# Patient Record
Sex: FEMALE | Race: WHITE
[De-identification: names, ages, dates, MRNs, and addresses within clinical notes are randomized per-mention and may not be internally consistent; named-entity substitution may affect disease eponyms.]

---

## 2017-04-24 ENCOUNTER — HOSPITAL ENCOUNTER (OUTPATIENT)
Dept: HOSPITAL 8 - PETCFH | Age: 57
Discharge: HOME | End: 2017-04-24
Attending: RADIOLOGY
Payer: COMMERCIAL

## 2017-04-24 DIAGNOSIS — C25.1: Primary | ICD-10-CM

## 2017-04-24 PROCEDURE — 78815 PET IMAGE W/CT SKULL-THIGH: CPT

## 2017-04-24 PROCEDURE — A9552 F18 FDG: HCPCS

## 2017-05-02 ENCOUNTER — HOSPITAL ENCOUNTER (OUTPATIENT)
Dept: HOSPITAL 8 - OUT | Age: 57
Discharge: HOME | End: 2017-05-02
Attending: SURGERY
Payer: COMMERCIAL

## 2017-05-02 VITALS — HEIGHT: 65 IN | BODY MASS INDEX: 23.32 KG/M2 | WEIGHT: 139.99 LBS

## 2017-05-02 VITALS — DIASTOLIC BLOOD PRESSURE: 76 MMHG | SYSTOLIC BLOOD PRESSURE: 113 MMHG

## 2017-05-02 DIAGNOSIS — F32.9: ICD-10-CM

## 2017-05-02 DIAGNOSIS — Z84.1: ICD-10-CM

## 2017-05-02 DIAGNOSIS — M19.071: ICD-10-CM

## 2017-05-02 DIAGNOSIS — F41.9: ICD-10-CM

## 2017-05-02 DIAGNOSIS — C25.1: Primary | ICD-10-CM

## 2017-05-02 DIAGNOSIS — Z79.01: ICD-10-CM

## 2017-05-02 PROCEDURE — 85730 THROMBOPLASTIN TIME PARTIAL: CPT

## 2017-05-02 PROCEDURE — 77001 FLUOROGUIDE FOR VEIN DEVICE: CPT

## 2017-05-02 PROCEDURE — 36571 INSERT PICVAD CATH: CPT

## 2017-05-02 PROCEDURE — C1788 PORT, INDWELLING, IMP: HCPCS

## 2017-05-02 PROCEDURE — 85610 PROTHROMBIN TIME: CPT

## 2017-05-02 PROCEDURE — 36415 COLL VENOUS BLD VENIPUNCTURE: CPT

## 2017-05-02 PROCEDURE — 71010: CPT

## 2017-05-24 ENCOUNTER — HOSPITAL ENCOUNTER (EMERGENCY)
Dept: HOSPITAL 8 - ED | Age: 57
Discharge: HOME | End: 2017-05-24
Payer: COMMERCIAL

## 2017-05-24 VITALS — BODY MASS INDEX: 25.37 KG/M2 | HEIGHT: 64 IN | WEIGHT: 148.59 LBS

## 2017-05-24 VITALS — SYSTOLIC BLOOD PRESSURE: 122 MMHG | DIASTOLIC BLOOD PRESSURE: 76 MMHG

## 2017-05-24 DIAGNOSIS — R42: Primary | ICD-10-CM

## 2017-05-24 LAB
AST SERPL-CCNC: 22 U/L (ref 15–37)
BUN SERPL-MCNC: 9 MG/DL (ref 7–18)

## 2017-05-24 PROCEDURE — 36415 COLL VENOUS BLD VENIPUNCTURE: CPT

## 2017-05-24 PROCEDURE — 80053 COMPREHEN METABOLIC PANEL: CPT

## 2017-05-24 PROCEDURE — 93005 ELECTROCARDIOGRAM TRACING: CPT

## 2017-05-24 PROCEDURE — 85025 COMPLETE CBC W/AUTO DIFF WBC: CPT

## 2017-05-24 PROCEDURE — 96360 HYDRATION IV INFUSION INIT: CPT

## 2017-05-24 PROCEDURE — 99285 EMERGENCY DEPT VISIT HI MDM: CPT

## 2017-05-24 PROCEDURE — 83690 ASSAY OF LIPASE: CPT

## 2017-05-24 PROCEDURE — 83605 ASSAY OF LACTIC ACID: CPT

## 2017-05-24 PROCEDURE — 82140 ASSAY OF AMMONIA: CPT

## 2017-05-24 PROCEDURE — 70450 CT HEAD/BRAIN W/O DYE: CPT

## 2017-08-08 DIAGNOSIS — Z01.812 PRE-PROCEDURAL LABORATORY EXAMINATION: ICD-10-CM

## 2017-08-08 LAB
ABO GROUP BLD: NORMAL
ALBUMIN SERPL BCP-MCNC: 4.4 G/DL (ref 3.2–4.9)
ALBUMIN/GLOB SERPL: 1.8 G/DL
ALP SERPL-CCNC: 51 U/L (ref 30–99)
ALT SERPL-CCNC: 20 U/L (ref 2–50)
ANION GAP SERPL CALC-SCNC: 7 MMOL/L (ref 0–11.9)
APTT PPP: 26.8 SEC (ref 24.7–36)
AST SERPL-CCNC: 30 U/L (ref 12–45)
BASOPHILS # BLD AUTO: 0.8 % (ref 0–1.8)
BASOPHILS # BLD: 0.04 K/UL (ref 0–0.12)
BILIRUB SERPL-MCNC: 0.3 MG/DL (ref 0.1–1.5)
BLD GP AB SCN SERPL QL: NORMAL
BUN SERPL-MCNC: 9 MG/DL (ref 8–22)
CALCIUM SERPL-MCNC: 10 MG/DL (ref 8.5–10.5)
CHLORIDE SERPL-SCNC: 103 MMOL/L (ref 96–112)
CO2 SERPL-SCNC: 29 MMOL/L (ref 20–33)
CREAT SERPL-MCNC: 0.86 MG/DL (ref 0.5–1.4)
EOSINOPHIL # BLD AUTO: 0.07 K/UL (ref 0–0.51)
EOSINOPHIL NFR BLD: 1.3 % (ref 0–6.9)
ERYTHROCYTE [DISTWIDTH] IN BLOOD BY AUTOMATED COUNT: 49 FL (ref 35.9–50)
GFR SERPL CREATININE-BSD FRML MDRD: >60 ML/MIN/1.73 M 2
GLOBULIN SER CALC-MCNC: 2.4 G/DL (ref 1.9–3.5)
GLUCOSE SERPL-MCNC: 93 MG/DL (ref 65–99)
HCT VFR BLD AUTO: 42.3 % (ref 37–47)
HGB BLD-MCNC: 13.8 G/DL (ref 12–16)
IMM GRANULOCYTES # BLD AUTO: 0.01 K/UL (ref 0–0.11)
IMM GRANULOCYTES NFR BLD AUTO: 0.2 % (ref 0–0.9)
INR PPP: 0.93 (ref 0.87–1.13)
LYMPHOCYTES # BLD AUTO: 2.1 K/UL (ref 1–4.8)
LYMPHOCYTES NFR BLD: 39.8 % (ref 22–41)
MCH RBC QN AUTO: 29.7 PG (ref 27–33)
MCHC RBC AUTO-ENTMCNC: 32.6 G/DL (ref 33.6–35)
MCV RBC AUTO: 91.2 FL (ref 81.4–97.8)
MONOCYTES # BLD AUTO: 0.42 K/UL (ref 0–0.85)
MONOCYTES NFR BLD AUTO: 8 % (ref 0–13.4)
NEUTROPHILS # BLD AUTO: 2.63 K/UL (ref 2–7.15)
NEUTROPHILS NFR BLD: 49.9 % (ref 44–72)
NRBC # BLD AUTO: 0 K/UL
NRBC BLD AUTO-RTO: 0 /100 WBC
PLATELET # BLD AUTO: 155 K/UL (ref 164–446)
PMV BLD AUTO: 11 FL (ref 9–12.9)
POTASSIUM SERPL-SCNC: 4.5 MMOL/L (ref 3.6–5.5)
PROT SERPL-MCNC: 6.8 G/DL (ref 6–8.2)
PROTHROMBIN TIME: 12.7 SEC (ref 12–14.6)
RBC # BLD AUTO: 4.64 M/UL (ref 4.2–5.4)
RH BLD: NORMAL
SODIUM SERPL-SCNC: 139 MMOL/L (ref 135–145)
WBC # BLD AUTO: 5.3 K/UL (ref 4.8–10.8)

## 2017-08-08 PROCEDURE — 85025 COMPLETE CBC W/AUTO DIFF WBC: CPT

## 2017-08-08 PROCEDURE — 86850 RBC ANTIBODY SCREEN: CPT

## 2017-08-08 PROCEDURE — 85730 THROMBOPLASTIN TIME PARTIAL: CPT

## 2017-08-08 PROCEDURE — 86900 BLOOD TYPING SEROLOGIC ABO: CPT

## 2017-08-08 PROCEDURE — 86901 BLOOD TYPING SEROLOGIC RH(D): CPT

## 2017-08-08 PROCEDURE — 36415 COLL VENOUS BLD VENIPUNCTURE: CPT

## 2017-08-08 PROCEDURE — 85610 PROTHROMBIN TIME: CPT

## 2017-08-08 PROCEDURE — 80053 COMPREHEN METABOLIC PANEL: CPT

## 2017-08-08 RX ORDER — OXYCODONE HYDROCHLORIDE 10 MG/1
10 TABLET ORAL EVERY 4 HOURS PRN
COMMUNITY
End: 2017-08-25

## 2017-08-08 RX ORDER — FLUOXETINE HYDROCHLORIDE 40 MG/1
40 CAPSULE ORAL DAILY
COMMUNITY

## 2017-08-08 RX ORDER — LORAZEPAM 1 MG/1
1 TABLET ORAL 2 TIMES DAILY
COMMUNITY

## 2017-08-09 ENCOUNTER — HOSPITAL ENCOUNTER (INPATIENT)
Facility: MEDICAL CENTER | Age: 57
LOS: 6 days | DRG: 406 | End: 2017-08-15
Attending: SURGERY | Admitting: SURGERY
Payer: COMMERCIAL

## 2017-08-09 ENCOUNTER — RESOLUTE PROFESSIONAL BILLING HOSPITAL PROF FEE (OUTPATIENT)
Dept: HOSPITALIST | Facility: MEDICAL CENTER | Age: 57
End: 2017-08-09
Payer: COMMERCIAL

## 2017-08-09 DIAGNOSIS — C25.1 MALIGNANT NEOPLASM OF BODY OF PANCREAS (HCC): ICD-10-CM

## 2017-08-09 DIAGNOSIS — F41.9 ANXIETY: ICD-10-CM

## 2017-08-09 DIAGNOSIS — G89.18 POSTOPERATIVE PAIN: ICD-10-CM

## 2017-08-09 DIAGNOSIS — R74.01 ELEVATED TRANSAMINASE LEVEL: ICD-10-CM

## 2017-08-09 LAB
ABO GROUP BLD: NORMAL
BASOPHILS # BLD AUTO: 0.1 % (ref 0–1.8)
BASOPHILS # BLD: 0.02 K/UL (ref 0–0.12)
EOSINOPHIL # BLD AUTO: 0 K/UL (ref 0–0.51)
EOSINOPHIL NFR BLD: 0 % (ref 0–6.9)
ERYTHROCYTE [DISTWIDTH] IN BLOOD BY AUTOMATED COUNT: 48.5 FL (ref 35.9–50)
HCT VFR BLD AUTO: 36.3 % (ref 37–47)
HGB BLD-MCNC: 12.2 G/DL (ref 12–16)
IMM GRANULOCYTES # BLD AUTO: 0.05 K/UL (ref 0–0.11)
IMM GRANULOCYTES NFR BLD AUTO: 0.4 % (ref 0–0.9)
LYMPHOCYTES # BLD AUTO: 0.6 K/UL (ref 1–4.8)
LYMPHOCYTES NFR BLD: 4.4 % (ref 22–41)
MCH RBC QN AUTO: 30.7 PG (ref 27–33)
MCHC RBC AUTO-ENTMCNC: 33.6 G/DL (ref 33.6–35)
MCV RBC AUTO: 91.4 FL (ref 81.4–97.8)
MONOCYTES # BLD AUTO: 0.9 K/UL (ref 0–0.85)
MONOCYTES NFR BLD AUTO: 6.6 % (ref 0–13.4)
NEUTROPHILS # BLD AUTO: 11.97 K/UL (ref 2–7.15)
NEUTROPHILS NFR BLD: 88.5 % (ref 44–72)
NRBC # BLD AUTO: 0 K/UL
NRBC BLD AUTO-RTO: 0 /100 WBC
PLATELET # BLD AUTO: 131 K/UL (ref 164–446)
PMV BLD AUTO: 10.7 FL (ref 9–12.9)
RBC # BLD AUTO: 3.97 M/UL (ref 4.2–5.4)
WBC # BLD AUTO: 13.5 K/UL (ref 4.8–10.8)

## 2017-08-09 PROCEDURE — 700101 HCHG RX REV CODE 250

## 2017-08-09 PROCEDURE — 501837 HCHG SUTURE CV: Performed by: SURGERY

## 2017-08-09 PROCEDURE — 501498 HCHG SURG-I-LOOP, MAXI (BLUE): Performed by: SURGERY

## 2017-08-09 PROCEDURE — 501445 HCHG STAPLER, SKIN DISP: Performed by: SURGERY

## 2017-08-09 PROCEDURE — 88305 TISSUE EXAM BY PATHOLOGIST: CPT | Mod: 59

## 2017-08-09 PROCEDURE — 160031 HCHG SURGERY MINUTES - 1ST 30 MINS LEVEL 5: Performed by: SURGERY

## 2017-08-09 PROCEDURE — 88307 TISSUE EXAM BY PATHOLOGIST: CPT

## 2017-08-09 PROCEDURE — 160035 HCHG PACU - 1ST 60 MINS PHASE I: Performed by: SURGERY

## 2017-08-09 PROCEDURE — 500378 HCHG DRAIN, J-VAC ROUND 19FR: Performed by: SURGERY

## 2017-08-09 PROCEDURE — 500380 HCHG DRAIN, PENROSE 1/4X12: Performed by: SURGERY

## 2017-08-09 PROCEDURE — 500122 HCHG BOVIE, BLADE: Performed by: SURGERY

## 2017-08-09 PROCEDURE — 700111 HCHG RX REV CODE 636 W/ 250 OVERRIDE (IP): Performed by: ANESTHESIOLOGY

## 2017-08-09 PROCEDURE — 500424 HCHG DRESSING, AIRSTRIP: Performed by: SURGERY

## 2017-08-09 PROCEDURE — 85025 COMPLETE CBC W/AUTO DIFF WBC: CPT

## 2017-08-09 PROCEDURE — 501422 HCHG SPONGE, SURGIFOAM 100: Performed by: SURGERY

## 2017-08-09 PROCEDURE — 500371 HCHG DRAIN, BLAKE 10MM: Performed by: SURGERY

## 2017-08-09 PROCEDURE — 501838 HCHG SUTURE GENERAL: Performed by: SURGERY

## 2017-08-09 PROCEDURE — 770022 HCHG ROOM/CARE - ICU (200)

## 2017-08-09 PROCEDURE — 500697 HCHG HEMOCLIP, LARGE (ORANGE): Performed by: SURGERY

## 2017-08-09 PROCEDURE — 700105 HCHG RX REV CODE 258: Performed by: ANESTHESIOLOGY

## 2017-08-09 PROCEDURE — 500698 HCHG HEMOCLIP, MEDIUM: Performed by: SURGERY

## 2017-08-09 PROCEDURE — 501561 HCHG TRAY, EPIDURAL SINGLE SHOT: Performed by: SURGERY

## 2017-08-09 PROCEDURE — 160042 HCHG SURGERY MINUTES - EA ADDL 1 MIN LEVEL 5: Performed by: SURGERY

## 2017-08-09 PROCEDURE — 160036 HCHG PACU - EA ADDL 30 MINS PHASE I: Performed by: SURGERY

## 2017-08-09 PROCEDURE — 700102 HCHG RX REV CODE 250 W/ 637 OVERRIDE(OP)

## 2017-08-09 PROCEDURE — 88309 TISSUE EXAM BY PATHOLOGIST: CPT

## 2017-08-09 PROCEDURE — 501443 HCHG STAPLER, ROTIC. FLEX: Performed by: SURGERY

## 2017-08-09 PROCEDURE — C1725 CATH, TRANSLUMIN NON-LASER: HCPCS | Performed by: SURGERY

## 2017-08-09 PROCEDURE — A6402 STERILE GAUZE <= 16 SQ IN: HCPCS | Performed by: SURGERY

## 2017-08-09 PROCEDURE — 502704 HCHG DEVICE, LIGASURE IMPACT: Performed by: SURGERY

## 2017-08-09 PROCEDURE — 160022 HCHG BLOCK: Performed by: SURGERY

## 2017-08-09 PROCEDURE — 160002 HCHG RECOVERY MINUTES (STAT): Performed by: SURGERY

## 2017-08-09 PROCEDURE — A9270 NON-COVERED ITEM OR SERVICE: HCPCS | Performed by: SURGERY

## 2017-08-09 PROCEDURE — 88304 TISSUE EXAM BY PATHOLOGIST: CPT

## 2017-08-09 PROCEDURE — 501463 HCHG STAPLES, UNIV. ROTIC: Performed by: SURGERY

## 2017-08-09 PROCEDURE — 160009 HCHG ANES TIME/MIN: Performed by: SURGERY

## 2017-08-09 PROCEDURE — 700102 HCHG RX REV CODE 250 W/ 637 OVERRIDE(OP): Performed by: SURGERY

## 2017-08-09 PROCEDURE — 500700 HCHG HEMOCLIP, SMALL (RED): Performed by: SURGERY

## 2017-08-09 PROCEDURE — 700111 HCHG RX REV CODE 636 W/ 250 OVERRIDE (IP)

## 2017-08-09 PROCEDURE — 501499 HCHG SURG-I-LOOP, MINI (BLUE): Performed by: SURGERY

## 2017-08-09 PROCEDURE — A9270 NON-COVERED ITEM OR SERVICE: HCPCS

## 2017-08-09 PROCEDURE — 160048 HCHG OR STATISTICAL LEVEL 1-5: Performed by: SURGERY

## 2017-08-09 RX ORDER — DIPHENHYDRAMINE HYDROCHLORIDE 50 MG/ML
25 INJECTION INTRAMUSCULAR; INTRAVENOUS EVERY 6 HOURS PRN
Status: DISCONTINUED | OUTPATIENT
Start: 2017-08-09 | End: 2017-08-12

## 2017-08-09 RX ORDER — DIPHENHYDRAMINE HCL 25 MG
12.5 TABLET ORAL EVERY 6 HOURS PRN
Status: DISCONTINUED | OUTPATIENT
Start: 2017-08-09 | End: 2017-08-09

## 2017-08-09 RX ORDER — LORAZEPAM 2 MG/ML
INJECTION INTRAMUSCULAR
Status: COMPLETED
Start: 2017-08-09 | End: 2017-08-09

## 2017-08-09 RX ORDER — SODIUM CHLORIDE, SODIUM LACTATE, POTASSIUM CHLORIDE, CALCIUM CHLORIDE 600; 310; 30; 20 MG/100ML; MG/100ML; MG/100ML; MG/100ML
INJECTION, SOLUTION INTRAVENOUS CONTINUOUS
Status: DISCONTINUED | OUTPATIENT
Start: 2017-08-09 | End: 2017-08-12

## 2017-08-09 RX ORDER — ONDANSETRON 2 MG/ML
4 INJECTION INTRAMUSCULAR; INTRAVENOUS EVERY 4 HOURS PRN
Status: DISCONTINUED | OUTPATIENT
Start: 2017-08-09 | End: 2017-08-15 | Stop reason: HOSPADM

## 2017-08-09 RX ORDER — LIDOCAINE HYDROCHLORIDE 10 MG/ML
0.5 INJECTION, SOLUTION INFILTRATION; PERINEURAL
Status: COMPLETED | OUTPATIENT
Start: 2017-08-09 | End: 2017-08-09

## 2017-08-09 RX ORDER — ALPRAZOLAM 0.25 MG/1
0.25 TABLET ORAL 2 TIMES DAILY PRN
Status: DISCONTINUED | OUTPATIENT
Start: 2017-08-09 | End: 2017-08-10

## 2017-08-09 RX ORDER — FLUOXETINE HYDROCHLORIDE 20 MG/1
40 CAPSULE ORAL DAILY
Status: DISCONTINUED | OUTPATIENT
Start: 2017-08-09 | End: 2017-08-15 | Stop reason: HOSPADM

## 2017-08-09 RX ORDER — LIDOCAINE AND PRILOCAINE 25; 25 MG/G; MG/G
1 CREAM TOPICAL
Status: COMPLETED | OUTPATIENT
Start: 2017-08-09 | End: 2017-08-09

## 2017-08-09 RX ORDER — SCOLOPAMINE TRANSDERMAL SYSTEM 1 MG/1
1 PATCH, EXTENDED RELEASE TRANSDERMAL
Status: DISCONTINUED | OUTPATIENT
Start: 2017-08-09 | End: 2017-08-10

## 2017-08-09 RX ORDER — NALOXONE HYDROCHLORIDE 0.4 MG/ML
0.1 INJECTION, SOLUTION INTRAMUSCULAR; INTRAVENOUS; SUBCUTANEOUS
Status: DISCONTINUED | OUTPATIENT
Start: 2017-08-09 | End: 2017-08-11

## 2017-08-09 RX ORDER — SODIUM CHLORIDE, SODIUM LACTATE, POTASSIUM CHLORIDE, CALCIUM CHLORIDE 600; 310; 30; 20 MG/100ML; MG/100ML; MG/100ML; MG/100ML
250 INJECTION, SOLUTION INTRAVENOUS PRN
Status: DISCONTINUED | OUTPATIENT
Start: 2017-08-09 | End: 2017-08-11

## 2017-08-09 RX ORDER — SCOLOPAMINE TRANSDERMAL SYSTEM 1 MG/1
1 PATCH, EXTENDED RELEASE TRANSDERMAL
Status: DISCONTINUED | OUTPATIENT
Start: 2017-08-09 | End: 2017-08-12

## 2017-08-09 RX ORDER — DEXAMETHASONE SODIUM PHOSPHATE 4 MG/ML
4 INJECTION, SOLUTION INTRA-ARTICULAR; INTRALESIONAL; INTRAMUSCULAR; INTRAVENOUS; SOFT TISSUE
Status: COMPLETED | OUTPATIENT
Start: 2017-08-09 | End: 2017-08-11

## 2017-08-09 RX ORDER — HALOPERIDOL 5 MG/ML
1 INJECTION INTRAMUSCULAR EVERY 6 HOURS PRN
Status: DISCONTINUED | OUTPATIENT
Start: 2017-08-09 | End: 2017-08-12

## 2017-08-09 RX ORDER — SODIUM CHLORIDE, SODIUM LACTATE, POTASSIUM CHLORIDE, CALCIUM CHLORIDE 600; 310; 30; 20 MG/100ML; MG/100ML; MG/100ML; MG/100ML
INJECTION, SOLUTION INTRAVENOUS CONTINUOUS
Status: DISCONTINUED | OUTPATIENT
Start: 2017-08-09 | End: 2017-08-09

## 2017-08-09 RX ORDER — MAGNESIUM HYDROXIDE 1200 MG/15ML
LIQUID ORAL
Status: DISCONTINUED | OUTPATIENT
Start: 2017-08-09 | End: 2017-08-09 | Stop reason: HOSPADM

## 2017-08-09 RX ORDER — HYDROMORPHONE HYDROCHLORIDE 2 MG/ML
INJECTION, SOLUTION INTRAMUSCULAR; INTRAVENOUS; SUBCUTANEOUS
Status: COMPLETED
Start: 2017-08-09 | End: 2017-08-09

## 2017-08-09 RX ORDER — ONDANSETRON 2 MG/ML
INJECTION INTRAMUSCULAR; INTRAVENOUS
Status: COMPLETED
Start: 2017-08-09 | End: 2017-08-09

## 2017-08-09 RX ORDER — SCOLOPAMINE TRANSDERMAL SYSTEM 1 MG/1
PATCH, EXTENDED RELEASE TRANSDERMAL
Status: COMPLETED
Start: 2017-08-09 | End: 2017-08-12

## 2017-08-09 RX ORDER — LIDOCAINE HYDROCHLORIDE 10 MG/ML
INJECTION, SOLUTION INFILTRATION; PERINEURAL
Status: COMPLETED
Start: 2017-08-09 | End: 2017-08-09

## 2017-08-09 RX ORDER — DIPHENHYDRAMINE HYDROCHLORIDE 50 MG/ML
12.5 INJECTION INTRAMUSCULAR; INTRAVENOUS EVERY 6 HOURS PRN
Status: DISCONTINUED | OUTPATIENT
Start: 2017-08-09 | End: 2017-08-09

## 2017-08-09 RX ADMIN — SODIUM CHLORIDE, SODIUM LACTATE, POTASSIUM CHLORIDE, CALCIUM CHLORIDE: 600; 310; 30; 20 INJECTION, SOLUTION INTRAVENOUS at 10:05

## 2017-08-09 RX ADMIN — HYDROMORPHONE HYDROCHLORIDE 0.5 MG: 1 INJECTION, SOLUTION INTRAMUSCULAR; INTRAVENOUS; SUBCUTANEOUS at 23:52

## 2017-08-09 RX ADMIN — HYDROMORPHONE HYDROCHLORIDE 0.5 MG: 2 INJECTION INTRAMUSCULAR; INTRAVENOUS; SUBCUTANEOUS at 14:55

## 2017-08-09 RX ADMIN — SCOLOPAMINE TRANSDERMAL SYSTEM 1 PATCH: 1 PATCH, EXTENDED RELEASE TRANSDERMAL at 11:20

## 2017-08-09 RX ADMIN — ALPRAZOLAM 0.25 MG: 0.25 TABLET ORAL at 19:43

## 2017-08-09 RX ADMIN — LORAZEPAM 1 MG: 2 INJECTION INTRAMUSCULAR; INTRAVENOUS at 14:50

## 2017-08-09 RX ADMIN — ONDANSETRON 4 MG: 2 INJECTION INTRAMUSCULAR; INTRAVENOUS at 10:10

## 2017-08-09 RX ADMIN — ROPIVACAINE HYDROCHLORIDE: 10 INJECTION, SOLUTION EPIDURAL at 15:07

## 2017-08-09 RX ADMIN — FLUOXETINE HYDROCHLORIDE 40 MG: 20 CAPSULE ORAL at 19:43

## 2017-08-09 RX ADMIN — LIDOCAINE HYDROCHLORIDE 0.5 ML: 10 INJECTION, SOLUTION INFILTRATION; PERINEURAL at 10:05

## 2017-08-09 RX ADMIN — HYDROMORPHONE HYDROCHLORIDE 0.5 MG: 2 INJECTION INTRAMUSCULAR; INTRAVENOUS; SUBCUTANEOUS at 15:05

## 2017-08-09 RX ADMIN — SCOPALAMINE 1 PATCH: 1 PATCH, EXTENDED RELEASE TRANSDERMAL at 11:20

## 2017-08-09 RX ADMIN — LORAZEPAM 1 MG: 2 INJECTION INTRAMUSCULAR; INTRAVENOUS at 14:42

## 2017-08-09 RX ADMIN — HYDROMORPHONE HYDROCHLORIDE 0.5 MG: 1 INJECTION, SOLUTION INTRAMUSCULAR; INTRAVENOUS; SUBCUTANEOUS at 22:26

## 2017-08-09 ASSESSMENT — PAIN SCALES - GENERAL
PAINLEVEL_OUTOF10: 7
PAINLEVEL_OUTOF10: 0
PAINLEVEL_OUTOF10: 8
PAINLEVEL_OUTOF10: 10
PAINLEVEL_OUTOF10: 0
PAINLEVEL_OUTOF10: 2
PAINLEVEL_OUTOF10: 0
PAINLEVEL_OUTOF10: 4
PAINLEVEL_OUTOF10: 0
PAINLEVEL_OUTOF10: 0
PAINLEVEL_OUTOF10: 10
PAINLEVEL_OUTOF10: 0
PAINLEVEL_OUTOF10: 5
PAINLEVEL_OUTOF10: 8
PAINLEVEL_OUTOF10: 4
PAINLEVEL_OUTOF10: 0

## 2017-08-09 ASSESSMENT — LIFESTYLE VARIABLES
EVER_SMOKED: YES
ALCOHOL_USE: NO

## 2017-08-09 NOTE — IP AVS SNAPSHOT
" Home Care Instructions                                                                                                                  Name:Frieda Huizar  Medical Record Number:[id number]  CSN: [id number]    YOB: 1960   Age: 57 y.o.  Sex: female  HT:1.651 m (5' 5\") WT: 52.3 kg (115 lb 4.8 oz)          Admit Date: 8/9/2017     Discharge Date:   Today's Date: 8/15/2017  Attending Doctor:  Gera Lancaster,*                  Allergies:  Review of patient's allergies indicates no known allergies.            Discharge Instructions       Discharge Instructions    Discharged to home by car with relative. Discharged via wheelchair, hospital escort: Yes.  Special equipment needed: Not Applicable    Be sure to schedule a follow-up appointment with your primary care doctor or any specialists as instructed.     Discharge Plan:   Diet Plan: Discussed  Activity Level: Discussed  Confirmed Follow up Appointment: Patient to Call and Schedule Appointment  Confirmed Symptoms Management: Discussed  Medication Reconciliation Updated: Yes  Pneumococcal Vaccine Given - only chart on this line when given: Given (See MAR)  Influenza Vaccine Indication: Not indicated: Previously immunized this influenza season and > 8 years of age    I understand that a diet low in cholesterol, fat, and sodium is recommended for good health. Unless I have been given specific instructions below for another diet, I accept this instruction as my diet prescription.   Other diet: As Tolerated    Special Instructions:   OK to shower  Follow up with office MA on 8/21/17 for staple removal  Follow up with Dr. Lancaster in 2 weeks (8/29/17)    · Is patient discharged on Warfarin / Coumadin?   No     · Is patient Post Blood Transfusion?  No    Depression / Suicide Risk    As you are discharged from this [facility] facility, it is important to learn how to keep safe from harming yourself.    Recognize the warning signs:  · Abrupt changes in " personality, positive or negative- including increase in energy   · Giving away possessions  · Change in eating patterns- significant weight changes-  positive or negative  · Change in sleeping patterns- unable to sleep or sleeping all the time   · Unwillingness or inability to communicate  · Depression  · Unusual sadness, discouragement and loneliness  · Talk of wanting to die  · Neglect of personal appearance   · Rebelliousness- reckless behavior  · Withdrawal from people/activities they love  · Confusion- inability to concentrate     If you or a loved one observes any of these behaviors or has concerns about self-harm, here's what you can do:  · Talk about it- your feelings and reasons for harming yourself  · Remove any means that you might use to hurt yourself (examples: pills, rope, extension cords, firearm)  · Get professional help from the community (Mental Health, Substance Abuse, psychological counseling)  · Do not be alone:Call your Safe Contact- someone whom you trust who will be there for you.  · Call your local CRISIS HOTLINE 897-1248 or 843-003-7612  · Call your local Children's Mobile Crisis Response Team [place] (653) 560-1340 or wwwThotz  · Call the toll free National Suicide Prevention Hotlines   · National Suicide Prevention Lifeline 804-385-JBTF (8023)  · National Hope Line Network 800-SUICIDE (693-7017)    Pancreatic Cancer   Pancreatic cancer is an abnormal growth of tissue (tumor) in the pancreas that is cancerous (malignant). Unlike noncancerous (benign) tumors, malignant tumors can spread to other parts of your body. The pancreas is a gland located deep in the abdomen, between the stomach and the spine. The pancreas makes insulin and other hormones. These hormones help the body use or store the energy that comes from food. The pancreas also makes pancreatic juices. These juices contain enzymes that help digest food.  Most pancreatic cancers begin in the ducts that carry  "pancreatic juices. When cancer of the pancreas spreads (metastasizes) outside the pancreas, cancer cells are often found in nearby lymph nodes. If the cancer has reached these nodes, it means that cancer cells may have spread to other lymph nodes or other tissues, such as the liver or lungs. Sometimes cancer of the pancreas spreads to the peritoneum. This is the tissue that lines the abdomen.  CAUSES   The exact cause of pancreatic cancer is unknown.   RISK FACTORS  There are a number of risk factors that can increase your chances of getting pancreatic cancer. They include:  · Age. The likelihood of developing pancreatic cancer increases with age. Most pancreatic cancers occur in people older than 60 years.  · Smoking. Cigarette smokers are 2 to 3 times more likely than nonsmokers to develop pancreatic cancer.  · Diabetes, especially if you were diagnosed as an adult.  · Being male.  · Being .  · Family history. The risk for developing pancreatic cancer triples if a person's mother, father, sister, or brother had the disease. Also, a family history of colon cancer or ovarian cancer increases the risk of pancreatic cancer.  · Chronic pancreatitis. Chronic pancreatitis is a painful condition of the pancreas.  · Exposure to certain chemicals in the workplace.  · Being obese or eating a diet high in fat and red meat.  SYMPTOMS   Pancreatic cancer is sometimes called a \"silent disease.\" This is because early pancreatic cancer often does not cause symptoms. As the cancer grows, symptoms may include:  · Weakness.  · Abdominal pain.  · Diarrhea.  · Depression.  · Loss of appetite.  · Indigestion.  · Pain in the upper abdomen or upper back.  · Nausea and vomiting.  · Yellowing of the skin or eyes (jaundice).  · Back pain.  · Weight loss.  · Fatigue.  · Black-colored stools.  · Unexplained blood clots.  · Dark urine.  DIAGNOSIS   Your caregiver will ask about your medical history. He or she may also perform a " number of procedures, such as:  · A physical exam. Your skin and eyes will be examined for signs of jaundice. The abdomen will be checked for changes in the area near the pancreas, liver, and gallbladder. Your caregiver will also check for an abnormal buildup of fluid in the abdomen (ascites).  · Lab tests. Your caregiver may take blood and urine samples to check for bilirubin and other substances. Bilirubin is a substance that passes from the liver to the intestine through the gallbladder and bile duct. If the bile duct is blocked by a tumor, the bilirubin cannot pass through normally. Blockage of the bile duct may cause the level of bilirubin in the blood and urine to become very high.  · Computed tomography (CT). An X-ray machine linked to a computer takes a series of detailed pictures of the pancreas and other organs and blood vessels in the abdomen.  · Ultrasonography. The ultrasound device uses sound waves to produce pictures of the pancreas and other organs inside the abdomen.  · Endoscopic retrograde cholangiopancreatography. A lighted tube (endoscope) is passed through the patient's mouth and stomach, down into the small intestine. Then a smaller tube (catheter) is inserted through the endoscope into the bile ducts and pancreatic ducts. A dye is injected through the catheter into the ducts and X-rays are taken. The X-rays can show whether the ducts are narrowed or blocked by a tumor.  · Endoscopic ultrasonography. An endoscope is passed through the patient's mouth and stomach, down into the small intestine. An ultrasound device is placed down the endoscope to produce pictures of the area, including the pancreas.  · Percutaneous transhepatic cholangiography. A dye is injected through a thin needle into the liver and X-rays are taken. Unless there is a blockage, the dye should move freely through the bile ducts. From the X-rays, your caregiver can tell whether there is a blockage from a tumor.  · Taking a  tissue sample (biopsy) from the pancreas. The sample is examined under a microscope to look for cancer cells.  Your cancer will be staged according to its severity and extent. Staging is a careful attempt to categorize your cancer to help determine which treatment will be most appropriate. Factors involved in staging include the size of the tumor, whether the cancer has spread, and if so, to what parts of the body it has spread. You may need to have more tests to determine the stage of your cancer. The test results will help determine what treatment plan is best for you.  STAGES   · Stage I. The cancer is only found in the pancreas.  · Stage II. The cancer has spread to nearby tissues and possibly to the lymph nodes, but not to the blood vessels.  · Stage III. The cancer is surrounding the major blood vessels beside the pancreas and has possibly spread to the lymph nodes.  · Stage IV. The cancer has spread to other parts of the body, such as the liver, lungs, or peritoneum.  TREATMENT   Treatment generally begins within several weeks after the diagnosis. There will be time to talk with your caregiver about treatment choices, get a second opinion, and learn more about the disease. Your caregiver may refer you to a cancer specialist (oncologist).  · Cancer of the pancreas is very hard to control with current treatments. For that reason, many caregivers encourage patients with this disease to consider taking part in a clinical trial. Clinical trials are an important option for people with all stages of pancreatic cancer.  · At this time, pancreatic cancer can be cured only when it is found at an early stage, before it has spread. However, other treatments may be able to control the disease and help patients live longer and feel better. When a cure or control of the disease is not possible, some patients choose palliative therapy. Palliative therapy aims to improve quality of life by controlling pain and other problems  caused by this disease, but it does not cure the disease.  Depending on the type and stage, pancreatic cancer may be treated with surgery, radiation therapy, or chemotherapy. Some patients have a combination of these therapies.  · Surgery may be done to remove all or part of the pancreas. Sometimes the cancer cannot be completely removed. However, if the tumor is blocking the common bile duct or duodenum, the surgeon can place a mesh tube (stent) in the blocked area. This helps keep the duct or duodenum open.  · Radiation therapy uses high-energy rays to kill cancer cells.  · Chemotherapy is the use of drugs to kill cancer cells. Caregivers also give chemotherapy to help reduce pain and other problems caused by pancreatic cancer.  HOME CARE INSTRUCTIONS   · Only take over-the-counter or prescription medicines for pain, discomfort, or fever as directed by your caregiver.  · Maintain a healthy diet.  · Consider joining a support group. This may help you learn to cope with the stress of having pancreatic cancer.  · Seek advice to help you manage treatment side effects.  · Keep all follow-up appointments as directed by your caregiver.  SEEK IMMEDIATE MEDICAL CARE IF:   · You have a sudden increase in pain.  · Your skin or eyes turn more yellow.  · You lose weight without trying.  · You have a fever, especially during chemotherapy treatment or after stent placement.  · You notice new fatigue or weakness.     This information is not intended to replace advice given to you by your health care provider. Make sure you discuss any questions you have with your health care provider.     Whipple Procedure, Care After  Refer to this sheet in the next few weeks. These instructions provide you with information on caring for yourself after your procedure. Your caregiver may also give you more specific instructions. Your treatment has been planned according to current medical practices, but problems sometimes occur. Call your  caregiver if you have any problems or questions after your procedure.  HOME CARE INSTRUCTIONS  Medicines  · Take pain medicine as prescribed by your caregiver. Do not take any over-the-counter pain medicines unless your caregiver says it is okay. Some pain medicines can cause bleeding for several weeks after surgery.  · Constipation is common after this procedure. You may need to take medicine to prevent this.  · Some people have trouble digesting food after a Whipple procedure. Your caregiver may give you medicine to help with digestion.  Wound care  · You may have drainage tubes still in place when you go home. These tubes need to stay in place until no more fluid is draining from your body. Your caregiver will explain what you need to do. Follow the instructions carefully. Note the daily amount of drainage and color of the fluid in the drain. Be sure to ask when you should return to have the tubes taken out.  · You may need to go back to have your stitches (sutures) or staples taken out. Make sure you know when to do that.  · Carefully check your surgical cut (incision) area every day. Make sure there are no signs of infection, such as:  ¨ Pain.  ¨ Swelling.  ¨ Redness.  ¨ Warmth.  ¨ An opening of the incision.  ¨ Bleeding or leaking fluid.  · Keep the incision area dry. Do not use lotions or creams unless your caregiver tells you to.  Diet  · You may not feel like eating for a while. This is normal and may last for a few weeks. When you are able to eat, try to eat:  · Fruits and vegetables.  · Foods with lean protein. Examples are boneless and skinless chicken breasts, lean beef, egg whites, and seafood like tuna and shrimp.  · Low-fat dairy products.  · Foods that are high in fiber. Examples are whole grains, beans, most fruits, and nuts.  · Do not eat foods that contain a lot of fat. They can be hard to digest.  · Drink enough fluids to keep your urine clear or pale yellow. This helps prevent  constipation.  · Try eating small portions more often than 3 times a day. Do not skip meals.  · Weigh yourself once a week. Wear the same amount of clothes each time you weigh yourself.  Activities  · Do not lift anything heavy for 3 to 6 weeks after your surgery. Do not lift anything heavier than 10 pounds (4.5 kg) until your caregiver says it is okay.  · Try to walk 100 yards (90 meters) every day. Do not push yourself too hard. After a few weeks, start to slowly increase how far you walk.  · You can take showers after your bandages are off. Do not take tub baths or swim until your caregiver says it is okay.  · Do not drive if you are taking narcotic pain medicines.  · Ask your caregiver whether it is okay for you do to certain activities, such as going back to work, driving a car, or having sex. It may be a few months before you can go back to all your normal activities.  Follow up  · Keep all your appointments. This is how your caregiver can tell if you are getting better.  · Call your caregiver with any questions.  SEEK MEDICAL CARE IF:  · Your appetite does not get better.  · You have nausea that will not go away.  · You have constipation.  · Your pain does not go away, even after taking pain medicine.  · You become very thirsty, overly tired, dizzy, or you need to urinate often.  SEEK IMMEDIATE MEDICAL CARE IF:  · You cannot eat.  · You are vomiting.  · You have very bad pain that is getting worse.  · You are very tired.  · You have very bad constipation or diarrhea.  · Your skin around the incision or drainage tubes becomes swollen, red, or leaks blood or other fluid.  · Your incision or drainage area hurts.  · You notice a bad smell or a change in the color of fluid in the drainage tube.  · Your incision starts to open.  · You have a fever.  · You have chest pain or difficulty breathing.  MAKE SURE YOU:  · Understand these instructions.  · Will watch your condition.  · Will get help right away if you are  not doing well or get worse.     This information is not intended to replace advice given to you by your health care provider. Make sure you discuss any questions you have with your health care provider.     Open Cholecystectomy, Care After  Refer to this sheet in the next few weeks. These instructions provide you with information on caring for yourself after your procedure. Your health care provider may also give you more specific instructions. Your treatment has been planned according to current medical practices, but problems sometimes occur. Call your health care provider if you have any problems or questions after your procedure.  WHAT TO EXPECT AFTER THE PROCEDURE  After your procedure, it is typical to have the following:  · Pain at your incision site. You will be given medicines to control this pain.  · Constipation. You may be given stool softeners to help prevent this.  HOME CARE INSTRUCTIONS   · Change bandages (dressings) as directed by your health care provider.  · Keep the wound dry and clean. You may wash the wound gently with soap and water. Gently blot or dab the wound dry.  · It is okay to take showers 48 hours after surgery. Do not take baths or use swimming pools or hot tubs for 2 weeks or until your health care provider approves.  · Only take over-the-counter or prescription medicines as directed by your health care provider.  · Continue your normal diet as directed by your health care provider. Eat plenty of fruits and vegetables to help prevent constipation.  · Drink enough fluids to keep your urine clear or pale yellow. This also helps prevent constipation.  · Do not lift anything heavier than 10 pounds (4.5 kg) for 1 month or until your health care provider approves.  · Do not play contact sports for 1 month or until your health care provider approves.  · Do not return to work or school for 4 weeks or until your health care provider approves.  SEEK MEDICAL CARE IF:   · You have redness,  swelling, or increasing pain in the wound.  · You see a yellowish-white fluid (pus) coming from the wound.  · You have drainage from the wound that lasts longer than 1 day.  · You notice a bad smell coming from the wound or dressing.  · Your wound breaks open after the stitches (sutures) or staples have been removed.  · You have increasing pain in the shoulders (shoulder strap areas).  · You have dizzy episodes or faint while standing.  · You feel sick to your stomach (nauseous) or throw up (vomit).  SEEK IMMEDIATE MEDICAL CARE IF:   · You have a fever.  · You develop a rash.  · You have difficulty breathing.  · You have chest pain.  · You have severe abdominal pain.  · You have leg pain.     This information is not intended to replace advice given to you by your health care provider. Make sure you discuss any questions you have with your health care provider.     Oxygen Use at Home  Oxygen can be prescribed for home use. The prescription will show the flow rate. This is how much oxygen is to be used per minute. This will be listed in liters per minute (LPM or L/M). A liter is a metric measurement of volume.  You will use oxygen therapy as directed. It can be used while exercising, sleeping, or at rest. You may need oxygen continuously. Your health care provider may order a blood oxygen test (arterial blood gas or pulse oximetry test) that will show what your oxygen level is. Your health care provider will use these measurements to learn about your needs and follow your progress.  Home oxygen therapy is commonly used on patients with various lung (pulmonary) related conditions. Some of these conditions include:  · Asthma.  · Lung cancer.  · Pneumonia.  · Emphysema.  · Chronic bronchitis.  · Cystic fibrosis.  · Other lung diseases.  · Pulmonary fibrosis.  · Occupational lung disease.  · Heart failure.  · Chronic obstructive pulmonary disease (COPD).  3 COMMON WAYS OF PROVIDING OXYGEN THERAPY  · Gas: The gas form of  oxygen is put into variously sized cylinders or tanks. The cylinders or oxygen tanks contain compressed oxygen. The cylinder is equipped with a regulator that controls the flow rate. Because the flow of oxygen out of the cylinder is constant, an oxygen conserving device may be attached to the system to avoid waste. This device releases the gas only when you inhale and cuts it off when you exhale. Oxygen can be provided in a small cylinder that can be carried with you. Large tanks are heavy and are only for stationary use. After use, empty tanks must be exchanged for full tanks.  · Liquid: The liquid form of oxygen is put into a container similar to a thermos. When released, the liquid converts to a gas and you breathe it in just like the compressed gas. This storage method takes up less space than the compressed gas cylinder, and you can transfer the liquid to a small, portable vessel at home. Liquid oxygen is more expensive than the compressed gas, and the vessel vents when not in use. An oxygen conserving device may be built into the vessel to conserve the oxygen. Liquid oxygen is very cold, around 297° below zero.  · Oxygen concentrator: This medical device filters oxygen from room air and gives almost 100% oxygen to the patient. Oxygen concentrators are powered by electricity. Benefits of this system are:  ¨ It does not need to be resupplied.  ¨ It is not as costly as liquid oxygen.  ¨ Extra tubing permits the user to move around easier.  There are several types of small, portable oxygen systems available which can help you remain active and mobile. You must have a cylinder of oxygen as a backup in the event of a power failure. Advise your electric power company that you are on oxygen therapy in order to get priority service when there is a power failure.  OXYGEN DELIVERY DEVICES  There are 3 common ways to deliver oxygen to your body.  · Nasal cannula. This is a 2-pronged device inserted in the nostrils that is  "connected to tubing carrying the oxygen. The tubing can rest on the ears or be attached to the frame of eyeglasses.  · Mask. People who need a high flow of oxygen generally use a mask.  · Transtracheal catheter. Transtracheal oxygen therapy requires the insertion of a small, flexible tube (catheter) in the windpipe (trachea). This catheter is held in place by a necklace. Since transtracheal oxygen bypasses the mouth, nose, and throat, a humidifier is absolutely required at flow rates of 1 LPM or greater.  OXYGEN USE SAFETY TIPS  · Never smoke while using oxygen. Oxygen does not burn or explode, but flammable materials will burn faster in the presence of oxygen.  · Keep a fire extinguisher close by. Let your fire department know that you have oxygen in your home.  · Warn visitors not to smoke near you when you are using oxygen. Put up \"no smoking\" signs in your home where you most often use the oxygen.  · When you go to a restaurant with your portable oxygen source, ask to be seated in the nonsmoking section.  · Stay at least 5 feet away from gas stoves, candles, lighted fireplaces, or other heat sources.  · Do not use materials that burn easily (flammable) while using your oxygen.  · If you use an oxygen cylinder, make sure it is secured to some fixed object or in a stand. If you use liquid oxygen, make sure the vessel is kept upright to keep the oxygen from pouring out. Liquid oxygen is so cold it can hurt your skin.  · If you use an oxygen concentrator, call your electric company so you will be given priority service if your power goes out. Avoid using extension cords, if possible.  · Regularly test your smoke detectors at home to make sure they work. If you receive care in your home from a nurse or other health care provider, he or she may also check to make sure your smoke detectors work.  GUIDELINES FOR CLEANING YOUR EQUIPMENT  · Wash the nasal prongs with a liquid soap. Thoroughly rinse them once or twice a " week.  · Replace the prongs every 2 to 4 weeks. If you have an infection (cold, pneumonia) change them when you are well.  · Your health care provider will give you instructions on how to clean your transtracheal catheter.  · The humidifier bottle should be washed with soap and warm water and rinsed thoroughly between each refill. Air-dry the bottle before filling it with sterile or distilled water. The bottle and its top should be disinfected after they are cleaned.  · If you use an oxygen concentrator, unplug the unit. Then wipe down the cabinet with a damp cloth and dry it daily. The air filter should be cleaned at least twice a week.  · Follow your home medical equipment and service company's directions for cleaning the compressor filter.  HOME CARE INSTRUCTIONS   · Do not change the flow of oxygen unless directed by your health care provider.  · Do not use alcohol or other sedating drugs unless instructed. They slow your breathing rate.  · Do not use materials that burn easily (flammable) while using your oxygen.  · Always keep a spare tank of oxygen. Plan ahead for holidays when you may not be able to get a prescription filled.  · Use water-based lubricants on your lips or nostrils. Do not use an oil-based product like petroleum jelly.  · To prevent your cheeks or the skin behind your ears from becoming irritated, tuck some gauze under the tubing.  · If you have persistent redness under your nose, call your health care provider.  · When you no longer need oxygen, your doctor will have the oxygen discontinued. Oxygen is not addicting or habit forming.  · Use the oxygen as instructed. Too much oxygen can be harmful and too little will not give you the benefit you need.  · Shortness of breath is not always from a lack of oxygen. If your oxygen level is not the cause of your shortness of breath, taking oxygen will not help.  SEEK MEDICAL CARE IF:   · You have frequent headaches.  · You have shortness of breath or  a lasting cough.  · You have anxiety.  · You are confused.  · You are drowsy or sleepy all the time.  · You develop an illness which aggravates your breathing.  · You cannot exercise.  · You are restless.  · You have blue lips or fingernails.  · You have difficult or irregular breathing and it is getting worse.  · You have a fever.     This information is not intended to replace advice given to you by your health care provider. Make sure you discuss any questions you have with your health care provider.              Follow-up Information     1. Follow up with Alton Layton M.D..    Specialty:  Family Medicine    Why:  As needed    Contact information    [address]  [place] [address]  [phone]          2. Follow up with Gera Lancaster M.D. On 8/29/2017.    Specialties:  Surgery, Radiology    Contact information    [address]  [place] [address]  [phone]           Discharge Medication Instructions:    Below are the medications your physician expects you to take upon discharge:    Review all your home medications and newly ordered medications with your doctor and/or pharmacist. Follow medication instructions as directed by your doctor and/or pharmacist.    Please keep your medication list with you and share with your physician.               Medication List      START taking these medications        Instructions    Morning Afternoon Evening Bedtime    ketorolac 10 MG Tabs   Last time this was given:  10 mg on 8/12/2017  6:30 PM   Commonly known as:  TORADOL   Notes to Patient:  Stagger with Tylenol 650-1,000mg every 6 hours.    Take Toradol 10mg, 3 hours later take Tylenol 650-1,000mg, 3 hours after that repeat the Toradol 10mg.    Continue staggering every 3 hours with the Oxycodone 10mg every 3 hours!        Take 1 Tab by mouth every 6 hours as needed for Mild Pain.   Dose:  10 mg                          CHANGE how you take these medications        Instructions    Morning Afternoon  Evening Bedtime    * oxycodone immediate release 10 MG immediate release tablet   What changed:  Another medication with the same name was added. Make sure you understand how and when to take each.   Last time this was given:  10 mg on 8/15/2017  1:00 PM   Commonly known as:  ROXICODONE        Take 10 mg by mouth every four hours as needed for Moderate Pain.   Dose:  10 mg                        * oxycodone immediate release 10 MG immediate release tablet   What changed:  You were already taking a medication with the same name, and this prescription was added. Make sure you understand how and when to take each.   Last time this was given:  10 mg on 8/15/2017  1:00 PM   Commonly known as:  ROXICODONE        Take 1 Tab by mouth every 3 hours as needed for Severe Pain.   Dose:  10 mg                        * Notice:  This list has 2 medication(s) that are the same as other medications prescribed for you. Read the directions carefully, and ask your doctor or other care provider to review them with you.      CONTINUE taking these medications        Instructions    Morning Afternoon Evening Bedtime    lorazepam 1 MG Tabs   Last time this was given:  1 mg on 8/15/2017  7:36 AM   Commonly known as:  ATIVAN        Take 1 mg by mouth 2 Times a Day.   Dose:  1 mg                        PROZAC 40 MG capsule   Last time this was given:  40 mg on 8/15/2017  7:36 AM   Generic drug:  fluoxetine        Take 40 mg by mouth every day.   Dose:  40 mg                             Where to Get Your Medications      Information about where to get these medications is not yet available     ! Ask your nurse or doctor about these medications    - ketorolac 10 MG Tabs  - oxycodone immediate release 10 MG immediate release tablet            Orders for after discharge     DME O2 New Set Up    Complete by:  As directed        DME O2 New Set Up    Complete by:  As directed              Instructions           Diet / Nutrition:    Follow any diet  instructions given to you by your doctor or the dietician, including how much salt (sodium) you are allowed each day.    If you are overweight, talk to your doctor about a weight reduction plan.    Activity:    Remain physically active following your doctor's instructions about exercise and activity.    Rest often.     Any time you become even a little tired or short of breath, SIT DOWN and rest.    Worsening Symptoms:    Report any of the following signs and symptoms to the doctor's office immediately:    *Pain of jaw, arm, or neck  *Chest pain not relieved by medication                               *Dizziness or loss of consciousness  *Difficulty breathing even when at rest   *More tired than usual                                       *Bleeding drainage or swelling of surgical site  *Swelling of feet, ankles, legs or stomach                 *Fever (>100ºF)  *Pink or blood tinged sputum  *Weight gain (3lbs/day or 5lbs /week)           *Shock from internal defibrillator (if applicable)  *Palpitations or irregular heartbeats                *Cool and/or numb extremities    Stroke Awareness    Common Risk Factors for Stroke include:    Age  Atrial Fibrillation  Carotid Artery Stenosis  Diabetes Mellitus  Excessive alcohol consumption  High blood pressure  Overweight   Physical inactivity  Smoking    Warning signs and symptoms of a stroke include:    *Sudden numbness or weakness of the face, arm or leg (especially on one side of the body).  *Sudden confusion, trouble speaking or understanding.  *Sudden trouble seeing in one or both eyes.  *Sudden trouble walking, dizziness, loss of balance or coordination.Sudden severe headache with no known cause.    It is very important to get treatment quickly when a stroke occurs. If you experience any of the above warning signs, call 911 immediately.                   Disclaimer         Quit Smoking / Tobacco Use:    I understand the use of any tobacco products increases my  chance of suffering from future heart disease or stroke and could cause other illnesses which may shorten my life. Quitting the use of tobacco products is the single most important thing I can do to improve my health. For further information on smoking / tobacco cessation call a Toll Free Quit Line at 1-644.696.9251 (*[employer] [place]) or 1-288.494.6224 (American Lung Association) or you can access the web based program at www.lungusa.org.    [place] Tobacco Users Help Line:  (519) 236-1404       Toll Free: 1-391.555.9480  Quit Tobacco Program [facility] Services (683)685-6496    Crisis Hotline:    [place] Crisis Hotline:  1-258-HLGNSJG or 1-774.915.1333    [place] Crisis Hotline:    1-259.359.2749 or 884-839-5255    Discharge Survey:   Thank you for choosing [facility]. We hope we did everything we could to make your hospital stay a pleasant one. You may be receiving a phone survey and we would appreciate your time and participation in answering the questions. Your input is very valuable to us in our efforts to improve our service to our patients and their families.        My signature on this form indicates that:    1. I have reviewed and understand the above information.  2. My questions regarding this information have been answered to my satisfaction.  3. I have formulated a plan with my discharge nurse to obtain my prescribed medications for home.                  Disclaimer         __________________________________                     __________       ________                       Patient Signature                                                 Date                    Time

## 2017-08-09 NOTE — IP AVS SNAPSHOT
8/15/2017    Frieda Huizar  [address]  [place] [address]    Dear Frieda:    [facility] wants to ensure your discharge home is safe and you or your loved ones have had all of your questions answered regarding your care after you leave the hospital.    Below is a list of resources and contact information should you have any questions regarding your hospital stay, follow-up instructions, or active medical symptoms.    Questions or Concerns Regarding… Contact   Medical Questions Related to Your Discharge  (7 days a week, 8am-5pm) Contact a Nurse Care Coordinator   [phone]   Medical Questions Not Related to Your Discharge  (24 hours a day / 7 days a week)  Contact the Nurse Health Line   [phone]    Medications or Discharge Instructions Refer to your discharge packet   or contact your [facility] Primary Care Provider   [phone]   Follow-up Appointment(s) Schedule your appointment via [employer]   or contact Scheduling [phone]   Billing Review your statement via [employer]  or contact Billing [phone]   Medical Records Review your records via [employer]   or contact Medical Records [phone]     You may receive a telephone call within two days of discharge. This call is to make certain you understand your discharge instructions and have the opportunity to have any questions answered. You can also easily access your medical information, test results and upcoming appointments via the [employer] free online health management tool. You can learn more and sign up at [address]/[employer]. For assistance setting up your [employer] account, please call [phone].    Once again, we want to ensure your discharge home is safe and that you have a clear understanding of any next steps in your care. If you have any questions or concerns, please do not hesitate to contact us, we are here for you. Thank you for choosing [facility] for your healthcare needs.    Sincerely,    Your [facility] Healthcare Team

## 2017-08-09 NOTE — OR NURSING
"Persistent hypotension noted, HR low 100\"s. Skin pink.  No significant changes to dressing and MICHELLE output.  Discussed with Dr. Carrasco - fluid bolus infusing per orders.  Will check CBC.  "

## 2017-08-09 NOTE — OR NURSING
MAP > 60 mmHg s/p crystalloid bolus.  Update given to Dr. Carrasco - no further orders at this time

## 2017-08-09 NOTE — IP AVS SNAPSHOT
" <p align=\"LEFT\"><IMG SRC=\"//[email or web address]/blob$/[email or web address]\" alt=\"Image\" WIDTH=\"50%\" HEIGHT=\"200\" BORDER=\"\"></p>                   Name:Frieda Huizar  Medical Record Number:[id number]  CSN: [id number]    YOB: 1960   Age: 57 y.o.  Sex: female  HT:1.651 m (5' 5\") WT: 52.3 kg (115 lb 4.8 oz)          Admit Date: 8/9/2017     Discharge Date:   Today's Date: 8/15/2017  Attending Doctor:  Gera Lancaster,*                  Allergies:  Review of patient's allergies indicates no known allergies.          Follow-up Information     1. Follow up with Alton Layton M.D..    Specialty:  Family Medicine    Why:  As needed    Contact information    [address]  [place] [id number] [phone]          2. Follow up with Gera Lancaster M.D. On 8/29/2017.    Specialties:  Surgery, Radiology    Contact information    [address]  [place] [address] [phone]           Medication List      Take these Medications        Instructions    ketorolac 10 MG Tabs   Commonly known as:  TORADOL   Notes to Patient:  Stagger with Tylenol 650-1,000mg every 6 hours.    Take Toradol 10mg, 3 hours later take Tylenol 650-1,000mg, 3 hours after that repeat the Toradol 10mg.    Continue staggering every 3 hours with the Oxycodone 10mg every 3 hours!    Take 1 Tab by mouth every 6 hours as needed for Mild Pain.   Dose:  10 mg       lorazepam 1 MG Tabs   Commonly known as:  ATIVAN    Take 1 mg by mouth 2 Times a Day.   Dose:  1 mg       * oxycodone immediate release 10 MG immediate release tablet   What changed:  Another medication with the same name was added. Make sure you understand how and when to take each.   Commonly known as:  ROXICODONE    Take 10 mg by mouth every four hours as needed for Moderate Pain.   Dose:  10 mg       * oxycodone immediate release 10 MG immediate release tablet   What changed:  You were already taking a medication with the same name, and this prescription was added. Make sure you " understand how and when to take each.   Commonly known as:  ROXICODONE    Take 1 Tab by mouth every 3 hours as needed for Severe Pain.   Dose:  10 mg       PROZAC 40 MG capsule   Generic drug:  fluoxetine    Take 40 mg by mouth every day.   Dose:  40 mg       * Notice:  This list has 2 medication(s) that are the same as other medications prescribed for you. Read the directions carefully, and ask your doctor or other care provider to review them with you.

## 2017-08-09 NOTE — OP REPORT
DATE OF SERVICE:  08/09/2017    SURGEON:  Nicolás [place] MD    ASSISTANT:  Gera Lancaster MD    ANESTHESIOLOGIST:  Walter Johnson MD    TYPE OF ANESTHESIA:  General anesthesia.    PREOPERATIVE DIAGNOSIS:  Pancreatic cancer with partial involvement of the   portal vein.    POSTOPERATIVE DIAGNOSIS:  Pancreatic cancer with partial involvement of the   portal vein.    PROCEDURE:  Partial resection of portal vein with direct repair.    INDICATION FOR PROCEDURE:  This is a pleasant lady who is undergoing resection   of pancreatic cancer.  The tumor appeared to be partially involving the   portal vein. The partial portal vein resection is therefore indicated.  The   remaining of the operation will be dictated by Dr. Lancaster.    DESCRIPTION OF PROCEDURE:  The dissection of the pancreatic tumor was   performed down to the portal vein.  A side-biting clamp was used to clamp the   portal vein.  Partial resection of the portal vein was performed along with   the tumor.  The portal vein was then repaired using running 4-0 Prolene   sutures.  There was no significant narrowing of the portal vein following the   Repair.  Excellent hemostasis was seen.    The remaining of the procedure will be dictated separately by Dr. Lancaster.    ESTIMATED BLOOD LOSS:.       ____________________________________     MD QASIM MATAMOROS / ADRIAN    DD:  08/09/2017 14:15:29  DT:  08/09/2017 14:55:08    D#:  [id number]  Job#:  [id number]

## 2017-08-09 NOTE — IP AVS SNAPSHOT
[employer] Access Code: [email or web address]  Expires: 9/14/2017  1:18 PM    Your email address is not on file at [facility].  Email Addresses are required for you to sign up for [employer], please contact [phone] to verify your personal information and to provide your email address prior to attempting to register for [employer].    Frieda Huizar  [address] [place] [address]    [employer]  A secure, online tool to manage your health information     [facility]’s [employer]® is a secure, online tool that connects you to your personalized health information from the privacy of your home -- day or night - making it very easy for you to manage your healthcare. Once the activation process is completed, you can even access your medical information using the [employer] rimma, which is available for free in the Apple Rimma store or Google Play store.     To learn more about [employer], visit [email or web address]    There are two levels of access available (as shown below):   My Chart Features  [facility] Primary Care Doctor [facility]  Specialists [facility]  Urgent  Care Non-[facility] Primary Care Doctor   Email your healthcare team securely and privately 24/7 X X X    Manage appointments: schedule your next appointment; view details of past/upcoming appointments X      Request prescription refills. X      View recent personal medical records, including lab and immunizations X X X X   View health record, including health history, allergies, medications X X X X   Read reports about your outpatient visits, procedures, consult and ER notes X X X X   See your discharge summary, which is a recap of your hospital and/or ER visit that includes your diagnosis, lab results, and care plan X X  X     How to register for [employer]:  Once your e-mail address has been verified, follow the following steps to sign up for [employer].     1. Go to  [email or web address]  2. Click on the Sign Up Now box, which takes you to the New Member Sign Up page. You  will need to provide the following information:  a. Enter your [employer] Access Code exactly as it appears at the top of this page. (You will not need to use this code after you’ve completed the sign-up process. If you do not sign up before the expiration date, you must request a new code.)   b. Enter your date of birth.   c. Enter your home email address.   d. Click Submit, and follow the next screen’s instructions.  3. Create a [employer] ID. This will be your [employer] login ID and cannot be changed, so think of one that is secure and easy to remember.  4. Create a [employer] password. You can change your password at any time.  5. Enter your Password Reset Question and Answer. This can be used at a later time if you forget your password.   6. Enter your e-mail address. This allows you to receive e-mail notifications when new information is available in [employer].  7. Click Sign Up. You can now view your health information.    For assistance activating your [employer] account, call [phone]

## 2017-08-10 PROBLEM — F41.9 ANXIETY: Status: ACTIVE | Noted: 2017-08-10

## 2017-08-10 PROBLEM — R74.01 ELEVATED TRANSAMINASE LEVEL: Status: ACTIVE | Noted: 2017-08-10

## 2017-08-10 PROBLEM — G89.18 POSTOPERATIVE PAIN: Status: ACTIVE | Noted: 2017-08-10

## 2017-08-10 LAB
ALBUMIN SERPL BCP-MCNC: 3.7 G/DL (ref 3.2–4.9)
ALBUMIN/GLOB SERPL: 1.7 G/DL
ALP SERPL-CCNC: 70 U/L (ref 30–99)
ALT SERPL-CCNC: 271 U/L (ref 2–50)
ANION GAP SERPL CALC-SCNC: 8 MMOL/L (ref 0–11.9)
ANISOCYTOSIS BLD QL SMEAR: ABNORMAL
AST SERPL-CCNC: 609 U/L (ref 12–45)
BASOPHILS # BLD AUTO: 0 % (ref 0–1.8)
BASOPHILS # BLD: 0 K/UL (ref 0–0.12)
BILIRUB SERPL-MCNC: 1 MG/DL (ref 0.1–1.5)
BUN SERPL-MCNC: 9 MG/DL (ref 8–22)
CALCIUM SERPL-MCNC: 8.8 MG/DL (ref 8.5–10.5)
CHLORIDE SERPL-SCNC: 101 MMOL/L (ref 96–112)
CO2 SERPL-SCNC: 26 MMOL/L (ref 20–33)
CREAT SERPL-MCNC: 0.55 MG/DL (ref 0.5–1.4)
DACRYOCYTES BLD QL SMEAR: NORMAL
EOSINOPHIL # BLD AUTO: 0 K/UL (ref 0–0.51)
EOSINOPHIL NFR BLD: 0 % (ref 0–6.9)
ERYTHROCYTE [DISTWIDTH] IN BLOOD BY AUTOMATED COUNT: 50.4 FL (ref 35.9–50)
GFR SERPL CREATININE-BSD FRML MDRD: >60 ML/MIN/1.73 M 2
GLOBULIN SER CALC-MCNC: 2.2 G/DL (ref 1.9–3.5)
GLUCOSE SERPL-MCNC: 140 MG/DL (ref 65–99)
HCT VFR BLD AUTO: 38.6 % (ref 37–47)
HGB BLD-MCNC: 12.3 G/DL (ref 12–16)
LYMPHOCYTES # BLD AUTO: 0.55 K/UL (ref 1–4.8)
LYMPHOCYTES NFR BLD: 3.6 % (ref 22–41)
MANUAL DIFF BLD: NORMAL
MCH RBC QN AUTO: 30 PG (ref 27–33)
MCHC RBC AUTO-ENTMCNC: 31.9 G/DL (ref 33.6–35)
MCV RBC AUTO: 94.1 FL (ref 81.4–97.8)
MONOCYTES # BLD AUTO: 1.5 K/UL (ref 0–0.85)
MONOCYTES NFR BLD AUTO: 9.9 % (ref 0–13.4)
MORPHOLOGY BLD-IMP: NORMAL
NEUTROPHILS # BLD AUTO: 13.15 K/UL (ref 2–7.15)
NEUTROPHILS NFR BLD: 82.9 % (ref 44–72)
NEUTS BAND NFR BLD MANUAL: 3.6 % (ref 0–10)
NRBC # BLD AUTO: 0 K/UL
NRBC BLD AUTO-RTO: 0 /100 WBC
OVALOCYTES BLD QL SMEAR: NORMAL
PLATELET # BLD AUTO: 146 K/UL (ref 164–446)
PLATELET BLD QL SMEAR: NORMAL
PMV BLD AUTO: 10.9 FL (ref 9–12.9)
POIKILOCYTOSIS BLD QL SMEAR: NORMAL
POTASSIUM SERPL-SCNC: 4.4 MMOL/L (ref 3.6–5.5)
PROT SERPL-MCNC: 5.9 G/DL (ref 6–8.2)
RBC # BLD AUTO: 4.1 M/UL (ref 4.2–5.4)
RBC BLD AUTO: PRESENT
SMUDGE CELLS BLD QL SMEAR: NORMAL
SODIUM SERPL-SCNC: 135 MMOL/L (ref 135–145)
WBC # BLD AUTO: 15.2 K/UL (ref 4.8–10.8)

## 2017-08-10 PROCEDURE — 700105 HCHG RX REV CODE 258: Performed by: ANESTHESIOLOGY

## 2017-08-10 PROCEDURE — 700111 HCHG RX REV CODE 636 W/ 250 OVERRIDE (IP): Performed by: ANESTHESIOLOGY

## 2017-08-10 PROCEDURE — G8978 MOBILITY CURRENT STATUS: HCPCS | Mod: CK

## 2017-08-10 PROCEDURE — 80053 COMPREHEN METABOLIC PANEL: CPT

## 2017-08-10 PROCEDURE — 0WUF07Z SUPPLEMENT ABDOMINAL WALL WITH AUTOLOGOUS TISSUE SUBSTITUTE, OPEN APPROACH: ICD-10-PCS | Performed by: SURGERY

## 2017-08-10 PROCEDURE — 99233 SBSQ HOSP IP/OBS HIGH 50: CPT | Performed by: SURGERY

## 2017-08-10 PROCEDURE — 770022 HCHG ROOM/CARE - ICU (200)

## 2017-08-10 PROCEDURE — 700111 HCHG RX REV CODE 636 W/ 250 OVERRIDE (IP): Performed by: SURGERY

## 2017-08-10 PROCEDURE — 97166 OT EVAL MOD COMPLEX 45 MIN: CPT

## 2017-08-10 PROCEDURE — 0FBG0ZZ EXCISION OF PANCREAS, OPEN APPROACH: ICD-10-PCS | Performed by: SURGERY

## 2017-08-10 PROCEDURE — 85027 COMPLETE CBC AUTOMATED: CPT

## 2017-08-10 PROCEDURE — A9270 NON-COVERED ITEM OR SERVICE: HCPCS | Performed by: SURGERY

## 2017-08-10 PROCEDURE — 0FT40ZZ RESECTION OF GALLBLADDER, OPEN APPROACH: ICD-10-PCS | Performed by: SURGERY

## 2017-08-10 PROCEDURE — 06B80ZZ EXCISION OF PORTAL VEIN, OPEN APPROACH: ICD-10-PCS | Performed by: INTERNAL MEDICINE

## 2017-08-10 PROCEDURE — 97161 PT EVAL LOW COMPLEX 20 MIN: CPT

## 2017-08-10 PROCEDURE — 85007 BL SMEAR W/DIFF WBC COUNT: CPT

## 2017-08-10 PROCEDURE — 700102 HCHG RX REV CODE 250 W/ 637 OVERRIDE(OP): Performed by: SURGERY

## 2017-08-10 PROCEDURE — G8987 SELF CARE CURRENT STATUS: HCPCS | Mod: CK

## 2017-08-10 PROCEDURE — A9270 NON-COVERED ITEM OR SERVICE: HCPCS | Performed by: ANESTHESIOLOGY

## 2017-08-10 PROCEDURE — 700102 HCHG RX REV CODE 250 W/ 637 OVERRIDE(OP): Performed by: ANESTHESIOLOGY

## 2017-08-10 PROCEDURE — G8979 MOBILITY GOAL STATUS: HCPCS | Mod: CI

## 2017-08-10 PROCEDURE — G8988 SELF CARE GOAL STATUS: HCPCS | Mod: CI

## 2017-08-10 PROCEDURE — 07BP0ZZ EXCISION OF SPLEEN, OPEN APPROACH: ICD-10-PCS | Performed by: SURGERY

## 2017-08-10 PROCEDURE — BF47ZZZ ULTRASONOGRAPHY OF PANCREAS: ICD-10-PCS | Performed by: SURGERY

## 2017-08-10 RX ORDER — MORPHINE SULFATE 15 MG/1
15 TABLET, FILM COATED, EXTENDED RELEASE ORAL EVERY 12 HOURS
Status: DISCONTINUED | OUTPATIENT
Start: 2017-08-10 | End: 2017-08-11

## 2017-08-10 RX ORDER — LORAZEPAM 1 MG/1
1 TABLET ORAL 2 TIMES DAILY
Status: DISCONTINUED | OUTPATIENT
Start: 2017-08-10 | End: 2017-08-15 | Stop reason: HOSPADM

## 2017-08-10 RX ORDER — OXYCODONE HYDROCHLORIDE 10 MG/1
10 TABLET ORAL EVERY 4 HOURS PRN
Status: DISCONTINUED | OUTPATIENT
Start: 2017-08-10 | End: 2017-08-10 | Stop reason: ALTCHOICE

## 2017-08-10 RX ORDER — ALPRAZOLAM 0.5 MG/1
0.5 TABLET ORAL EVERY 4 HOURS PRN
Status: DISCONTINUED | OUTPATIENT
Start: 2017-08-10 | End: 2017-08-10

## 2017-08-10 RX ADMIN — HYDROMORPHONE HYDROCHLORIDE 0.5 MG: 1 INJECTION, SOLUTION INTRAMUSCULAR; INTRAVENOUS; SUBCUTANEOUS at 04:08

## 2017-08-10 RX ADMIN — OXYCODONE HYDROCHLORIDE 10 MG: 10 TABLET ORAL at 06:11

## 2017-08-10 RX ADMIN — FLUOXETINE HYDROCHLORIDE 40 MG: 20 CAPSULE ORAL at 08:11

## 2017-08-10 RX ADMIN — MORPHINE SULFATE 15 MG: 15 TABLET, EXTENDED RELEASE ORAL at 20:04

## 2017-08-10 RX ADMIN — OXYCODONE HYDROCHLORIDE 10 MG: 10 TABLET ORAL at 12:05

## 2017-08-10 RX ADMIN — ENOXAPARIN SODIUM 40 MG: 100 INJECTION SUBCUTANEOUS at 12:05

## 2017-08-10 RX ADMIN — ONDANSETRON 4 MG: 2 INJECTION INTRAMUSCULAR; INTRAVENOUS at 20:04

## 2017-08-10 RX ADMIN — HYDROMORPHONE HYDROCHLORIDE: 2 INJECTION INTRAMUSCULAR; INTRAVENOUS; SUBCUTANEOUS at 21:24

## 2017-08-10 RX ADMIN — HYDROMORPHONE HYDROCHLORIDE 0.5 MG: 1 INJECTION, SOLUTION INTRAMUSCULAR; INTRAVENOUS; SUBCUTANEOUS at 01:06

## 2017-08-10 RX ADMIN — ALPRAZOLAM 0.5 MG: 0.5 TABLET ORAL at 01:06

## 2017-08-10 RX ADMIN — ONDANSETRON 4 MG: 2 INJECTION INTRAMUSCULAR; INTRAVENOUS at 10:13

## 2017-08-10 RX ADMIN — LORAZEPAM 1 MG: 1 TABLET ORAL at 20:04

## 2017-08-10 RX ADMIN — ROPIVACAINE HYDROCHLORIDE: 10 INJECTION, SOLUTION EPIDURAL at 14:03

## 2017-08-10 RX ADMIN — HYDROMORPHONE HYDROCHLORIDE 0.3 MG: 2 INJECTION INTRAMUSCULAR; INTRAVENOUS; SUBCUTANEOUS at 15:10

## 2017-08-10 ASSESSMENT — COGNITIVE AND FUNCTIONAL STATUS - GENERAL
TOILETING: A LOT
EATING MEALS: A LITTLE
PERSONAL GROOMING: A LOT
MOBILITY SCORE: 20
SUGGESTED CMS G CODE MODIFIER MOBILITY: CJ
TURNING FROM BACK TO SIDE WHILE IN FLAT BAD: A LOT
SUGGESTED CMS G CODE MODIFIER DAILY ACTIVITY: CL
HELP NEEDED FOR BATHING: A LOT
DRESSING REGULAR LOWER BODY CLOTHING: A LOT
DAILY ACTIVITIY SCORE: 13
CLIMB 3 TO 5 STEPS WITH RAILING: A LITTLE
MOVING TO AND FROM BED TO CHAIR: A LITTLE
DRESSING REGULAR UPPER BODY CLOTHING: A LOT

## 2017-08-10 ASSESSMENT — PAIN SCALES - GENERAL
PAINLEVEL_OUTOF10: 8
PAINLEVEL_OUTOF10: 10
PAINLEVEL_OUTOF10: 6
PAINLEVEL_OUTOF10: 5
PAINLEVEL_OUTOF10: 4
PAINLEVEL_OUTOF10: 4
PAINLEVEL_OUTOF10: 8
PAINLEVEL_OUTOF10: 5
PAINLEVEL_OUTOF10: 6

## 2017-08-10 ASSESSMENT — ENCOUNTER SYMPTOMS
SHORTNESS OF BREATH: 0
ABDOMINAL PAIN: 1
NEUROLOGICAL NEGATIVE: 1
PALPITATIONS: 0
COUGH: 0
NERVOUS/ANXIOUS: 1
NAUSEA: 1
BACK PAIN: 0
WEAKNESS: 0
MYALGIAS: 1
HEARTBURN: 1
WHEEZING: 0
NECK PAIN: 0

## 2017-08-10 ASSESSMENT — GAIT ASSESSMENTS
GAIT LEVEL OF ASSIST: SUPERVISED
DISTANCE (FEET): 100

## 2017-08-10 ASSESSMENT — ACTIVITIES OF DAILY LIVING (ADL): TOILETING: INDEPENDENT

## 2017-08-10 ASSESSMENT — LIFESTYLE VARIABLES: DO YOU DRINK ALCOHOL: NO

## 2017-08-10 NOTE — CARE PLAN
Problem: Communication  Goal: The ability to communicate needs accurately and effectively will improve  Pt updated on POC, epidural use, and call light for assistance. Pt verbalizes understanding    Problem: Pain Management  Goal: Pain level will decrease to patient’s comfort goal  Assess pts pain level and treat as needed.

## 2017-08-10 NOTE — PROGRESS NOTES
Surgical Progress Note    Author: Gera Lancaster Date & Time created: 8/10/2017   11:57 AM     Interval Events:  POD 1  \difficulty with Pain and anxiety  MICHELLE serous sanguinous  Up in chair     Review of Systems   Gastrointestinal: Positive for abdominal pain.   Psychiatric/Behavioral: The patient is nervous/anxious.    All other systems reviewed and are negative.    Hemodynamics:  Temp (24hrs), Av.6 °C (97.8 °F), Min:36.6 °C (97.8 °F), Max:36.6 °C (97.8 °F)  Temperature: 36.6 °C (97.8 °F), Monitored Temp: 37.7 °C (99.9 °F)  Pulse  Av  Min: 54  Max: 101Heart Rate (Monitored): 83  NIBP: 104/64 mmHg     Respiratory:    Respiration: (!) 35, Pulse Oximetry: 99 %           Neuro:  GCS Total John Coma Score: 15     Fluids:    Intake/Output Summary (Last 24 hours) at 08/10/17 1157  Last data filed at 08/10/17 1000   Gross per 24 hour   Intake   6811 ml   Output   1570 ml   Net   5241 ml     Weight: 51.7 kg (113 lb 15.7 oz)  Current Diet Order   Procedures   • DIET NPO     Physical Exam   Constitutional: She is oriented to person, place, and time. She appears well-developed and well-nourished.   HENT:   Head: Normocephalic and atraumatic.   Eyes: Conjunctivae are normal. Pupils are equal, round, and reactive to light.   Neck: Normal range of motion. Neck supple.   Cardiovascular: Normal rate and regular rhythm.    Pulmonary/Chest: Effort normal and breath sounds normal.   Abdominal: Soft. Bowel sounds are normal.   Musculoskeletal: Normal range of motion.   Neurological: She is alert and oriented to person, place, and time.   Skin: Skin is warm and dry.   Psychiatric: She has a normal mood and affect. Her behavior is normal. Judgment and thought content normal.   Nursing note and vitals reviewed.    Labs:  Recent Results (from the past 24 hour(s))   CBC with Differential    Collection Time: 17  3:47 PM   Result Value Ref Range    WBC 13.5 (H) 4.8 - 10.8 K/uL    RBC 3.97 (L) 4.20 - 5.40 M/uL     Hemoglobin 12.2 12.0 - 16.0 g/dL    Hematocrit 36.3 (L) 37.0 - 47.0 %    MCV 91.4 81.4 - 97.8 fL    MCH 30.7 27.0 - 33.0 pg    MCHC 33.6 33.6 - 35.0 g/dL    RDW 48.5 35.9 - 50.0 fL    Platelet Count 131 (L) 164 - 446 K/uL    MPV 10.7 9.0 - 12.9 fL    Neutrophils-Polys 88.50 (H) 44.00 - 72.00 %    Lymphocytes 4.40 (L) 22.00 - 41.00 %    Monocytes 6.60 0.00 - 13.40 %    Eosinophils 0.00 0.00 - 6.90 %    Basophils 0.10 0.00 - 1.80 %    Immature Granulocytes 0.40 0.00 - 0.90 %    Nucleated RBC 0.00 /100 WBC    Neutrophils (Absolute) 11.97 (H) 2.00 - 7.15 K/uL    Lymphs (Absolute) 0.60 (L) 1.00 - 4.80 K/uL    Monos (Absolute) 0.90 (H) 0.00 - 0.85 K/uL    Eos (Absolute) 0.00 0.00 - 0.51 K/uL    Baso (Absolute) 0.02 0.00 - 0.12 K/uL    Immature Granulocytes (abs) 0.05 0.00 - 0.11 K/uL    NRBC (Absolute) 0.00 K/uL   Comp Metabolic Panel (CMP)    Collection Time: 08/10/17  5:34 AM   Result Value Ref Range    Sodium 135 135 - 145 mmol/L    Potassium 4.4 3.6 - 5.5 mmol/L    Chloride 101 96 - 112 mmol/L    Co2 26 20 - 33 mmol/L    Anion Gap 8.0 0.0 - 11.9    Glucose 140 (H) 65 - 99 mg/dL    Bun 9 8 - 22 mg/dL    Creatinine 0.55 0.50 - 1.40 mg/dL    Calcium 8.8 8.5 - 10.5 mg/dL    AST(SGOT) 609 (H) 12 - 45 U/L    ALT(SGPT) 271 (H) 2 - 50 U/L    Alkaline Phosphatase 70 30 - 99 U/L    Total Bilirubin 1.0 0.1 - 1.5 mg/dL    Albumin 3.7 3.2 - 4.9 g/dL    Total Protein 5.9 (L) 6.0 - 8.2 g/dL    Globulin 2.2 1.9 - 3.5 g/dL    A-G Ratio 1.7 g/dL   CBC with Differential    Collection Time: 08/10/17  5:34 AM   Result Value Ref Range    WBC 15.2 (H) 4.8 - 10.8 K/uL    RBC 4.10 (L) 4.20 - 5.40 M/uL    Hemoglobin 12.3 12.0 - 16.0 g/dL    Hematocrit 38.6 37.0 - 47.0 %    MCV 94.1 81.4 - 97.8 fL    MCH 30.0 27.0 - 33.0 pg    MCHC 31.9 (L) 33.6 - 35.0 g/dL    RDW 50.4 (H) 35.9 - 50.0 fL    Platelet Count 146 (L) 164 - 446 K/uL    MPV 10.9 9.0 - 12.9 fL    Nucleated RBC 0.00 /100 WBC    NRBC (Absolute) 0.00 K/uL    Neutrophils-Polys 82.90 (H)  44.00 - 72.00 %    Lymphocytes 3.60 (L) 22.00 - 41.00 %    Monocytes 9.90 0.00 - 13.40 %    Eosinophils 0.00 0.00 - 6.90 %    Basophils 0.00 0.00 - 1.80 %    Neutrophils (Absolute) 13.15 (H) 2.00 - 7.15 K/uL    Lymphs (Absolute) 0.55 (L) 1.00 - 4.80 K/uL    Monos (Absolute) 1.50 (H) 0.00 - 0.85 K/uL    Eos (Absolute) 0.00 0.00 - 0.51 K/uL    Baso (Absolute) 0.00 0.00 - 0.12 K/uL    Anisocytosis 1+    ESTIMATED GFR    Collection Time: 08/10/17  5:34 AM   Result Value Ref Range    GFR If African American >60 >60 mL/min/1.73 m 2    GFR If Non African American >60 >60 mL/min/1.73 m 2   DIFFERENTIAL MANUAL    Collection Time: 08/10/17  5:34 AM   Result Value Ref Range    Bands-Stabs 3.60 0.00 - 10.00 %    Manual Diff Status PERFORMED    PERIPHERAL SMEAR REVIEW    Collection Time: 08/10/17  5:34 AM   Result Value Ref Range    Peripheral Smear Review see below    PLATELET ESTIMATE    Collection Time: 08/10/17  5:34 AM   Result Value Ref Range    Plt Estimation Decreased    MORPHOLOGY    Collection Time: 08/10/17  5:34 AM   Result Value Ref Range    RBC Morphology Present     Poikilocytosis 1+     Ovalocytes 1+     Tear Drop Cells 1+     Smudge Cells Few      Medical Decision Making, by Problem:  Active Hospital Problems    Diagnosis   • Malignant neoplasm of body of pancreas (CMS-HCC) [C25.1]     Plan:  Agree with CC Team- not ready for floor  Needs pain management tune up  Start clears    Quality Measures:  Medications reviewed and Labs reviewed  Owens catheter: Epidural Catheter / Drain      DVT Prophylaxis: Enoxaparin (Lovenox)  DVT prophylaxis - mechanical: SCDs  Ulcer prophylaxis: Yes    Assessed for rehab: Patient returned to prior level of function, rehabilitation not indicated at this time      Discussed patient condition with Family and RN

## 2017-08-10 NOTE — CARE PLAN
Problem: Communication  Goal: The ability to communicate needs accurately and effectively will improve  Outcome: PROGRESSING AS EXPECTED  Pt alert and oriented, able to communicate needs clearly at this time.    Problem: Safety  Goal: Will remain free from falls  Outcome: PROGRESSING AS EXPECTED  Pt remains free from falls throughout hospitalization - bed in lowest position, bed alarm on, treaded socks and appropriate rails in use, call bell within reach. Pt rounded on hourly to address any needs.

## 2017-08-10 NOTE — OP REPORT
DATE OF SERVICE:  08/09/2017    INDICATIONS:  The patient is a 57-year-old female patient who is known to me   after having been diagnosed with locally advanced pancreatic cancer.  There   appeared to be involvement of the portal vein, SMV junction, and the splenic   vein as well as questionable celiac trunk involvement.  The patient did   undergo neoadjuvant therapy and the patient was adamant about not returning to   neoadjuvant therapy and wanted to attempt an exploratory laparotomy and   possible resection.    SURGEON:  Gera Lancaster MD    ASSISTANT SURGEON:  Nicolás Vila MD    ANESTHESIA:  General plus epidural for postoperative pain management.    ESTIMATED BLOOD LOSS:  250 mL.    COMPLICATIONS:  None.    FINDINGS:  The patient did have an involvement of the portal vein, SMV   junction, which was resected and repaired by Dr. Vila, who will dictate a   separate dictation and I was the assistant on that procedure.  There was   involvement of the adventitia of the celiac trunk at the origin of the splenic   artery, which was resected as well.    PROCEDURES PERFORMED:  1.  Exploratory laparotomy.  2.  Extended distal pancreaticosplenectomy.  3.  Portal vein resection.  4.  Portal vein repair.  5.  Omental flap.    EBL is again as stated above.    SPECIMENS:  Sent off in different sections, celiac trunk nodes, mesenteric   nodes, tail and spleen sent separately, body and neck of the pancreas sent   separately.  Margins were marked.    DESCRIPTION OF THE PROCEDURE:  The patient was brought to OR, placed under   general anesthetic after having an epidural placed for postoperative pain   management.  Both arms are out.  Owens catheter placed, prepped with   chlorhexidine prep, covered with sterile drapes, given preoperative   antibiotics.  Time-out was done, which was adequate.  At that point, an   incision was made from the xiphoid down to the level of the umbilicus.  Upon   entering the abdomen, it  was immediately explored.  The falciform ligament was   resected using LigaSure and sent off to pathology.  Liver appeared clean.  No   sign of metastatic disease.  Peritoneum was clean.  We ran the small bowel   that was negative from Ligament of Treitz all the way down to the ileocecal   valve and the colon appeared clean.  Peritoneum along the abdomen and pelvis   was clean.  Omentum was clean.  At that point, we elected to go ahead and   proceed with entering the lesser sac with the LigaSure, taking all the way up   short gastrics till the left crura and then down through to the level of the   duodenum and pylorus.  Once that was completed, we immediately identified the   tumor.  We did intraoperative ultrasound, identified the margin, which would   be along the neck, but it appeared as though there may be some involvement of   the portal venous confluence at the splenic vein, SMV, portal vein junction.    Splenic vein appeared to be obstructed by the tumor.  So, at that point, we   started incising the inferior margin of the pancreas at the tail and worked   our way medially.  There was significant amount of inflammatory changes and   this body tumor was fairly large and caused significant contraction of vessels   including the middle colic vein, which was ligated in order to access the   underlying portion of the tumor.  We carried the dissection until we   encountered the SMV.  The SMV was identified.  Anterior surface of the SMV was   dissected free and we were able to do that completely all the way through to   this anterior surface.  It appears as though the tumor was invading and   involving the patient's left side of the portal venous SMV junction.  Once we   had completed that, we placed a vessel loop.  We went in cephalad, dissected   free the gastrohepatic ligament using the LigaSure as well as identified the   common hepatic artery.  We traced it back from the GDA because we were able to   identify  DELBERT from dissecting the area of the pancreatic neck proximally until   we encountered what appeared to be the common hepatic artery, celiac trunk   attached to the tumor.  At that point, we felt that based on ultrasound   evaluation and assessment that we could resect the portion of the portal vein   and do a primary repair, but also possibly resect a portion of the celiac   trunk and repair that, so that was completed.  We then did a splenectomy along   with the tail of pancreas, care being taken to avoid entrance into the   pancreatic tumor.  This allowed it to get inspection and approach both portal   vein and celiac trunk from both directions, left and right, without having the   spleen intact and tail.  At that point, we then transected the neck of the   pancreas using an Endo-CARMENZA 45 white load x2.  Once we were able to do that, we   identified the anterior surface of the SMV portal vein junction and continued   our dissection laterally until we encountered the takeoff of the splenic vein   where the tumor was attached.  At that point, we ligated the ileocolic vein   with two 2-0 silk sutures and any other collateral vessels and/or lymphatics   that were going straight for the tumor, involved in the tumor inferiorly.  We   continued the dissection posterior to the tumor.  We then proceeded with   mobilizing the cephalad portion of the tumor away from the celiac trunk, but   we then purposed to resect the portion of the portal vein, SMV junction with   the splenic vein and repair that primarily.  That is when, Dr. Vila came in.    He will dictate that portion of the procedure.  Once that was completed, we   then were able to dissect the common hepatic artery and the celiac trunk off   of the tumor.  It appears as though it had grown or at least attached with   desmoplastic reaction to the adventitia of the vessels, but not direct   invasion.  It did essentially encase the splenic artery coming right off the    celiac trunk, so we were able to dissect down the celiac trunk, clamped it   with a right angle clamp and cut it and marked the margins and sent off to   pathology.  The pathologist was called into the room in order to assess and   explain the margins and why there were 2 separate specimens of pancreas.  At   that point, as of note, we did notice that it appears as though this mass had   grown down into what appeared to be possibly the uncinate of the pancreas and   so, in order to get negative margins, we had to take a portion of the uncinate   of the pancreas, which was then closed with 3-0 PDS closing the capsule   around our resection margin to get a negative margin.  At that point, we   irrigated with copious amounts of fluid.  Dr. Vila had completed the portal   vein resection and repair.  We did ligate the splenic ____ artery at its   origin and everything was dry.  We placed a 19-Bengali round Nemesio from the   left upper quadrant into the area of the retroperitoneum and near the   pancreatic neck.  We also noted that the patient had an inflammatory change   around her gallbladder, so we did do an open cholecystectomy and there was   some thickening of the gallbladder, possibly related to chronic cholecystitis   secondary to chemotherapy, so at that point, we did the dome down technique   and took down the gallbladder all the way to level of the cystic artery.  We   placed 3 clips across both of these because they were extremely small and   transected the specimen and sent it off to pathology separately.  Bovie   electrocautery for hemostasis along the cystic plate.  All needle, sponge   counts and instrument counts were correct.  We placed the stomach back in its   original anatomic position after removing the Omni retractor.  We ran the   small bowel from the ligament of Treitz distally showing no twisting or   volvulus.  We had taken down the splenic flexure of the colon to do the   splenectomy and  placed it back in its left upper quadrant.    We then took the omentum off the transverse mesocolon, transected on the left   side of the colon near the splenic flexure and mobilized it and placed in the   retroperitoneum to lay on top of our drain overlying our transection margin   and our uncinate transection margin point.  The operation was completed.  All   needle, sponge counts, and instrument counts were correct.  The liver appeared   to have no sign of ischemia and there appeared to be no leak from the   pancreatic uncinate and/or neck.  At that point, we then closed the abdomen   with looped PDS from both directions tying in the center.  The drain was   secured with 2-0 nylon, and then, at that point, the skin was closed with   staples throughout.  The patient tolerated the procedure extremely well, was   extubated, and transferred to recovery.       ____________________________________     MD WICHO RAMEY / ADRIAN    DD:  08/09/2017 15:22:36  DT:  08/09/2017 17:20:10    D#:  [id number]  Job#:  [id number]    cc: MARIA DEL CARMEN LEPE MD

## 2017-08-10 NOTE — THERAPY
"Occupational Therapy Evaluation completed.   Functional Status:  Supine to sit with HOB raised with Min A. Sit to stand with CGA. Pt walked unit hallway with FWW and CGA. No signs or symptoms of SOB and appeared stable. Pt fatigued quickly and requested to return to room. Pt washed face seated in chair. Pt educated on importance of OOB activity to increase activity tolerance and use of shower chair during recovery for fall prevention. Pt left seated in chair. Pt indicated pain during tx with intermittent moaning and had urge to vomit throughout tx.   Plan of Care: Will benefit from Occupational Therapy 3 times per week  Discharge Recommendations:  Equipment: Will Continue to Assess for Equipment Needs. Post-acute therapy Discharge to a transitional care facility for continued skilled therapy services.    See \"Rehab Therapy-Acute\" Patient Summary Report for complete documentation.    "

## 2017-08-10 NOTE — CARE PLAN
Problem: Communication  Goal: The ability to communicate needs accurately and effectively will improve  Outcome: PROGRESSING AS EXPECTED  Pt drowsy but arousable and oriented, able to communicate needs clearly at this time.    Problem: Pain Management  Goal: Pain level will decrease to patient’s comfort goal  Outcome: PROGRESSING SLOWER THAN EXPECTED  Pt c/o constant 4/10 abd pain, which becomes intolerable at times at which point pt moans and rolls in bed - epidural in place with gtt infusing. Boluses, PCA doses and xanax administered overnight for relief. Will continue to reassess.

## 2017-08-10 NOTE — THERAPY
"Physical Therapy Evaluation completed.   Bed Mobility:  Supine to Sit: Minimal Assist (With HOB raised)  Transfers: Sit to Stand: Contact Guard Assist  Gait: Level Of Assist: Supervised with No Equipment Needed       Plan of Care: Will benefit from Physical Therapy 3 times per week  Discharge Recommendations: Equipment: Will Continue to Assess for Equipment Needs.  See \"Rehab Therapy-Acute\" Patient Summary Report for complete documentation.     "

## 2017-08-11 LAB
ALBUMIN SERPL BCP-MCNC: 3.1 G/DL (ref 3.2–4.9)
ALBUMIN/GLOB SERPL: 1.4 G/DL
ALP SERPL-CCNC: 64 U/L (ref 30–99)
ALT SERPL-CCNC: 113 U/L (ref 2–50)
AMYLASE FLD-CCNC: 161 U/L
ANION GAP SERPL CALC-SCNC: 8 MMOL/L (ref 0–11.9)
ANISOCYTOSIS BLD QL SMEAR: ABNORMAL
AST SERPL-CCNC: 96 U/L (ref 12–45)
BASOPHILS # BLD AUTO: 0 % (ref 0–1.8)
BASOPHILS # BLD: 0 K/UL (ref 0–0.12)
BILIRUB SERPL-MCNC: 0.7 MG/DL (ref 0.1–1.5)
BODY FLD TYPE: NORMAL
BUN SERPL-MCNC: 7 MG/DL (ref 8–22)
CALCIUM SERPL-MCNC: 8.7 MG/DL (ref 8.5–10.5)
CHLORIDE SERPL-SCNC: 95 MMOL/L (ref 96–112)
CO2 SERPL-SCNC: 27 MMOL/L (ref 20–33)
CREAT SERPL-MCNC: 0.38 MG/DL (ref 0.5–1.4)
EOSINOPHIL # BLD AUTO: 0 K/UL (ref 0–0.51)
EOSINOPHIL NFR BLD: 0 % (ref 0–6.9)
ERYTHROCYTE [DISTWIDTH] IN BLOOD BY AUTOMATED COUNT: 49.5 FL (ref 35.9–50)
GFR SERPL CREATININE-BSD FRML MDRD: >60 ML/MIN/1.73 M 2
GLOBULIN SER CALC-MCNC: 2.2 G/DL (ref 1.9–3.5)
GLUCOSE SERPL-MCNC: 123 MG/DL (ref 65–99)
HCT VFR BLD AUTO: 32.3 % (ref 37–47)
HGB BLD-MCNC: 10.4 G/DL (ref 12–16)
LYMPHOCYTES # BLD AUTO: 2.17 K/UL (ref 1–4.8)
LYMPHOCYTES NFR BLD: 8.8 % (ref 22–41)
MANUAL DIFF BLD: NORMAL
MCH RBC QN AUTO: 29.8 PG (ref 27–33)
MCHC RBC AUTO-ENTMCNC: 32.2 G/DL (ref 33.6–35)
MCV RBC AUTO: 92.6 FL (ref 81.4–97.8)
MICROCYTES BLD QL SMEAR: ABNORMAL
MONOCYTES # BLD AUTO: 1.73 K/UL (ref 0–0.85)
MONOCYTES NFR BLD AUTO: 7 % (ref 0–13.4)
MORPHOLOGY BLD-IMP: NORMAL
MYELOCYTES NFR BLD MANUAL: 0.9 %
NEUTROPHILS # BLD AUTO: 20.58 K/UL (ref 2–7.15)
NEUTROPHILS NFR BLD: 83.3 % (ref 44–72)
NRBC # BLD AUTO: 0 K/UL
NRBC BLD AUTO-RTO: 0 /100 WBC
OVALOCYTES BLD QL SMEAR: NORMAL
PLATELET # BLD AUTO: 155 K/UL (ref 164–446)
PLATELET BLD QL SMEAR: NORMAL
PMV BLD AUTO: 10.7 FL (ref 9–12.9)
POIKILOCYTOSIS BLD QL SMEAR: NORMAL
POTASSIUM SERPL-SCNC: 4.2 MMOL/L (ref 3.6–5.5)
PROT SERPL-MCNC: 5.3 G/DL (ref 6–8.2)
RBC # BLD AUTO: 3.49 M/UL (ref 4.2–5.4)
RBC BLD AUTO: PRESENT
SODIUM SERPL-SCNC: 130 MMOL/L (ref 135–145)
WBC # BLD AUTO: 24.7 K/UL (ref 4.8–10.8)

## 2017-08-11 PROCEDURE — 770001 HCHG ROOM/CARE - MED/SURG/GYN PRIV*

## 2017-08-11 PROCEDURE — A9270 NON-COVERED ITEM OR SERVICE: HCPCS | Performed by: SURGERY

## 2017-08-11 PROCEDURE — 700111 HCHG RX REV CODE 636 W/ 250 OVERRIDE (IP): Performed by: ANESTHESIOLOGY

## 2017-08-11 PROCEDURE — 85027 COMPLETE CBC AUTOMATED: CPT

## 2017-08-11 PROCEDURE — 85007 BL SMEAR W/DIFF WBC COUNT: CPT

## 2017-08-11 PROCEDURE — 700102 HCHG RX REV CODE 250 W/ 637 OVERRIDE(OP): Performed by: SURGERY

## 2017-08-11 PROCEDURE — 700111 HCHG RX REV CODE 636 W/ 250 OVERRIDE (IP): Performed by: SURGERY

## 2017-08-11 PROCEDURE — 99233 SBSQ HOSP IP/OBS HIGH 50: CPT | Performed by: SURGERY

## 2017-08-11 PROCEDURE — 90670 PCV13 VACCINE IM: CPT | Performed by: SURGERY

## 2017-08-11 PROCEDURE — 82150 ASSAY OF AMYLASE: CPT

## 2017-08-11 PROCEDURE — 700105 HCHG RX REV CODE 258: Performed by: SURGERY

## 2017-08-11 PROCEDURE — 80053 COMPREHEN METABOLIC PANEL: CPT

## 2017-08-11 PROCEDURE — 3E0234Z INTRODUCTION OF SERUM, TOXOID AND VACCINE INTO MUSCLE, PERCUTANEOUS APPROACH: ICD-10-PCS | Performed by: SURGERY

## 2017-08-11 PROCEDURE — 90471 IMMUNIZATION ADMIN: CPT

## 2017-08-11 RX ORDER — MORPHINE SULFATE 15 MG/1
15 TABLET, FILM COATED, EXTENDED RELEASE ORAL EVERY 12 HOURS
Status: DISCONTINUED | OUTPATIENT
Start: 2017-08-11 | End: 2017-08-15 | Stop reason: HOSPADM

## 2017-08-11 RX ORDER — KETOROLAC TROMETHAMINE 10 MG/1
10 TABLET, FILM COATED ORAL EVERY 6 HOURS PRN
Status: DISCONTINUED | OUTPATIENT
Start: 2017-08-11 | End: 2017-08-15 | Stop reason: HOSPADM

## 2017-08-11 RX ORDER — POLYETHYLENE GLYCOL 3350 17 G/17G
1 POWDER, FOR SOLUTION ORAL DAILY
Status: DISCONTINUED | OUTPATIENT
Start: 2017-08-11 | End: 2017-08-15 | Stop reason: HOSPADM

## 2017-08-11 RX ORDER — OXYCODONE HYDROCHLORIDE 5 MG/1
5 TABLET ORAL
Status: DISCONTINUED | OUTPATIENT
Start: 2017-08-11 | End: 2017-08-15 | Stop reason: HOSPADM

## 2017-08-11 RX ORDER — OXYCODONE HYDROCHLORIDE 10 MG/1
10 TABLET ORAL
Status: DISCONTINUED | OUTPATIENT
Start: 2017-08-11 | End: 2017-08-15 | Stop reason: HOSPADM

## 2017-08-11 RX ADMIN — ONDANSETRON 4 MG: 2 INJECTION INTRAMUSCULAR; INTRAVENOUS at 08:35

## 2017-08-11 RX ADMIN — SODIUM CHLORIDE, POTASSIUM CHLORIDE, SODIUM LACTATE AND CALCIUM CHLORIDE: 600; 310; 30; 20 INJECTION, SOLUTION INTRAVENOUS at 08:39

## 2017-08-11 RX ADMIN — MORPHINE SULFATE 15 MG: 15 TABLET, EXTENDED RELEASE ORAL at 13:17

## 2017-08-11 RX ADMIN — MORPHINE SULFATE 15 MG: 15 TABLET, EXTENDED RELEASE ORAL at 21:11

## 2017-08-11 RX ADMIN — HYDROMORPHONE HYDROCHLORIDE: 2 INJECTION INTRAMUSCULAR; INTRAVENOUS; SUBCUTANEOUS at 07:14

## 2017-08-11 RX ADMIN — DEXAMETHASONE SODIUM PHOSPHATE 4 MG: 4 INJECTION, SOLUTION INTRAMUSCULAR; INTRAVENOUS at 10:19

## 2017-08-11 RX ADMIN — KETOROLAC TROMETHAMINE 10 MG: 10 TABLET, FILM COATED ORAL at 13:17

## 2017-08-11 RX ADMIN — LORAZEPAM 1 MG: 1 TABLET ORAL at 21:12

## 2017-08-11 RX ADMIN — ONDANSETRON 4 MG: 2 INJECTION INTRAMUSCULAR; INTRAVENOUS at 02:12

## 2017-08-11 RX ADMIN — OXYCODONE HYDROCHLORIDE 10 MG: 10 TABLET ORAL at 17:14

## 2017-08-11 RX ADMIN — ENOXAPARIN SODIUM 40 MG: 100 INJECTION SUBCUTANEOUS at 13:17

## 2017-08-11 RX ADMIN — PNEUMOCOCCAL 13-VALENT CONJUGATE VACCINE 0.5 ML: 2.2; 2.2; 2.2; 2.2; 2.2; 4.4; 2.2; 2.2; 2.2; 2.2; 2.2; 2.2; 2.2 INJECTION, SUSPENSION INTRAMUSCULAR at 15:39

## 2017-08-11 RX ADMIN — SODIUM CHLORIDE, POTASSIUM CHLORIDE, SODIUM LACTATE AND CALCIUM CHLORIDE: 600; 310; 30; 20 INJECTION, SOLUTION INTRAVENOUS at 18:08

## 2017-08-11 RX ADMIN — KETOROLAC TROMETHAMINE 10 MG: 10 TABLET, FILM COATED ORAL at 19:44

## 2017-08-11 RX ADMIN — OXYCODONE HYDROCHLORIDE 10 MG: 10 TABLET ORAL at 23:30

## 2017-08-11 RX ADMIN — ONDANSETRON 4 MG: 2 INJECTION INTRAMUSCULAR; INTRAVENOUS at 20:17

## 2017-08-11 ASSESSMENT — PAIN SCALES - GENERAL
PAINLEVEL_OUTOF10: 2
PAINLEVEL_OUTOF10: 7
PAINLEVEL_OUTOF10: 2
PAINLEVEL_OUTOF10: 6
PAINLEVEL_OUTOF10: 7
PAINLEVEL_OUTOF10: 6
PAINLEVEL_OUTOF10: 6
PAINLEVEL_OUTOF10: 10
PAINLEVEL_OUTOF10: 4
PAINLEVEL_OUTOF10: 9
PAINLEVEL_OUTOF10: 5
PAINLEVEL_OUTOF10: 6
PAINLEVEL_OUTOF10: 5
PAINLEVEL_OUTOF10: 10
PAINLEVEL_OUTOF10: 5
PAINLEVEL_OUTOF10: 4

## 2017-08-11 ASSESSMENT — ENCOUNTER SYMPTOMS
MYALGIAS: 1
WEAKNESS: 0
NAUSEA: 0
NERVOUS/ANXIOUS: 1
NECK PAIN: 0
FEVER: 0
SHORTNESS OF BREATH: 0
BACK PAIN: 0
NEUROLOGICAL NEGATIVE: 1
CHILLS: 0
WHEEZING: 0
ABDOMINAL PAIN: 1
HEARTBURN: 0
PALPITATIONS: 0
COUGH: 0

## 2017-08-11 ASSESSMENT — LIFESTYLE VARIABLES: DO YOU DRINK ALCOHOL: NO

## 2017-08-11 NOTE — CARE PLAN
Problem: Nutritional:  Goal: Achieve adequate nutritional intake  Patient will tolerated PO diet and consume 50% of meals  Outcome: PROGRESSING SLOWER THAN EXPECTED

## 2017-08-11 NOTE — PROGRESS NOTES
"  Trauma/Surgical Progress Note    Author: Fuentes Love Date & Time created: 8/10/2017   7:37 PM     Interval Events:  Extended distal pancreatectomy and splenectomy yesterday  Excessive pain: Improving  Anxiety    Review of Systems   Constitutional: Positive for malaise/fatigue.   Respiratory: Negative for cough, shortness of breath and wheezing.    Cardiovascular: Negative for chest pain, palpitations and leg swelling.   Gastrointestinal: Positive for heartburn, nausea and abdominal pain.   Musculoskeletal: Positive for myalgias. Negative for back pain, joint pain and neck pain.   Neurological: Negative.  Negative for weakness.     Hemodynamics:  Blood pressure 133/76, pulse 72, temperature 36.6 °C (97.8 °F), resp. rate 56, height 1.651 m (5' 5\"), weight 51.7 kg (113 lb 15.7 oz), SpO2 96 %.     Respiratory:    Respiration: (!) 56, Pulse Oximetry: 96 %           Fluids:    Intake/Output Summary (Last 24 hours) at 08/10/17 1937  Last data filed at 08/10/17 1800   Gross per 24 hour   Intake   3620 ml   Output   1360 ml   Net   2260 ml     Admit Weight: 60.1 kg (132 lb 7.9 oz)  Current Weight: 51.7 kg (113 lb 15.7 oz)    Physical Exam   Constitutional: She is oriented to person, place, and time. She is active and cooperative. No distress. Nasal cannula in place.   HENT:   Head: Normocephalic.   Mouth/Throat: Oropharynx is clear and moist.   Eyes: Conjunctivae are normal. No scleral icterus.   Neck: Normal range of motion. No tracheal deviation present.   Pulmonary/Chest: Effort normal. No stridor. No respiratory distress. She has decreased breath sounds in the right lower field and the left lower field. She has no wheezes. She has no rhonchi.   Abdominal: Soft. She exhibits no distension. There is tenderness.   MICHELLE serosanguineous   Musculoskeletal: Normal range of motion. She exhibits no edema or tenderness.   Neurological: She is alert and oriented to person, place, and time.   Skin: Skin is warm and dry. There is " pallor.       Medical Decision Making/Problem List:    Active Hospital Problems    Diagnosis   • Postoperative pain [G89.18]     Priority: Medium     Baseline chronic pain on Norco  Epidural placed at the time of surgery  Patient was significant complaints of ongoing pain despite changes in epidural regimen  Seemed more comfortable in the afternoon  Trend closely     • Anxiety [F41.9]     Priority: Medium     Premorbid  On lorazepam at home: Restarted     • Elevated transaminase level [R74.0]     Priority: Medium     Postop  Trend     • Malignant neoplasm of body of pancreas (CMS-HCC) [C25.1]     Core Measures & Quality Metrics:  Labs reviewed, Medications reviewed and Radiology images reviewed        DVT Prophylaxis: Enoxaparin (Lovenox)  DVT prophylaxis - mechanical: SCDs  Ulcer prophylaxis: Yes        ELLEN Score  Discussed patient condition with RN, RT, Pharmacy, Patient and general surgery.

## 2017-08-11 NOTE — CARE PLAN
Problem: Urinary Elimination:  Goal: Ability to reestablish a normal urinary elimination pattern will improve  Intervention: Evaluate need to continue indwelling urinary catheter  Discussed removal of mcrae at rounds and possibility of transfer therafter. Mcrae top be removed prior to transfer to floor unit; urine output adequate thus far. Pt appropriate and thus able to verbalize when she needs to urinate.       Problem: Skin Integrity  Goal: Risk for impaired skin integrity will decrease  Intervention: Assess risk factors for impaired skin integrity and/or pressure ulcers  Pt turns self from side to side; Q2h reminders in place while pt in bed/ Mobility educated upon and encouraged both for shifting of weight/pressure on bony prominences and pulmonary toileting. Switched pt from regular to silicone nasal cannula with ear protectors. Pillows used for repositioning. Midline abdominal incision and MICHELLE site visualized with MD and throughout shift for breakdown/additional drainage.

## 2017-08-11 NOTE — PROGRESS NOTES
Surgical Progress Note    Author: Gera Lancaster Date & Time created: 2017   9:32 AM     Interval Events:  POD 2  Tolerating po fulls  Epidural removed because not working- feels better but a bit anxious   Review of Systems   Gastrointestinal: Positive for abdominal pain.   Psychiatric/Behavioral: The patient is nervous/anxious.    All other systems reviewed and are negative.    Hemodynamics:  No data recorded.  Monitored Temp: 37.6 °C (99.7 °F)  Pulse  Av.2  Min: 54  Max: 101Heart Rate (Monitored): 70  NIBP: 116/63 mmHg     Respiratory:    Respiration: 16, Pulse Oximetry: 95 %           Neuro:  GCS Total John Coma Score: 15     Fluids:    Intake/Output Summary (Last 24 hours) at 17 0932  Last data filed at 17 0800   Gross per 24 hour   Intake   2970 ml   Output   1175 ml   Net   1795 ml     Weight: 52.3 kg (115 lb 4.8 oz)  Current Diet Order   Procedures   • DIET ORDER     Physical Exam   Constitutional: She is oriented to person, place, and time. She appears well-developed and well-nourished.   HENT:   Head: Normocephalic and atraumatic.   Eyes: Conjunctivae are normal. Pupils are equal, round, and reactive to light.   Neck: Normal range of motion. Neck supple.   Cardiovascular: Normal rate and regular rhythm.    Pulmonary/Chest: Effort normal and breath sounds normal.   Abdominal: Soft. Bowel sounds are normal.   Musculoskeletal: Normal range of motion.   Neurological: She is alert and oriented to person, place, and time.   Skin: Skin is warm and dry.   Psychiatric: She has a normal mood and affect. Her behavior is normal. Judgment and thought content normal.   Nursing note and vitals reviewed.    Labs:  Recent Results (from the past 24 hour(s))   CBC WITH DIFFERENTIAL    Collection Time: 17  9:05 AM   Result Value Ref Range    WBC 24.7 (H) 4.8 - 10.8 K/uL    RBC 3.49 (L) 4.20 - 5.40 M/uL    Hemoglobin 10.4 (L) 12.0 - 16.0 g/dL    Hematocrit 32.3 (L) 37.0 - 47.0 %    MCV  92.6 81.4 - 97.8 fL    MCH 29.8 27.0 - 33.0 pg    MCHC 32.2 (L) 33.6 - 35.0 g/dL    RDW 49.5 35.9 - 50.0 fL    Platelet Count 155 (L) 164 - 446 K/uL    MPV 10.7 9.0 - 12.9 fL    Nucleated RBC 0.00 /100 WBC    NRBC (Absolute) 0.00 K/uL     Medical Decision Making, by Problem:  Active Hospital Problems    Diagnosis   • Postoperative pain [G89.18]     Priority: Medium     Baseline chronic pain on Norco  Epidural placed at the time of surgery  Patient was significant complaints of ongoing pain despite changes in epidural regimen  Seemed more comfortable in the afternoon  Trend closely     • Anxiety [F41.9]     Priority: Medium     Premorbid  On lorazepam at home: Restarted     • Elevated transaminase level [R74.0]     Priority: Medium     Postop  Trend     • Malignant neoplasm of body of pancreas (CMS-HCC) [C25.1]     Plan:  To [facility]  general diet   MICHELLE drain scant    Quality Measures:  Medications reviewed and Labs reviewed  Owens catheter: No Owens  Central line in place: Chemotherapy    DVT Prophylaxis: Enoxaparin (Lovenox)  DVT prophylaxis - mechanical: SCDs  Ulcer prophylaxis: Yes    Assessed for rehab: Patient returned to prior level of function, rehabilitation not indicated at this time      Discussed patient condition with Family and RN

## 2017-08-11 NOTE — CARE PLAN
Problem: Knowledge Deficit  Goal: Knowledge of the prescribed therapeutic regimen will improve  Pt educated on PCA and POC    Problem: Pain Management  Goal: Pain level will decrease to patient’s comfort goal  Assess pts pain level and treat as needed

## 2017-08-11 NOTE — PROGRESS NOTES
"  Trauma/Surgical Progress Note    Author: Fuentes Love Date & Time created: 8/11/2017   2:38 PM     Interval Events:  Epidural removed  Owens removed  Oral pain medication started  Advancing diet    Review of Systems   Constitutional: Negative for fever, chills and malaise/fatigue.   Respiratory: Negative for cough, shortness of breath and wheezing.    Cardiovascular: Negative for chest pain, palpitations and leg swelling.   Gastrointestinal: Positive for abdominal pain. Negative for heartburn and nausea.   Musculoskeletal: Positive for myalgias. Negative for back pain, joint pain and neck pain.   Neurological: Negative.  Negative for weakness.     Hemodynamics:  Blood pressure 133/76, pulse 63, temperature 36.6 °C (97.8 °F), resp. rate 16, height 1.651 m (5' 5\"), weight 52.3 kg (115 lb 4.8 oz), SpO2 86 %.     Respiratory:    Respiration: 16, Pulse Oximetry: (!) 86 %           Fluids:    Intake/Output Summary (Last 24 hours) at 08/11/17 1438  Last data filed at 08/11/17 1200   Gross per 24 hour   Intake   2770 ml   Output   1355 ml   Net   1415 ml     Admit Weight: 60.1 kg (132 lb 7.9 oz)  Current Weight: 52.3 kg (115 lb 4.8 oz)    Physical Exam   Constitutional: She is oriented to person, place, and time. She is active and cooperative. No distress. Nasal cannula in place.   HENT:   Head: Normocephalic.   Mouth/Throat: Oropharynx is clear and moist.   Eyes: Conjunctivae are normal. No scleral icterus.   Neck: Normal range of motion. No tracheal deviation present.   Pulmonary/Chest: Effort normal. No stridor. No respiratory distress. She has decreased breath sounds in the right lower field and the left lower field. She has no wheezes. She has no rhonchi.   Abdominal: Soft. She exhibits no distension. There is tenderness.   MICHELLE 90 mL serous  Incision CDI   Musculoskeletal: Normal range of motion. She exhibits no edema or tenderness.   Neurological: She is alert and oriented to person, place, and time.   Skin: Skin " is warm and dry. There is pallor.       Medical Decision Making/Problem List:    Active Hospital Problems    Diagnosis   • Postoperative pain [G89.18]     Priority: Medium     Baseline chronic pain on Norco  Epidural placed at the time of surgery  Patient was significant complaints of ongoing pain despite changes in epidural regimen  8/11 epidural not working well, so was removed  Restart Lovenox in the morning  PRN IV and oral medications with Toradol: Reassess     • Anxiety [F41.9]     Priority: Medium     Premorbid  On lorazepam at home: Restarted  8/11 still anxious: Increase frequency of prn medication     • Elevated transaminase level [R74.0]     Priority: Medium     Postop elevation of transaminases  8/11 normalizing  Trend     • Malignant neoplasm of body of pancreas (CMS-HCC) [C25.1]     Invasive neoplasm at the neck of the pancreas  8/9 Extended distal pancreatectomy with splenectomy and resection of IMV  Surgical oncology: [facility]       Patient medically stable for transfer to surgical floor    Core Measures & Quality Metrics:  Labs reviewed, Medications reviewed and Radiology images reviewed        DVT Prophylaxis: Enoxaparin (Lovenox)  DVT prophylaxis - mechanical: SCDs  Ulcer prophylaxis: Yes    Assessed for rehab: Patient was assess for and/or received rehabilitation services during this hospitalization    ELLEN Score  Discussed patient condition with RN, RT, Pharmacy, Patient and general surgery.

## 2017-08-11 NOTE — DIETARY
"Nutrition Services: Patient seen for poor PO and wt loss prior to admit    57 y.o. female with admitting DX of PANCREATIC CA s/p Partial resection of portal vein with direct repair, exploratory laparotomy, pancreaticosplenectomy, omental flap(8/9)    Height: 165.1 cm (5' 5\")  Weight: 52.3 kg (115 lb 4.8 oz)  Body mass index is 19.19 kg/(m^2).    Patient stated she lost approximately 20# in the past six months, but weight loss has stopped and she has started to gain weight.  Very low appetite currently but wanted to try something besides clear liquids.    Patient was on a clear liquid diet, just advanced.  Labs, medications and past medical history reviewed.    Recommend:  Encourage PO as tolerated.  Nutrition Representative will continue to see her for ongoing meal and snack preferences.  RD following.      "

## 2017-08-12 LAB
ALBUMIN SERPL BCP-MCNC: 2.8 G/DL (ref 3.2–4.9)
ALBUMIN/GLOB SERPL: 1.2 G/DL
ALP SERPL-CCNC: 75 U/L (ref 30–99)
ALT SERPL-CCNC: 68 U/L (ref 2–50)
ANION GAP SERPL CALC-SCNC: 8 MMOL/L (ref 0–11.9)
AST SERPL-CCNC: 47 U/L (ref 12–45)
BILIRUB SERPL-MCNC: 0.6 MG/DL (ref 0.1–1.5)
BUN SERPL-MCNC: 7 MG/DL (ref 8–22)
CALCIUM SERPL-MCNC: 8.3 MG/DL (ref 8.5–10.5)
CHLORIDE SERPL-SCNC: 99 MMOL/L (ref 96–112)
CO2 SERPL-SCNC: 28 MMOL/L (ref 20–33)
CREAT SERPL-MCNC: 0.41 MG/DL (ref 0.5–1.4)
ERYTHROCYTE [DISTWIDTH] IN BLOOD BY AUTOMATED COUNT: 48.7 FL (ref 35.9–50)
GFR SERPL CREATININE-BSD FRML MDRD: >60 ML/MIN/1.73 M 2
GLOBULIN SER CALC-MCNC: 2.3 G/DL (ref 1.9–3.5)
GLUCOSE SERPL-MCNC: 107 MG/DL (ref 65–99)
HCT VFR BLD AUTO: 30.6 % (ref 37–47)
HGB BLD-MCNC: 10 G/DL (ref 12–16)
MCH RBC QN AUTO: 29.9 PG (ref 27–33)
MCHC RBC AUTO-ENTMCNC: 32.7 G/DL (ref 33.6–35)
MCV RBC AUTO: 91.3 FL (ref 81.4–97.8)
PLATELET # BLD AUTO: 235 K/UL (ref 164–446)
PMV BLD AUTO: 11.7 FL (ref 9–12.9)
POTASSIUM SERPL-SCNC: 3.4 MMOL/L (ref 3.6–5.5)
PROT SERPL-MCNC: 5.1 G/DL (ref 6–8.2)
RBC # BLD AUTO: 3.35 M/UL (ref 4.2–5.4)
SODIUM SERPL-SCNC: 135 MMOL/L (ref 135–145)
WBC # BLD AUTO: 24.2 K/UL (ref 4.8–10.8)

## 2017-08-12 PROCEDURE — 770006 HCHG ROOM/CARE - MED/SURG/GYN SEMI*

## 2017-08-12 PROCEDURE — 80053 COMPREHEN METABOLIC PANEL: CPT

## 2017-08-12 PROCEDURE — A9270 NON-COVERED ITEM OR SERVICE: HCPCS | Performed by: SURGERY

## 2017-08-12 PROCEDURE — 700102 HCHG RX REV CODE 250 W/ 637 OVERRIDE(OP): Performed by: SURGERY

## 2017-08-12 PROCEDURE — 36415 COLL VENOUS BLD VENIPUNCTURE: CPT

## 2017-08-12 PROCEDURE — 700111 HCHG RX REV CODE 636 W/ 250 OVERRIDE (IP): Performed by: SURGERY

## 2017-08-12 PROCEDURE — 700105 HCHG RX REV CODE 258: Performed by: SURGERY

## 2017-08-12 PROCEDURE — 85027 COMPLETE CBC AUTOMATED: CPT

## 2017-08-12 RX ORDER — HALOPERIDOL 5 MG/ML
2-5 INJECTION INTRAMUSCULAR EVERY 6 HOURS PRN
Status: DISCONTINUED | OUTPATIENT
Start: 2017-08-12 | End: 2017-08-15 | Stop reason: HOSPADM

## 2017-08-12 RX ORDER — ZOLPIDEM TARTRATE 5 MG/1
5 TABLET ORAL NIGHTLY PRN
Status: DISCONTINUED | OUTPATIENT
Start: 2017-08-12 | End: 2017-08-15 | Stop reason: HOSPADM

## 2017-08-12 RX ADMIN — FLUOXETINE HYDROCHLORIDE 40 MG: 20 CAPSULE ORAL at 10:06

## 2017-08-12 RX ADMIN — MORPHINE SULFATE 15 MG: 15 TABLET, EXTENDED RELEASE ORAL at 10:06

## 2017-08-12 RX ADMIN — KETOROLAC TROMETHAMINE 10 MG: 10 TABLET, FILM COATED ORAL at 18:30

## 2017-08-12 RX ADMIN — OXYCODONE HYDROCHLORIDE 10 MG: 10 TABLET ORAL at 07:06

## 2017-08-12 RX ADMIN — SODIUM CHLORIDE, POTASSIUM CHLORIDE, SODIUM LACTATE AND CALCIUM CHLORIDE: 600; 310; 30; 20 INJECTION, SOLUTION INTRAVENOUS at 02:03

## 2017-08-12 RX ADMIN — ENOXAPARIN SODIUM 40 MG: 100 INJECTION SUBCUTANEOUS at 12:29

## 2017-08-12 RX ADMIN — OXYCODONE HYDROCHLORIDE 10 MG: 10 TABLET ORAL at 03:02

## 2017-08-12 RX ADMIN — POLYETHYLENE GLYCOL 3350 1 PACKET: 17 POWDER, FOR SOLUTION ORAL at 10:06

## 2017-08-12 RX ADMIN — LORAZEPAM 1 MG: 1 TABLET ORAL at 21:22

## 2017-08-12 RX ADMIN — LORAZEPAM 1 MG: 1 TABLET ORAL at 10:06

## 2017-08-12 RX ADMIN — OXYCODONE HYDROCHLORIDE 10 MG: 10 TABLET ORAL at 16:00

## 2017-08-12 RX ADMIN — MORPHINE SULFATE 15 MG: 15 TABLET, EXTENDED RELEASE ORAL at 21:22

## 2017-08-12 RX ADMIN — OXYCODONE HYDROCHLORIDE 10 MG: 10 TABLET ORAL at 10:06

## 2017-08-12 RX ADMIN — KETOROLAC TROMETHAMINE 10 MG: 10 TABLET, FILM COATED ORAL at 12:30

## 2017-08-12 ASSESSMENT — PAIN SCALES - GENERAL
PAINLEVEL_OUTOF10: 8
PAINLEVEL_OUTOF10: ASSUMED PAIN PRESENT
PAINLEVEL_OUTOF10: ASSUMED PAIN PRESENT
PAINLEVEL_OUTOF10: 7
PAINLEVEL_OUTOF10: 5
PAINLEVEL_OUTOF10: 5
PAINLEVEL_OUTOF10: 9
PAINLEVEL_OUTOF10: 6
PAINLEVEL_OUTOF10: 5

## 2017-08-12 ASSESSMENT — ENCOUNTER SYMPTOMS
ABDOMINAL PAIN: 1
VOMITING: 0
NAUSEA: 0

## 2017-08-12 NOTE — PROGRESS NOTES
Surgical Progress Note    Author: aNveed Walden Date & Time created: 2017   8:00 AM     Interval Events:  Pt groaning this am.   On high dose narcotics and still in pain.   Likely related to chronic use.   Review of Systems   Gastrointestinal: Positive for abdominal pain. Negative for nausea and vomiting.     Hemodynamics:  Temp (24hrs), Av.3 °C (99.1 °F), Min:36.8 °C (98.2 °F), Max:37.7 °C (99.9 °F)  Temperature: 36.8 °C (98.3 °F), Monitored Temp: 37.9 °C (100.2 °F)  Pulse  Av.6  Min: 54  Max: 101Heart Rate (Monitored): 77  Blood Pressure: 123/77 mmHg, NIBP: 109/66 mmHg     Respiratory:    Respiration: 20, Pulse Oximetry: 90 %           Neuro:  GCS Total John Coma Score: 15     Fluids:    Intake/Output Summary (Last 24 hours) at 17 0800  Last data filed at 17 0700   Gross per 24 hour   Intake   2775 ml   Output   3825 ml   Net  -1050 ml        Current Diet Order   Procedures   • DIET ORDER     Physical Exam   Constitutional: She appears well-developed.   HENT:   Head: Normocephalic.   Abdominal: Soft. She exhibits no distension. There is tenderness. There is no rebound and no guarding.   Wound healing well without infection     Labs:  Recent Results (from the past 24 hour(s))   CBC WITH DIFFERENTIAL    Collection Time: 17  9:05 AM   Result Value Ref Range    WBC 24.7 (H) 4.8 - 10.8 K/uL    RBC 3.49 (L) 4.20 - 5.40 M/uL    Hemoglobin 10.4 (L) 12.0 - 16.0 g/dL    Hematocrit 32.3 (L) 37.0 - 47.0 %    MCV 92.6 81.4 - 97.8 fL    MCH 29.8 27.0 - 33.0 pg    MCHC 32.2 (L) 33.6 - 35.0 g/dL    RDW 49.5 35.9 - 50.0 fL    Platelet Count 155 (L) 164 - 446 K/uL    MPV 10.7 9.0 - 12.9 fL    Nucleated RBC 0.00 /100 WBC    NRBC (Absolute) 0.00 K/uL    Neutrophils-Polys 83.30 (H) 44.00 - 72.00 %    Lymphocytes 8.80 (L) 22.00 - 41.00 %    Monocytes 7.00 0.00 - 13.40 %    Eosinophils 0.00 0.00 - 6.90 %    Basophils 0.00 0.00 - 1.80 %    Neutrophils (Absolute) 20.58 (H) 2.00 - 7.15 K/uL    Lymphs  (Absolute) 2.17 1.00 - 4.80 K/uL    Monos (Absolute) 1.73 (H) 0.00 - 0.85 K/uL    Eos (Absolute) 0.00 0.00 - 0.51 K/uL    Baso (Absolute) 0.00 0.00 - 0.12 K/uL    Anisocytosis 1+     Microcytosis 1+    COMP METABOLIC PANEL    Collection Time: 08/11/17  9:05 AM   Result Value Ref Range    Sodium 130 (L) 135 - 145 mmol/L    Potassium 4.2 3.6 - 5.5 mmol/L    Chloride 95 (L) 96 - 112 mmol/L    Co2 27 20 - 33 mmol/L    Anion Gap 8.0 0.0 - 11.9    Glucose 123 (H) 65 - 99 mg/dL    Bun 7 (L) 8 - 22 mg/dL    Creatinine 0.38 (L) 0.50 - 1.40 mg/dL    Calcium 8.7 8.5 - 10.5 mg/dL    AST(SGOT) 96 (H) 12 - 45 U/L    ALT(SGPT) 113 (H) 2 - 50 U/L    Alkaline Phosphatase 64 30 - 99 U/L    Total Bilirubin 0.7 0.1 - 1.5 mg/dL    Albumin 3.1 (L) 3.2 - 4.9 g/dL    Total Protein 5.3 (L) 6.0 - 8.2 g/dL    Globulin 2.2 1.9 - 3.5 g/dL    A-G Ratio 1.4 g/dL   ESTIMATED GFR    Collection Time: 08/11/17  9:05 AM   Result Value Ref Range    GFR If African American >60 >60 mL/min/1.73 m 2    GFR If Non African American >60 >60 mL/min/1.73 m 2   DIFFERENTIAL MANUAL    Collection Time: 08/11/17  9:05 AM   Result Value Ref Range    Myelocytes 0.90 %    Manual Diff Status PERFORMED    PERIPHERAL SMEAR REVIEW    Collection Time: 08/11/17  9:05 AM   Result Value Ref Range    Peripheral Smear Review see below    PLATELET ESTIMATE    Collection Time: 08/11/17  9:05 AM   Result Value Ref Range    Plt Estimation Decreased    MORPHOLOGY    Collection Time: 08/11/17  9:05 AM   Result Value Ref Range    RBC Morphology Present     Poikilocytosis 1+     Ovalocytes 1+    FLUID AMYLASE    Collection Time: 08/11/17  1:48 PM   Result Value Ref Range    Body Fluid Amylase 161 U/L    Fluid Type MICHELLE Drainage      Medical Decision Making, by Problem:  Active Hospital Problems    Diagnosis   • Postoperative pain [G89.18]     Priority: Medium     Baseline chronic pain on Norco  Epidural placed at the time of surgery  Patient was significant complaints of ongoing pain  despite changes in epidural regimen  8/11 epidural not working well, so was removed  Restart Lovenox in the morning  PRN IV and oral medications with Toradol: Reassess     • Anxiety [F41.9]     Priority: Medium     Premorbid  On lorazepam at home: Restarted  8/11 still anxious: Increase frequency of prn medication     • Elevated transaminase level [R74.0]     Priority: Medium     Postop elevation of transaminases  8/11 normalizing  Trend     • Malignant neoplasm of body of pancreas (CMS-HCC) [C25.1]     Invasive neoplasm at the neck of the pancreas  8/9 Extended distal pancreatectomy with splenectomy and resection of IMV  Surgical oncology: [facility]       Plan:  1.   Elevated WBC likely related to splenectomy.   Afebrile  2.   Pain control an issue.   She is on high dose narcotics with more for backup.   Use ice/heat/  3.   Added ambien for insomnia  4.   Mobilize  5.   MICHELLE out and possible discharge tomorrow.    Quality Measures:  Core Measures    Discussed patient condition with RN and Patient

## 2017-08-12 NOTE — PROGRESS NOTES
Report received, poc discussed, assumed care of pt.   Call light in reach, hourly rounding in place.   Pt gets up SBA, impulsive, BA in place.   Reg diet.  + void. LBM pta.   Oxy/toradol for pain.  L MICHELLE.  No further needs.

## 2017-08-12 NOTE — CARE PLAN
Problem: Communication  Goal: The ability to communicate needs accurately and effectively will improve  Intervention: [place] patient and significant other/support system to call light to alert staff of needs  Pt oriented to the call light and bed alarm. Pt agrees with the bed alarm being on to keep her safe if she forgets to use to the call light.      Problem: Pain Management  Goal: Pain level will decrease to patient’s comfort goal  Intervention: Follow pain managment plan developed in collaboration with patient and Interdisciplinary Team  Pt will report pain and ask for pain meds when needed. Pt agrees on alternating prn PO pain meds to provide the best pain coverage.

## 2017-08-12 NOTE — CARE PLAN
Problem: Pain Management  Goal: Pain level will decrease to patient’s comfort goal  Outcome: PROGRESSING AS EXPECTED  Pt has pain in abdomen, receiving PRN pain meds per MAR, repositioned for comfort. Non-pharmacologic options offered.    Problem: Mobility  Goal: Risk for activity intolerance will decrease  Outcome: PROGRESSING AS EXPECTED  Pt up with SBA. Ambulated in the halls x 2.

## 2017-08-12 NOTE — PROGRESS NOTES
Pt AAOx4.   C/o pain at 7/10. Medicated per MAR.   Midline abdominal incision noted with staples in place.   LLQ MICHELLE drain noted with serosanguinous drainage.   No fever, redness, or drainage noted.   LBM pta. + flatus. Voiding.   Regular diet.   x1 assist.   Reviewed POC with pt to be ambulating, up in chair for meals, and using IS.  Educated pt on the need to call for assistance.   Bed alarm in place and on.   Call light within reach.  Hourly rounding in place.  All needs met at this time.

## 2017-08-13 PROBLEM — D72.829 LEUKOCYTOSIS: Status: ACTIVE | Noted: 2017-08-13

## 2017-08-13 LAB
ALBUMIN SERPL BCP-MCNC: 2.9 G/DL (ref 3.2–4.9)
ALBUMIN/GLOB SERPL: 1.1 G/DL
ALP SERPL-CCNC: 94 U/L (ref 30–99)
ALT SERPL-CCNC: 49 U/L (ref 2–50)
AMYLASE FLD-CCNC: 115 U/L
ANION GAP SERPL CALC-SCNC: 9 MMOL/L (ref 0–11.9)
APPEARANCE FLD: NORMAL
APPEARANCE UR: CLEAR
AST SERPL-CCNC: 30 U/L (ref 12–45)
BACTERIA #/AREA URNS HPF: NEGATIVE /HPF
BILIRUB SERPL-MCNC: 0.6 MG/DL (ref 0.1–1.5)
BILIRUB UR QL STRIP.AUTO: NEGATIVE
BODY FLD TYPE: NORMAL
BODY FLD TYPE: NORMAL
BUN SERPL-MCNC: 5 MG/DL (ref 8–22)
CALCIUM SERPL-MCNC: 8.4 MG/DL (ref 8.5–10.5)
CHLORIDE SERPL-SCNC: 99 MMOL/L (ref 96–112)
CO2 SERPL-SCNC: 25 MMOL/L (ref 20–33)
COLOR FLD: NORMAL
COLOR UR: YELLOW
CREAT SERPL-MCNC: 0.39 MG/DL (ref 0.5–1.4)
CSF COMMENTS 1658: NORMAL
EPI CELLS #/AREA URNS HPF: ABNORMAL /HPF
ERYTHROCYTE [DISTWIDTH] IN BLOOD BY AUTOMATED COUNT: 49.1 FL (ref 35.9–50)
GFR SERPL CREATININE-BSD FRML MDRD: >60 ML/MIN/1.73 M 2
GLOBULIN SER CALC-MCNC: 2.6 G/DL (ref 1.9–3.5)
GLUCOSE SERPL-MCNC: 106 MG/DL (ref 65–99)
GLUCOSE UR STRIP.AUTO-MCNC: NEGATIVE MG/DL
GRAM STN SPEC: NORMAL
HCT VFR BLD AUTO: 28.5 % (ref 37–47)
HGB BLD-MCNC: 9.3 G/DL (ref 12–16)
HYALINE CASTS #/AREA URNS LPF: ABNORMAL /LPF
KETONES UR STRIP.AUTO-MCNC: 80 MG/DL
LEUKOCYTE ESTERASE UR QL STRIP.AUTO: ABNORMAL
MCH RBC QN AUTO: 30 PG (ref 27–33)
MCHC RBC AUTO-ENTMCNC: 32.6 G/DL (ref 33.6–35)
MCV RBC AUTO: 91.9 FL (ref 81.4–97.8)
MICRO URNS: ABNORMAL
NITRITE UR QL STRIP.AUTO: NEGATIVE
PH UR STRIP.AUTO: 7.5 [PH]
PLATELET # BLD AUTO: 315 K/UL (ref 164–446)
PMV BLD AUTO: 10.1 FL (ref 9–12.9)
POTASSIUM SERPL-SCNC: 3.3 MMOL/L (ref 3.6–5.5)
PROT SERPL-MCNC: 5.5 G/DL (ref 6–8.2)
PROT UR QL STRIP: NEGATIVE MG/DL
RBC # BLD AUTO: 3.1 M/UL (ref 4.2–5.4)
RBC # FLD: 3 CELLS/UL
RBC # URNS HPF: ABNORMAL /HPF
RBC UR QL AUTO: NEGATIVE
SIGNIFICANT IND 70042: NORMAL
SITE SITE: NORMAL
SODIUM SERPL-SCNC: 133 MMOL/L (ref 135–145)
SOURCE SOURCE: NORMAL
SP GR UR STRIP.AUTO: 1.01
UROBILINOGEN UR STRIP.AUTO-MCNC: 1 MG/DL
WBC # BLD AUTO: 21.1 K/UL (ref 4.8–10.8)
WBC # FLD: 20 CELLS/UL
WBC #/AREA URNS HPF: ABNORMAL /HPF

## 2017-08-13 PROCEDURE — 700111 HCHG RX REV CODE 636 W/ 250 OVERRIDE (IP): Performed by: SURGERY

## 2017-08-13 PROCEDURE — 36415 COLL VENOUS BLD VENIPUNCTURE: CPT

## 2017-08-13 PROCEDURE — 85027 COMPLETE CBC AUTOMATED: CPT

## 2017-08-13 PROCEDURE — 81001 URINALYSIS AUTO W/SCOPE: CPT

## 2017-08-13 PROCEDURE — 89051 BODY FLUID CELL COUNT: CPT

## 2017-08-13 PROCEDURE — 80053 COMPREHEN METABOLIC PANEL: CPT

## 2017-08-13 PROCEDURE — 700102 HCHG RX REV CODE 250 W/ 637 OVERRIDE(OP): Performed by: SURGERY

## 2017-08-13 PROCEDURE — A9270 NON-COVERED ITEM OR SERVICE: HCPCS | Performed by: SURGERY

## 2017-08-13 PROCEDURE — 87205 SMEAR GRAM STAIN: CPT

## 2017-08-13 PROCEDURE — 87070 CULTURE OTHR SPECIMN AEROBIC: CPT

## 2017-08-13 PROCEDURE — 770001 HCHG ROOM/CARE - MED/SURG/GYN PRIV*

## 2017-08-13 PROCEDURE — 82150 ASSAY OF AMYLASE: CPT

## 2017-08-13 RX ADMIN — POLYETHYLENE GLYCOL 3350 1 PACKET: 17 POWDER, FOR SOLUTION ORAL at 09:01

## 2017-08-13 RX ADMIN — LORAZEPAM 1 MG: 1 TABLET ORAL at 20:28

## 2017-08-13 RX ADMIN — OXYCODONE HYDROCHLORIDE 10 MG: 10 TABLET ORAL at 15:10

## 2017-08-13 RX ADMIN — MORPHINE SULFATE 15 MG: 15 TABLET, EXTENDED RELEASE ORAL at 09:01

## 2017-08-13 RX ADMIN — MORPHINE SULFATE 15 MG: 15 TABLET, EXTENDED RELEASE ORAL at 20:28

## 2017-08-13 RX ADMIN — FLUOXETINE HYDROCHLORIDE 40 MG: 20 CAPSULE ORAL at 09:01

## 2017-08-13 RX ADMIN — OXYCODONE HYDROCHLORIDE 10 MG: 10 TABLET ORAL at 02:28

## 2017-08-13 RX ADMIN — OXYCODONE HYDROCHLORIDE 10 MG: 10 TABLET ORAL at 12:03

## 2017-08-13 RX ADMIN — LORAZEPAM 1 MG: 1 TABLET ORAL at 09:01

## 2017-08-13 RX ADMIN — ENOXAPARIN SODIUM 40 MG: 100 INJECTION SUBCUTANEOUS at 12:03

## 2017-08-13 RX ADMIN — OXYCODONE HYDROCHLORIDE 10 MG: 10 TABLET ORAL at 18:13

## 2017-08-13 RX ADMIN — OXYCODONE HYDROCHLORIDE 10 MG: 10 TABLET ORAL at 09:04

## 2017-08-13 ASSESSMENT — ENCOUNTER SYMPTOMS
ABDOMINAL PAIN: 1
BACK PAIN: 0
NECK PAIN: 0
CHILLS: 0
PALPITATIONS: 0
WHEEZING: 0
WEAKNESS: 0
HEARTBURN: 0
SHORTNESS OF BREATH: 0
COUGH: 0
FEVER: 0
NAUSEA: 0
MYALGIAS: 1
NEUROLOGICAL NEGATIVE: 1

## 2017-08-13 ASSESSMENT — PAIN SCALES - GENERAL
PAINLEVEL_OUTOF10: 8
PAINLEVEL_OUTOF10: 9
PAINLEVEL_OUTOF10: 5
PAINLEVEL_OUTOF10: 8
PAINLEVEL_OUTOF10: 8

## 2017-08-13 NOTE — PROGRESS NOTES
Received report from NOC Shift. Pt AOx4, up with 1 assist and FWW, pt does have frequency and urgency with voiding however this shift she has called appropriately to get up to BR. Pt reports severe pain, pt has chronic pain and is on scheduled and PRN Pain medications. VSS on 1L O2 attempted to wean unsuccessful. Pt was anxious for DC home today. However per MD orders received for UA and Amylase of MICHELLE drain due to increased output. Fluids sent down to lab.  Pt tolerating regular diet, denies N/V but reports decreased appetite

## 2017-08-13 NOTE — PROGRESS NOTES
"  Trauma/Surgical Progress Note    Author: Fuentes Love Date & Time created: 8/13/2017   11:12 AM     Interval Events:  No acute changes  Elvia PO  Up to chair  afeb  Urinary urgency and some dysuria: UA sent     Review of Systems   Constitutional: Negative for fever, chills and malaise/fatigue.   Respiratory: Negative for cough, shortness of breath and wheezing.    Cardiovascular: Negative for chest pain, palpitations and leg swelling.   Gastrointestinal: Positive for abdominal pain. Negative for heartburn and nausea.   Musculoskeletal: Positive for myalgias. Negative for back pain, joint pain and neck pain.   Neurological: Negative.  Negative for weakness.     Hemodynamics:  Blood pressure 145/81, pulse 87, temperature 36.6 °C (97.8 °F), resp. rate 16, height 1.651 m (5' 5\"), weight 52.3 kg (115 lb 4.8 oz), SpO2 92 %.     Respiratory:    Respiration: 16, Pulse Oximetry: 92 %           Fluids:    Intake/Output Summary (Last 24 hours) at 08/13/17 1112  Last data filed at 08/13/17 0730   Gross per 24 hour   Intake   2030 ml   Output    485 ml   Net   1545 ml     Admit Weight: 60.1 kg (132 lb 7.9 oz)  Current      Physical Exam   Constitutional: She is oriented to person, place, and time. She is active and cooperative. No distress. Nasal cannula in place.   HENT:   Mouth/Throat: Oropharynx is clear and moist.   Eyes: No scleral icterus.   Cardiovascular: Normal rate, regular rhythm and normal pulses.    Pulmonary/Chest: Effort normal. No respiratory distress. She has no decreased breath sounds.   Abdominal: Soft. She exhibits no distension. There is tenderness. There is no rebound.   MICHELLE clear  Incision CDI   Musculoskeletal: She exhibits no edema.   Neurological: She is alert and oriented to person, place, and time.   Skin: Skin is warm and dry.       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  8/13 MICHELLE drainage high: check amylase, leave drain in place  Tolerating diet  LFTs normal  Surgical oncology: [facility]     • Leukocytosis [D72.829]     Priority: Medium     Afebrile  Abd exam not concerning\  Likely reactive after splenectomy     • Postoperative pain [G89.18]     Priority: Medium     Baseline chronic pain on Norco  Epidural placed at the time of surgery  Patient was significant complaints of ongoing pain despite changes in epidural regimen  8/11 epidural not working well, so was removed  8/13 PRN IV and oral medications with Toradol     • Anxiety [F41.9]     Priority: Medium     Premorbid  On lorazepam at home: Restarted  8/13 Continue lorazepam     • Elevated transaminase level [R74.0]     Priority: Low     Postop elevation of transaminases  8/13 normalized         Core Measures & Quality Metrics:  Labs reviewed, Medications reviewed and Radiology images reviewed        DVT Prophylaxis: Enoxaparin (Lovenox)  DVT prophylaxis - mechanical: SCDs  Ulcer prophylaxis: Yes    Assessed for rehab: Patient was assess for and/or received rehabilitation services during this hospitalization    ELLEN Score  Discussed patient condition with RN and Patient.

## 2017-08-14 LAB
ALBUMIN SERPL BCP-MCNC: 2.7 G/DL (ref 3.2–4.9)
ALBUMIN/GLOB SERPL: 1.1 G/DL
ALP SERPL-CCNC: 270 U/L (ref 30–99)
ALT SERPL-CCNC: 76 U/L (ref 2–50)
ANION GAP SERPL CALC-SCNC: 8 MMOL/L (ref 0–11.9)
AST SERPL-CCNC: 149 U/L (ref 12–45)
BILIRUB SERPL-MCNC: 1.2 MG/DL (ref 0.1–1.5)
BUN SERPL-MCNC: 6 MG/DL (ref 8–22)
CALCIUM SERPL-MCNC: 8 MG/DL (ref 8.5–10.5)
CHLORIDE SERPL-SCNC: 96 MMOL/L (ref 96–112)
CO2 SERPL-SCNC: 29 MMOL/L (ref 20–33)
CREAT SERPL-MCNC: 0.42 MG/DL (ref 0.5–1.4)
ERYTHROCYTE [DISTWIDTH] IN BLOOD BY AUTOMATED COUNT: 48.7 FL (ref 35.9–50)
GFR SERPL CREATININE-BSD FRML MDRD: >60 ML/MIN/1.73 M 2
GLOBULIN SER CALC-MCNC: 2.5 G/DL (ref 1.9–3.5)
GLUCOSE BLD-MCNC: 127 MG/DL (ref 65–99)
GLUCOSE SERPL-MCNC: 128 MG/DL (ref 65–99)
HCT VFR BLD AUTO: 29.6 % (ref 37–47)
HGB BLD-MCNC: 9.9 G/DL (ref 12–16)
MCH RBC QN AUTO: 30.6 PG (ref 27–33)
MCHC RBC AUTO-ENTMCNC: 33.4 G/DL (ref 33.6–35)
MCV RBC AUTO: 91.4 FL (ref 81.4–97.8)
PLATELET # BLD AUTO: 365 K/UL (ref 164–446)
PMV BLD AUTO: 10.5 FL (ref 9–12.9)
POTASSIUM SERPL-SCNC: 3.1 MMOL/L (ref 3.6–5.5)
PROT SERPL-MCNC: 5.2 G/DL (ref 6–8.2)
RBC # BLD AUTO: 3.24 M/UL (ref 4.2–5.4)
SODIUM SERPL-SCNC: 133 MMOL/L (ref 135–145)
WBC # BLD AUTO: 19 K/UL (ref 4.8–10.8)

## 2017-08-14 PROCEDURE — A9270 NON-COVERED ITEM OR SERVICE: HCPCS | Performed by: SURGERY

## 2017-08-14 PROCEDURE — 770001 HCHG ROOM/CARE - MED/SURG/GYN PRIV*

## 2017-08-14 PROCEDURE — 700102 HCHG RX REV CODE 250 W/ 637 OVERRIDE(OP): Performed by: SURGERY

## 2017-08-14 PROCEDURE — 82962 GLUCOSE BLOOD TEST: CPT

## 2017-08-14 PROCEDURE — 36415 COLL VENOUS BLD VENIPUNCTURE: CPT

## 2017-08-14 PROCEDURE — 80053 COMPREHEN METABOLIC PANEL: CPT

## 2017-08-14 PROCEDURE — 85027 COMPLETE CBC AUTOMATED: CPT

## 2017-08-14 PROCEDURE — 700111 HCHG RX REV CODE 636 W/ 250 OVERRIDE (IP): Performed by: SURGERY

## 2017-08-14 RX ORDER — FUROSEMIDE 10 MG/ML
40 INJECTION INTRAMUSCULAR; INTRAVENOUS ONCE
Status: COMPLETED | OUTPATIENT
Start: 2017-08-14 | End: 2017-08-14

## 2017-08-14 RX ORDER — ENEMA 19; 7 G/133ML; G/133ML
1 ENEMA RECTAL ONCE
Status: ACTIVE | OUTPATIENT
Start: 2017-08-14 | End: 2017-08-15

## 2017-08-14 RX ADMIN — OXYCODONE HYDROCHLORIDE 10 MG: 10 TABLET ORAL at 19:31

## 2017-08-14 RX ADMIN — OXYCODONE HYDROCHLORIDE 10 MG: 10 TABLET ORAL at 00:58

## 2017-08-14 RX ADMIN — POLYETHYLENE GLYCOL 3350 1 PACKET: 17 POWDER, FOR SOLUTION ORAL at 08:52

## 2017-08-14 RX ADMIN — ENOXAPARIN SODIUM 40 MG: 100 INJECTION SUBCUTANEOUS at 12:22

## 2017-08-14 RX ADMIN — MORPHINE SULFATE 15 MG: 15 TABLET, EXTENDED RELEASE ORAL at 08:52

## 2017-08-14 RX ADMIN — LORAZEPAM 1 MG: 1 TABLET ORAL at 08:52

## 2017-08-14 RX ADMIN — OXYCODONE HYDROCHLORIDE 10 MG: 10 TABLET ORAL at 06:51

## 2017-08-14 RX ADMIN — FUROSEMIDE 40 MG: 10 INJECTION, SOLUTION INTRAMUSCULAR; INTRAVENOUS at 08:53

## 2017-08-14 RX ADMIN — OXYCODONE HYDROCHLORIDE 10 MG: 10 TABLET ORAL at 10:50

## 2017-08-14 RX ADMIN — LORAZEPAM 1 MG: 1 TABLET ORAL at 20:57

## 2017-08-14 RX ADMIN — OXYCODONE HYDROCHLORIDE 10 MG: 10 TABLET ORAL at 16:23

## 2017-08-14 RX ADMIN — FLUOXETINE HYDROCHLORIDE 40 MG: 20 CAPSULE ORAL at 08:52

## 2017-08-14 RX ADMIN — MORPHINE SULFATE 15 MG: 15 TABLET, EXTENDED RELEASE ORAL at 20:57

## 2017-08-14 ASSESSMENT — PAIN SCALES - GENERAL
PAINLEVEL_OUTOF10: 6
PAINLEVEL_OUTOF10: 7
PAINLEVEL_OUTOF10: ASSUMED PAIN PRESENT
PAINLEVEL_OUTOF10: 6
PAINLEVEL_OUTOF10: 8

## 2017-08-14 ASSESSMENT — ENCOUNTER SYMPTOMS
ABDOMINAL PAIN: 1
NERVOUS/ANXIOUS: 1

## 2017-08-14 NOTE — PROGRESS NOTES
Assumed care of pt at 0700. Pt up to bathroom with standby assist. [place] drain removed per active order, pt tolerated well, gauze and tegaderm placed. Pt oriented x4, Pt rates pain 6 out of 10 in abdomen, repositioned, rest encouraged. Pt - N/V. + BS, + flatus, - BM. Midline abdominal incision with staples, ANA CRISTIAN. Pt up with standby assist. Labs noted, assessment complete. Pt updated on POC. Pt educated to use call light when in need of assisstance. Bed locked and in lowest position. All needs met at this time, call light within reach, will continue to monitor.

## 2017-08-14 NOTE — PROGRESS NOTES
Spoke to Dr. Lancaster regarding DME order. He would mlike to keep pt an additional day and will place order tomorrow.

## 2017-08-14 NOTE — PROGRESS NOTES
Dr. Lancaster notified of morning lab results. Updated regarding serosanguinous/purlent output from MICHELLE drain. Dr. Lancaster will review new lab results, acknowledges output from MICHELLE drain.

## 2017-08-14 NOTE — DISCHARGE PLANNING
[facility] received received a DME choice form. DME order is needed before the referral can be sent. SW on floor Mary is aware.

## 2017-08-14 NOTE — PROGRESS NOTES
Surgical Progress Note    Author: Gera Lancaster Date & Time created: 2017   12:29 PM     Interval Events:  POD 5  Very anxious  Pain better controlled  On general diet and po pain meds  Sat Poor on RA   Review of Systems   Gastrointestinal: Positive for abdominal pain.   Psychiatric/Behavioral: The patient is nervous/anxious.    All other systems reviewed and are negative.    Hemodynamics:  Temp (24hrs), Av °C (98.6 °F), Min:36.4 °C (97.6 °F), Max:37.8 °C (100.1 °F)  Temperature: 36.4 °C (97.6 °F)  Pulse  Av.4  Min: 54  Max: 101   Blood Pressure: 123/75 mmHg     Respiratory:    Respiration: 16, Pulse Oximetry: (!) 80 %        RUL Breath Sounds: Clear, RML Breath Sounds: Clear, RLL Breath Sounds: Clear, CURTIS Breath Sounds: Clear, LLL Breath Sounds: Clear  Neuro:  GCS       Fluids:    Intake/Output Summary (Last 24 hours) at 17 1229  Last data filed at 17 1200   Gross per 24 hour   Intake    520 ml   Output    580 ml   Net    -60 ml        Current Diet Order   Procedures   • DIET ORDER     Physical Exam   Constitutional: She is oriented to person, place, and time. She appears well-developed and well-nourished.   HENT:   Head: Normocephalic and atraumatic.   Eyes: Conjunctivae are normal. Pupils are equal, round, and reactive to light.   Neck: Normal range of motion. Neck supple.   Cardiovascular: Normal rate and regular rhythm.    Pulmonary/Chest: Effort normal and breath sounds normal.   Abdominal: Soft. Bowel sounds are normal.   Anxiety and difficulty with pain management   Musculoskeletal: Normal range of motion.   Neurological: She is alert and oriented to person, place, and time.   Skin: Skin is warm and dry.   Psychiatric: She has a normal mood and affect. Her behavior is normal. Judgment and thought content normal.   Nursing note and vitals reviewed.    Labs:  Recent Results (from the past 24 hour(s))   CBC WITHOUT DIFFERENTIAL    Collection Time: 17  2:25 AM   Result  Value Ref Range    WBC 19.0 (H) 4.8 - 10.8 K/uL    RBC 3.24 (L) 4.20 - 5.40 M/uL    Hemoglobin 9.9 (L) 12.0 - 16.0 g/dL    Hematocrit 29.6 (L) 37.0 - 47.0 %    MCV 91.4 81.4 - 97.8 fL    MCH 30.6 27.0 - 33.0 pg    MCHC 33.4 (L) 33.6 - 35.0 g/dL    RDW 48.7 35.9 - 50.0 fL    Platelet Count 365 164 - 446 K/uL    MPV 10.5 9.0 - 12.9 fL   COMP METABOLIC PANEL    Collection Time: 08/14/17  2:25 AM   Result Value Ref Range    Sodium 133 (L) 135 - 145 mmol/L    Potassium 3.1 (L) 3.6 - 5.5 mmol/L    Chloride 96 96 - 112 mmol/L    Co2 29 20 - 33 mmol/L    Anion Gap 8.0 0.0 - 11.9    Glucose 128 (H) 65 - 99 mg/dL    Bun 6 (L) 8 - 22 mg/dL    Creatinine 0.42 (L) 0.50 - 1.40 mg/dL    Calcium 8.0 (L) 8.5 - 10.5 mg/dL    AST(SGOT) 149 (H) 12 - 45 U/L    ALT(SGPT) 76 (H) 2 - 50 U/L    Alkaline Phosphatase 270 (H) 30 - 99 U/L    Total Bilirubin 1.2 0.1 - 1.5 mg/dL    Albumin 2.7 (L) 3.2 - 4.9 g/dL    Total Protein 5.2 (L) 6.0 - 8.2 g/dL    Globulin 2.5 1.9 - 3.5 g/dL    A-G Ratio 1.1 g/dL   ESTIMATED GFR    Collection Time: 08/14/17  2:25 AM   Result Value Ref Range    GFR If African American >60 >60 mL/min/1.73 m 2    GFR If Non African American >60 >60 mL/min/1.73 m 2   ACCU-CHEK GLUCOSE    Collection Time: 08/14/17  3:51 AM   Result Value Ref Range    Glucose - Accu-Ck 127 (H) 65 - 99 mg/dL     Medical Decision Making, by Problem:  Active Hospital Problems    Diagnosis   • Malignant neoplasm of body of pancreas (CMS-HCC) [C25.1]     Priority: High     Invasive neoplasm at the neck of the pancreas  8/9 Extended distal pancreatectomy with splenectomy and resection of IMV  8/13 MICHELLE drainage high: check amylase, leave drain in place  Tolerating diet  LFTs normal  Surgical oncology: Gallanopoulos     • Leukocytosis [D72.829]     Priority: Medium     Afebrile  Abd exam not concerning\  Likely reactive after splenectomy     • Postoperative pain [G89.18]     Priority: Medium     Baseline chronic pain on Norco  Epidural placed at the time  of surgery  Patient was significant complaints of ongoing pain despite changes in epidural regimen  8/11 epidural not working well, so was removed  8/13 PRN IV and oral medications with Toradol     • Anxiety [F41.9]     Priority: Medium     Premorbid  On lorazepam at home: Restarted  8/13 Continue lorazepam     • Elevated transaminase level [R74.0]     Priority: Low     Postop elevation of transaminases  8/13 normalized       Plan:  Set up home O2  DC MICHELLE-amylase normal  Ambulate  Fleets  Plan DC in AM    Quality Measures:  Medications reviewed and Labs reviewed  Owens catheter: No Owens      DVT Prophylaxis: Enoxaparin (Lovenox)  DVT prophylaxis - mechanical: SCDs  Ulcer prophylaxis: Yes    Assessed for rehab: Patient returned to prior level of function, rehabilitation not indicated at this time      Discussed patient condition with Family and RN

## 2017-08-14 NOTE — DISCHARGE PLANNING
Referral: O2    Intervention: Received order for O2.  SW met with pt at bedside, pt's choice is [facility], referral sent.    Plan: Home with O2.

## 2017-08-14 NOTE — FACE TO FACE
Face to Face Note  -  Durable Medical Equipment    Gera Lancaster M.D. - NPI: [id number]  I certify that this patient is under my care and that they have had a durable medical equipment(DME)face to face encounter by myself that meets the physician DME face-to-face encounter requirements with this patient on:    Date of encounter:   Patient:                    MRN:                       YOB: 2019  Frieda Huizar  [id number]  1960     The encounter with the patient was in whole, or in part, for the following medical condition, which is the primary reason for durable medical equipment:  Post-Op Surgery    I certify that, based on my findings, the following durable medical equipment is medically necessary:  Oxygen.    HOME O2 Saturation Measurements:(Values must be present for Home Oxygen orders)         ,     ,         My Clinical findings support the need for the above equipment due to:  Hypoxia    Supporting Symptoms: RA sat less than 90%     ------------------------------------------------------------------------------------------------------------------    Face to Face Supporting Documentation - Home Health    The encounter with this patient was in whole or in part the primary reason for home health admission.    Date of encounter:   Patient:                    MRN:                       YOB: 2019  Frieda Huizar  [id number]  1960     Home health to see patient for:  Speech Language Pathology evaluation and treatment    Skilled need for:  Surgical Aftercare home oxygen    Skilled nursing interventions to include:  Comment: oxygen    Homebound evidenced status by:  Needs the assistance of another person in order to leave the home. Leaving home must require a considerable and taxing effort. There must exist a normal inability to leave the home.    Community Physician to provide follow up care: Alton Layton M.D.     Optional Interventions    Wound  information & treatment:    Home Infusion Therapy orders:    Line/Drain/Airway:    I certify the face to face encounter for this home care referral meets the CMS requirements and the encounter/clinical assessment with the patient was, in whole, or in part, for the medical condition(s) listed above, which is the primary reason for home health care. Based on my clinical findings: the service(s) are medically necessary, support the need for home health care, and the homebound criteria are met.  I certify that this patient has had a face to face encounter by myself.  Gera Lancastre M.D. - NPI: [id number]    *Debility, frailty and advanced age in the absence of an acute deterioration or exacerbation of a condition do not qualify a patient for home health.

## 2017-08-14 NOTE — DIETARY
Nutrition Services: Update  Pt is on a regular diet. Pt states her appetite is good and states she doesn't normally eat a lot. Family agrees that she doesn't normally eat a lot. Pt states she is eating about 50% of her meals. Pt declined snacks. Family states she enjoys vanilla milkshakes, added with lunch and dinner. Per chart pt PO 50-75% most meals.  Wt on 8/11 - 52.3 kg via bed scale, question accuracy of wts admit wt on 8/8 was 60.1 kg via stand up scale.     Pertinent Labs: Na 133, K 3.1, Glucose 128, BUN 6, Creatinine 0.42, POC glucose 127  Pertinent Meds: lovenox, fleet enema, prozac, ativan, ms contin, miralax, roxicodone (prn)  Fluids: No IV fluids noted in MAR  Skin: surgical incision N/A abdomen  GI: Last BM 8/14    PLAN/RECOMMEND -   1) Nutrition rep to see patient daily for meal and snack preferences.  2) Encourage PO  3) Weekly weights to monitor fluid and nutrition status  4) Please document PO intake for each meal as percentage of meals consumed    RD available prn

## 2017-08-14 NOTE — CARE PLAN
Problem: Nutritional:  Goal: Achieve adequate nutritional intake  Patient will tolerated PO diet and consume 50% of meals   Outcome: MET Date Met:  08/14/17  Per chart pt PO 50-75% most meals

## 2017-08-15 VITALS
RESPIRATION RATE: 18 BRPM | WEIGHT: 115.3 LBS | BODY MASS INDEX: 19.21 KG/M2 | TEMPERATURE: 97.7 F | HEIGHT: 65 IN | DIASTOLIC BLOOD PRESSURE: 74 MMHG | SYSTOLIC BLOOD PRESSURE: 116 MMHG | HEART RATE: 86 BPM | OXYGEN SATURATION: 91 %

## 2017-08-15 PROBLEM — C25.1 MALIGNANT NEOPLASM OF BODY OF PANCREAS (HCC): Status: RESOLVED | Noted: 2017-08-09 | Resolved: 2017-08-15

## 2017-08-15 LAB
ALBUMIN SERPL BCP-MCNC: 3 G/DL (ref 3.2–4.9)
ALBUMIN/GLOB SERPL: 1 G/DL
ALP SERPL-CCNC: 208 U/L (ref 30–99)
ALT SERPL-CCNC: 47 U/L (ref 2–50)
ANION GAP SERPL CALC-SCNC: 12 MMOL/L (ref 0–11.9)
AST SERPL-CCNC: 26 U/L (ref 12–45)
BILIRUB SERPL-MCNC: 0.7 MG/DL (ref 0.1–1.5)
BUN SERPL-MCNC: 5 MG/DL (ref 8–22)
CALCIUM SERPL-MCNC: 8.5 MG/DL (ref 8.5–10.5)
CHLORIDE SERPL-SCNC: 94 MMOL/L (ref 96–112)
CO2 SERPL-SCNC: 29 MMOL/L (ref 20–33)
CREAT SERPL-MCNC: 0.46 MG/DL (ref 0.5–1.4)
ERYTHROCYTE [DISTWIDTH] IN BLOOD BY AUTOMATED COUNT: 47.3 FL (ref 35.9–50)
GFR SERPL CREATININE-BSD FRML MDRD: >60 ML/MIN/1.73 M 2
GLOBULIN SER CALC-MCNC: 2.9 G/DL (ref 1.9–3.5)
GLUCOSE SERPL-MCNC: 153 MG/DL (ref 65–99)
HCT VFR BLD AUTO: 32.6 % (ref 37–47)
HGB BLD-MCNC: 10.7 G/DL (ref 12–16)
MCH RBC QN AUTO: 29.8 PG (ref 27–33)
MCHC RBC AUTO-ENTMCNC: 32.8 G/DL (ref 33.6–35)
MCV RBC AUTO: 90.8 FL (ref 81.4–97.8)
PLATELET # BLD AUTO: 504 K/UL (ref 164–446)
PMV BLD AUTO: 10.3 FL (ref 9–12.9)
POTASSIUM SERPL-SCNC: 2.7 MMOL/L (ref 3.6–5.5)
PROT SERPL-MCNC: 5.9 G/DL (ref 6–8.2)
RBC # BLD AUTO: 3.59 M/UL (ref 4.2–5.4)
SODIUM SERPL-SCNC: 135 MMOL/L (ref 135–145)
WBC # BLD AUTO: 17.3 K/UL (ref 4.8–10.8)

## 2017-08-15 PROCEDURE — 85027 COMPLETE CBC AUTOMATED: CPT

## 2017-08-15 PROCEDURE — 36415 COLL VENOUS BLD VENIPUNCTURE: CPT

## 2017-08-15 PROCEDURE — 700102 HCHG RX REV CODE 250 W/ 637 OVERRIDE(OP): Performed by: SURGERY

## 2017-08-15 PROCEDURE — A9270 NON-COVERED ITEM OR SERVICE: HCPCS | Performed by: SURGERY

## 2017-08-15 PROCEDURE — 80053 COMPREHEN METABOLIC PANEL: CPT

## 2017-08-15 RX ORDER — POTASSIUM CHLORIDE 20 MEQ/1
40 TABLET, EXTENDED RELEASE ORAL DAILY
Status: DISCONTINUED | OUTPATIENT
Start: 2017-08-15 | End: 2017-08-15 | Stop reason: HOSPADM

## 2017-08-15 RX ORDER — KETOROLAC TROMETHAMINE 10 MG/1
10 TABLET, FILM COATED ORAL EVERY 6 HOURS PRN
Qty: 30 TAB | Refills: 1 | Status: SHIPPED | OUTPATIENT
Start: 2017-08-15

## 2017-08-15 RX ORDER — OXYCODONE HYDROCHLORIDE 10 MG/1
10 TABLET ORAL
Qty: 40 TAB | Refills: 0 | Status: SHIPPED | OUTPATIENT
Start: 2017-08-15

## 2017-08-15 RX ADMIN — POLYETHYLENE GLYCOL 3350 1 PACKET: 17 POWDER, FOR SOLUTION ORAL at 07:36

## 2017-08-15 RX ADMIN — FLUOXETINE HYDROCHLORIDE 40 MG: 20 CAPSULE ORAL at 07:36

## 2017-08-15 RX ADMIN — POTASSIUM CHLORIDE 40 MEQ: 1500 TABLET, EXTENDED RELEASE ORAL at 12:59

## 2017-08-15 RX ADMIN — LORAZEPAM 1 MG: 1 TABLET ORAL at 07:36

## 2017-08-15 RX ADMIN — MORPHINE SULFATE 15 MG: 15 TABLET, EXTENDED RELEASE ORAL at 07:36

## 2017-08-15 RX ADMIN — OXYCODONE HYDROCHLORIDE 10 MG: 10 TABLET ORAL at 06:07

## 2017-08-15 RX ADMIN — OXYCODONE HYDROCHLORIDE 10 MG: 10 TABLET ORAL at 03:04

## 2017-08-15 RX ADMIN — OXYCODONE HYDROCHLORIDE 10 MG: 10 TABLET ORAL at 13:00

## 2017-08-15 ASSESSMENT — PAIN SCALES - GENERAL
PAINLEVEL_OUTOF10: ASSUMED PAIN PRESENT
PAINLEVEL_OUTOF10: 6
PAINLEVEL_OUTOF10: 7
PAINLEVEL_OUTOF10: ASSUMED PAIN PRESENT
PAINLEVEL_OUTOF10: 4

## 2017-08-15 ASSESSMENT — LIFESTYLE VARIABLES: DO YOU DRINK ALCOHOL: NO

## 2017-08-15 NOTE — PROGRESS NOTES
Received report from NOC RN. Assumed care of pt at 0715. Pt AOx4, up with SBA, calls appropriately, VSS on O2, attempted to wean pt does compensate at times and other times pt is found to be in the low to mid 80s. O2 order placed by MD. O2 sats for home will be redone this shift. Pain controlled with scheduled and PRN PO pain medications.

## 2017-08-15 NOTE — DISCHARGE INSTRUCTIONS
Discharge Instructions    Discharged to home by car with relative. Discharged via wheelchair, hospital escort: Yes.  Special equipment needed: Not Applicable    Be sure to schedule a follow-up appointment with your primary care doctor or any specialists as instructed.     Discharge Plan:   Diet Plan: Discussed  Activity Level: Discussed  Confirmed Follow up Appointment: Patient to Call and Schedule Appointment  Confirmed Symptoms Management: Discussed  Medication Reconciliation Updated: Yes  Pneumococcal Vaccine Given - only chart on this line when given: Given (See MAR)  Influenza Vaccine Indication: Not indicated: Previously immunized this influenza season and > 8 years of age    I understand that a diet low in cholesterol, fat, and sodium is recommended for good health. Unless I have been given specific instructions below for another diet, I accept this instruction as my diet prescription.   Other diet: As Tolerated    Special Instructions:   OK to shower  Follow up with office MA on 8/21/17 for staple removal  Follow up with Dr. Lancaster in 2 weeks (8/29/17)    · Is patient discharged on Warfarin / Coumadin?   No     · Is patient Post Blood Transfusion?  No    Depression / Suicide Risk    As you are discharged from this [facility] facility, it is important to learn how to keep safe from harming yourself.    Recognize the warning signs:  · Abrupt changes in personality, positive or negative- including increase in energy   · Giving away possessions  · Change in eating patterns- significant weight changes-  positive or negative  · Change in sleeping patterns- unable to sleep or sleeping all the time   · Unwillingness or inability to communicate  · Depression  · Unusual sadness, discouragement and loneliness  · Talk of wanting to die  · Neglect of personal appearance   · Rebelliousness- reckless behavior  · Withdrawal from people/activities they love  · Confusion- inability to concentrate     If you or a  loved one observes any of these behaviors or has concerns about self-harm, here's what you can do:  · Talk about it- your feelings and reasons for harming yourself  · Remove any means that you might use to hurt yourself (examples: pills, rope, extension cords, firearm)  · Get professional help from the community (Mental Health, Substance Abuse, psychological counseling)  · Do not be alone:Call your Safe Contact- someone whom you trust who will be there for you.  · Call your local CRISIS HOTLINE 712-3395 or 908-603-7474  · Call your local Children's Mobile Crisis Response Team [place] (980) 242-5356 or www.[place]  · Call the toll free National Suicide Prevention Hotlines   · National Suicide Prevention Lifeline 353-908-ADTA (9962)  · Coub Line Network 800-SUICIDE (322-1363)    Pancreatic Cancer   Pancreatic cancer is an abnormal growth of tissue (tumor) in the pancreas that is cancerous (malignant). Unlike noncancerous (benign) tumors, malignant tumors can spread to other parts of your body. The pancreas is a gland located deep in the abdomen, between the stomach and the spine. The pancreas makes insulin and other hormones. These hormones help the body use or store the energy that comes from food. The pancreas also makes pancreatic juices. These juices contain enzymes that help digest food.  Most pancreatic cancers begin in the ducts that carry pancreatic juices. When cancer of the pancreas spreads (metastasizes) outside the pancreas, cancer cells are often found in nearby lymph nodes. If the cancer has reached these nodes, it means that cancer cells may have spread to other lymph nodes or other tissues, such as the liver or lungs. Sometimes cancer of the pancreas spreads to the peritoneum. This is the tissue that lines the abdomen.  CAUSES   The exact cause of pancreatic cancer is unknown.   RISK FACTORS  There are a number of risk factors that can increase your chances of getting pancreatic  "cancer. They include:  · Age. The likelihood of developing pancreatic cancer increases with age. Most pancreatic cancers occur in people older than 60 years.  · Smoking. Cigarette smokers are 2 to 3 times more likely than nonsmokers to develop pancreatic cancer.  · Diabetes, especially if you were diagnosed as an adult.  · Being male.  · Being .  · Family history. The risk for developing pancreatic cancer triples if a person's mother, father, sister, or brother had the disease. Also, a family history of colon cancer or ovarian cancer increases the risk of pancreatic cancer.  · Chronic pancreatitis. Chronic pancreatitis is a painful condition of the pancreas.  · Exposure to certain chemicals in the workplace.  · Being obese or eating a diet high in fat and red meat.  SYMPTOMS   Pancreatic cancer is sometimes called a \"silent disease.\" This is because early pancreatic cancer often does not cause symptoms. As the cancer grows, symptoms may include:  · Weakness.  · Abdominal pain.  · Diarrhea.  · Depression.  · Loss of appetite.  · Indigestion.  · Pain in the upper abdomen or upper back.  · Nausea and vomiting.  · Yellowing of the skin or eyes (jaundice).  · Back pain.  · Weight loss.  · Fatigue.  · Black-colored stools.  · Unexplained blood clots.  · Dark urine.  DIAGNOSIS   Your caregiver will ask about your medical history. He or she may also perform a number of procedures, such as:  · A physical exam. Your skin and eyes will be examined for signs of jaundice. The abdomen will be checked for changes in the area near the pancreas, liver, and gallbladder. Your caregiver will also check for an abnormal buildup of fluid in the abdomen (ascites).  · Lab tests. Your caregiver may take blood and urine samples to check for bilirubin and other substances. Bilirubin is a substance that passes from the liver to the intestine through the gallbladder and bile duct. If the bile duct is blocked by a tumor, the " bilirubin cannot pass through normally. Blockage of the bile duct may cause the level of bilirubin in the blood and urine to become very high.  · Computed tomography (CT). An X-ray machine linked to a computer takes a series of detailed pictures of the pancreas and other organs and blood vessels in the abdomen.  · Ultrasonography. The ultrasound device uses sound waves to produce pictures of the pancreas and other organs inside the abdomen.  · Endoscopic retrograde cholangiopancreatography. A lighted tube (endoscope) is passed through the patient's mouth and stomach, down into the small intestine. Then a smaller tube (catheter) is inserted through the endoscope into the bile ducts and pancreatic ducts. A dye is injected through the catheter into the ducts and X-rays are taken. The X-rays can show whether the ducts are narrowed or blocked by a tumor.  · Endoscopic ultrasonography. An endoscope is passed through the patient's mouth and stomach, down into the small intestine. An ultrasound device is placed down the endoscope to produce pictures of the area, including the pancreas.  · Percutaneous transhepatic cholangiography. A dye is injected through a thin needle into the liver and X-rays are taken. Unless there is a blockage, the dye should move freely through the bile ducts. From the X-rays, your caregiver can tell whether there is a blockage from a tumor.  · Taking a tissue sample (biopsy) from the pancreas. The sample is examined under a microscope to look for cancer cells.  Your cancer will be staged according to its severity and extent. Staging is a careful attempt to categorize your cancer to help determine which treatment will be most appropriate. Factors involved in staging include the size of the tumor, whether the cancer has spread, and if so, to what parts of the body it has spread. You may need to have more tests to determine the stage of your cancer. The test results will help determine what treatment  plan is best for you.  STAGES   · Stage I. The cancer is only found in the pancreas.  · Stage II. The cancer has spread to nearby tissues and possibly to the lymph nodes, but not to the blood vessels.  · Stage III. The cancer is surrounding the major blood vessels beside the pancreas and has possibly spread to the lymph nodes.  · Stage IV. The cancer has spread to other parts of the body, such as the liver, lungs, or peritoneum.  TREATMENT   Treatment generally begins within several weeks after the diagnosis. There will be time to talk with your caregiver about treatment choices, get a second opinion, and learn more about the disease. Your caregiver may refer you to a cancer specialist (oncologist).  · Cancer of the pancreas is very hard to control with current treatments. For that reason, many caregivers encourage patients with this disease to consider taking part in a clinical trial. Clinical trials are an important option for people with all stages of pancreatic cancer.  · At this time, pancreatic cancer can be cured only when it is found at an early stage, before it has spread. However, other treatments may be able to control the disease and help patients live longer and feel better. When a cure or control of the disease is not possible, some patients choose palliative therapy. Palliative therapy aims to improve quality of life by controlling pain and other problems caused by this disease, but it does not cure the disease.  Depending on the type and stage, pancreatic cancer may be treated with surgery, radiation therapy, or chemotherapy. Some patients have a combination of these therapies.  · Surgery may be done to remove all or part of the pancreas. Sometimes the cancer cannot be completely removed. However, if the tumor is blocking the common bile duct or duodenum, the surgeon can place a mesh tube (stent) in the blocked area. This helps keep the duct or duodenum open.  · Radiation therapy uses high-energy  rays to kill cancer cells.  · Chemotherapy is the use of drugs to kill cancer cells. Caregivers also give chemotherapy to help reduce pain and other problems caused by pancreatic cancer.  HOME CARE INSTRUCTIONS   · Only take over-the-counter or prescription medicines for pain, discomfort, or fever as directed by your caregiver.  · Maintain a healthy diet.  · Consider joining a support group. This may help you learn to cope with the stress of having pancreatic cancer.  · Seek advice to help you manage treatment side effects.  · Keep all follow-up appointments as directed by your caregiver.  SEEK IMMEDIATE MEDICAL CARE IF:   · You have a sudden increase in pain.  · Your skin or eyes turn more yellow.  · You lose weight without trying.  · You have a fever, especially during chemotherapy treatment or after stent placement.  · You notice new fatigue or weakness.     This information is not intended to replace advice given to you by your health care provider. Make sure you discuss any questions you have with your health care provider.     Whipple Procedure, Care After  Refer to this sheet in the next few weeks. These instructions provide you with information on caring for yourself after your procedure. Your caregiver may also give you more specific instructions. Your treatment has been planned according to current medical practices, but problems sometimes occur. Call your caregiver if you have any problems or questions after your procedure.  HOME CARE INSTRUCTIONS  Medicines  · Take pain medicine as prescribed by your caregiver. Do not take any over-the-counter pain medicines unless your caregiver says it is okay. Some pain medicines can cause bleeding for several weeks after surgery.  · Constipation is common after this procedure. You may need to take medicine to prevent this.  · Some people have trouble digesting food after a Whipple procedure. Your caregiver may give you medicine to help with digestion.  Wound  care  · You may have drainage tubes still in place when you go home. These tubes need to stay in place until no more fluid is draining from your body. Your caregiver will explain what you need to do. Follow the instructions carefully. Note the daily amount of drainage and color of the fluid in the drain. Be sure to ask when you should return to have the tubes taken out.  · You may need to go back to have your stitches (sutures) or staples taken out. Make sure you know when to do that.  · Carefully check your surgical cut (incision) area every day. Make sure there are no signs of infection, such as:  ¨ Pain.  ¨ Swelling.  ¨ Redness.  ¨ Warmth.  ¨ An opening of the incision.  ¨ Bleeding or leaking fluid.  · Keep the incision area dry. Do not use lotions or creams unless your caregiver tells you to.  Diet  · You may not feel like eating for a while. This is normal and may last for a few weeks. When you are able to eat, try to eat:  · Fruits and vegetables.  · Foods with lean protein. Examples are boneless and skinless chicken breasts, lean beef, egg whites, and seafood like tuna and shrimp.  · Low-fat dairy products.  · Foods that are high in fiber. Examples are whole grains, beans, most fruits, and nuts.  · Do not eat foods that contain a lot of fat. They can be hard to digest.  · Drink enough fluids to keep your urine clear or pale yellow. This helps prevent constipation.  · Try eating small portions more often than 3 times a day. Do not skip meals.  · Weigh yourself once a week. Wear the same amount of clothes each time you weigh yourself.  Activities  · Do not lift anything heavy for 3 to 6 weeks after your surgery. Do not lift anything heavier than 10 pounds (4.5 kg) until your caregiver says it is okay.  · Try to walk 100 yards (90 meters) every day. Do not push yourself too hard. After a few weeks, start to slowly increase how far you walk.  · You can take showers after your bandages are off. Do not take tub  baths or swim until your caregiver says it is okay.  · Do not drive if you are taking narcotic pain medicines.  · Ask your caregiver whether it is okay for you do to certain activities, such as going back to work, driving a car, or having sex. It may be a few months before you can go back to all your normal activities.  Follow up  · Keep all your appointments. This is how your caregiver can tell if you are getting better.  · Call your caregiver with any questions.  SEEK MEDICAL CARE IF:  · Your appetite does not get better.  · You have nausea that will not go away.  · You have constipation.  · Your pain does not go away, even after taking pain medicine.  · You become very thirsty, overly tired, dizzy, or you need to urinate often.  SEEK IMMEDIATE MEDICAL CARE IF:  · You cannot eat.  · You are vomiting.  · You have very bad pain that is getting worse.  · You are very tired.  · You have very bad constipation or diarrhea.  · Your skin around the incision or drainage tubes becomes swollen, red, or leaks blood or other fluid.  · Your incision or drainage area hurts.  · You notice a bad smell or a change in the color of fluid in the drainage tube.  · Your incision starts to open.  · You have a fever.  · You have chest pain or difficulty breathing.  MAKE SURE YOU:  · Understand these instructions.  · Will watch your condition.  · Will get help right away if you are not doing well or get worse.     This information is not intended to replace advice given to you by your health care provider. Make sure you discuss any questions you have with your health care provider.     Open Cholecystectomy, Care After  Refer to this sheet in the next few weeks. These instructions provide you with information on caring for yourself after your procedure. Your health care provider may also give you more specific instructions. Your treatment has been planned according to current medical practices, but problems sometimes occur. Call your health  care provider if you have any problems or questions after your procedure.  WHAT TO EXPECT AFTER THE PROCEDURE  After your procedure, it is typical to have the following:  · Pain at your incision site. You will be given medicines to control this pain.  · Constipation. You may be given stool softeners to help prevent this.  HOME CARE INSTRUCTIONS   · Change bandages (dressings) as directed by your health care provider.  · Keep the wound dry and clean. You may wash the wound gently with soap and water. Gently blot or dab the wound dry.  · It is okay to take showers 48 hours after surgery. Do not take baths or use swimming pools or hot tubs for 2 weeks or until your health care provider approves.  · Only take over-the-counter or prescription medicines as directed by your health care provider.  · Continue your normal diet as directed by your health care provider. Eat plenty of fruits and vegetables to help prevent constipation.  · Drink enough fluids to keep your urine clear or pale yellow. This also helps prevent constipation.  · Do not lift anything heavier than 10 pounds (4.5 kg) for 1 month or until your health care provider approves.  · Do not play contact sports for 1 month or until your health care provider approves.  · Do not return to work or school for 4 weeks or until your health care provider approves.  SEEK MEDICAL CARE IF:   · You have redness, swelling, or increasing pain in the wound.  · You see a yellowish-white fluid (pus) coming from the wound.  · You have drainage from the wound that lasts longer than 1 day.  · You notice a bad smell coming from the wound or dressing.  · Your wound breaks open after the stitches (sutures) or staples have been removed.  · You have increasing pain in the shoulders (shoulder strap areas).  · You have dizzy episodes or faint while standing.  · You feel sick to your stomach (nauseous) or throw up (vomit).  SEEK IMMEDIATE MEDICAL CARE IF:   · You have a fever.  · You  develop a rash.  · You have difficulty breathing.  · You have chest pain.  · You have severe abdominal pain.  · You have leg pain.     This information is not intended to replace advice given to you by your health care provider. Make sure you discuss any questions you have with your health care provider.     Oxygen Use at Home  Oxygen can be prescribed for home use. The prescription will show the flow rate. This is how much oxygen is to be used per minute. This will be listed in liters per minute (LPM or L/M). A liter is a metric measurement of volume.  You will use oxygen therapy as directed. It can be used while exercising, sleeping, or at rest. You may need oxygen continuously. Your health care provider may order a blood oxygen test (arterial blood gas or pulse oximetry test) that will show what your oxygen level is. Your health care provider will use these measurements to learn about your needs and follow your progress.  Home oxygen therapy is commonly used on patients with various lung (pulmonary) related conditions. Some of these conditions include:  · Asthma.  · Lung cancer.  · Pneumonia.  · Emphysema.  · Chronic bronchitis.  · Cystic fibrosis.  · Other lung diseases.  · Pulmonary fibrosis.  · Occupational lung disease.  · Heart failure.  · Chronic obstructive pulmonary disease (COPD).  3 COMMON WAYS OF PROVIDING OXYGEN THERAPY  · Gas: The gas form of oxygen is put into variously sized cylinders or tanks. The cylinders or oxygen tanks contain compressed oxygen. The cylinder is equipped with a regulator that controls the flow rate. Because the flow of oxygen out of the cylinder is constant, an oxygen conserving device may be attached to the system to avoid waste. This device releases the gas only when you inhale and cuts it off when you exhale. Oxygen can be provided in a small cylinder that can be carried with you. Large tanks are heavy and are only for stationary use. After use, empty tanks must be exchanged  for full tanks.  · Liquid: The liquid form of oxygen is put into a container similar to a thermos. When released, the liquid converts to a gas and you breathe it in just like the compressed gas. This storage method takes up less space than the compressed gas cylinder, and you can transfer the liquid to a small, portable vessel at home. Liquid oxygen is more expensive than the compressed gas, and the vessel vents when not in use. An oxygen conserving device may be built into the vessel to conserve the oxygen. Liquid oxygen is very cold, around 297° below zero.  · Oxygen concentrator: This medical device filters oxygen from room air and gives almost 100% oxygen to the patient. Oxygen concentrators are powered by electricity. Benefits of this system are:  ¨ It does not need to be resupplied.  ¨ It is not as costly as liquid oxygen.  ¨ Extra tubing permits the user to move around easier.  There are several types of small, portable oxygen systems available which can help you remain active and mobile. You must have a cylinder of oxygen as a backup in the event of a power failure. Advise your electric power company that you are on oxygen therapy in order to get priority service when there is a power failure.  OXYGEN DELIVERY DEVICES  There are 3 common ways to deliver oxygen to your body.  · Nasal cannula. This is a 2-pronged device inserted in the nostrils that is connected to tubing carrying the oxygen. The tubing can rest on the ears or be attached to the frame of eyeglasses.  · Mask. People who need a high flow of oxygen generally use a mask.  · Transtracheal catheter. Transtracheal oxygen therapy requires the insertion of a small, flexible tube (catheter) in the windpipe (trachea). This catheter is held in place by a necklace. Since transtracheal oxygen bypasses the mouth, nose, and throat, a humidifier is absolutely required at flow rates of 1 LPM or greater.  OXYGEN USE SAFETY TIPS  · Never smoke while using oxygen.  "Oxygen does not burn or explode, but flammable materials will burn faster in the presence of oxygen.  · Keep a fire extinguisher close by. Let your fire department know that you have oxygen in your home.  · Warn visitors not to smoke near you when you are using oxygen. Put up \"no smoking\" signs in your home where you most often use the oxygen.  · When you go to a restaurant with your portable oxygen source, ask to be seated in the nonsmoking section.  · Stay at least 5 feet away from gas stoves, candles, lighted fireplaces, or other heat sources.  · Do not use materials that burn easily (flammable) while using your oxygen.  · If you use an oxygen cylinder, make sure it is secured to some fixed object or in a stand. If you use liquid oxygen, make sure the vessel is kept upright to keep the oxygen from pouring out. Liquid oxygen is so cold it can hurt your skin.  · If you use an oxygen concentrator, call your electric company so you will be given priority service if your power goes out. Avoid using extension cords, if possible.  · Regularly test your smoke detectors at home to make sure they work. If you receive care in your home from a nurse or other health care provider, he or she may also check to make sure your smoke detectors work.  GUIDELINES FOR CLEANING YOUR EQUIPMENT  · Wash the nasal prongs with a liquid soap. Thoroughly rinse them once or twice a week.  · Replace the prongs every 2 to 4 weeks. If you have an infection (cold, pneumonia) change them when you are well.  · Your health care provider will give you instructions on how to clean your transtracheal catheter.  · The humidifier bottle should be washed with soap and warm water and rinsed thoroughly between each refill. Air-dry the bottle before filling it with sterile or distilled water. The bottle and its top should be disinfected after they are cleaned.  · If you use an oxygen concentrator, unplug the unit. Then wipe down the cabinet with a damp cloth " and dry it daily. The air filter should be cleaned at least twice a week.  · Follow your home medical equipment and service company's directions for cleaning the compressor filter.  HOME CARE INSTRUCTIONS   · Do not change the flow of oxygen unless directed by your health care provider.  · Do not use alcohol or other sedating drugs unless instructed. They slow your breathing rate.  · Do not use materials that burn easily (flammable) while using your oxygen.  · Always keep a spare tank of oxygen. Plan ahead for holidays when you may not be able to get a prescription filled.  · Use water-based lubricants on your lips or nostrils. Do not use an oil-based product like petroleum jelly.  · To prevent your cheeks or the skin behind your ears from becoming irritated, tuck some gauze under the tubing.  · If you have persistent redness under your nose, call your health care provider.  · When you no longer need oxygen, your doctor will have the oxygen discontinued. Oxygen is not addicting or habit forming.  · Use the oxygen as instructed. Too much oxygen can be harmful and too little will not give you the benefit you need.  · Shortness of breath is not always from a lack of oxygen. If your oxygen level is not the cause of your shortness of breath, taking oxygen will not help.  SEEK MEDICAL CARE IF:   · You have frequent headaches.  · You have shortness of breath or a lasting cough.  · You have anxiety.  · You are confused.  · You are drowsy or sleepy all the time.  · You develop an illness which aggravates your breathing.  · You cannot exercise.  · You are restless.  · You have blue lips or fingernails.  · You have difficult or irregular breathing and it is getting worse.  · You have a fever.     This information is not intended to replace advice given to you by your health care provider. Make sure you discuss any questions you have with your health care provider.

## 2017-08-15 NOTE — PROGRESS NOTES
Pt O2 sat redone this AM. Pt does require O2 while at rest, however pt O2 saturations are in the upper 90s with ambulation. Pt aware waiting on O2 approval and delivery.

## 2017-08-15 NOTE — DISCHARGE PLANNING
CCS spoke to Susy at [facility] per Susy they had not processed the referral yet. SW on floor Mary was notified. Per the request of the Sw on floor Referral has been forwarded to [facility]. [facility]

## 2017-08-15 NOTE — PROGRESS NOTES
Report received from day shift RN, patient care assumed at 1900.   Patient assessment as documented.   Patient c/o 7/10 pain to abdomen at her surgical incision site. Medicated per MAR with PRN oxycodone.    Breathing is even and unlabored on 0.5L02 with .   Patient is ambulatory with SBA.   SCDs in use.   Midline abdominal incision noted, ANA CRISTINA with staples. No drainage, edema or erythema noted.   Patient is resting in bed. Bed in low position, call light within reach. Hourly rounding in place.  Patient has no further complaints at this time, will continue to monitor.

## 2017-08-15 NOTE — DISCHARGE SUMMARY
HISTORY OF PRESENT ILLNESS:  This is a 57-year-old female patient status post   extended distal pancreatectomy with portal vein resection and partial celiac   trunk resection.  Patient had surgery on 08/09/2017, now she is approximately   postoperative day #5, tolerating a general diet, ambulating, having bowel   movements.  Unfortunately, she is needing O2 for home because her sats have   been dropping below 90% on room air.    PHYSICAL EXAMINATION PRIOR TO DISCHARGE:  HEENT:  Extraocular movements intact.  No sign of jaundice.  NECK:  Soft and supple.  There is no cervical, supraclavicular, or occipital   lymphadenopathy.  LUNGS:  Clear to auscultation.  Good inspiratory effort.  CARDIOVASCULAR:  Regular rhythm.  ABDOMEN:  Soft, nontender.  Wounds are clean, dry and intact.  MICHELLE drain is   out.  His amylase is normal.  EXTREMITIES:  No clubbing, cyanosis or edema.    ASSESSMENT AND PLAN:  Based on findings, patient will be discharged home on   Roxicet and Toradol.  She has been instructed how to take her medications.    She is instructed to follow up on Monday, the 21st for staple removal and   follow up with me on the 29th in the office for postoperative visit.       ____________________________________     MD WICHO RAMEY / ADRIAN    DD:  08/15/2017 07:22:52  DT:  08/15/2017 07:36:20    D#:  [id number]  Job#:  [id number]

## 2017-08-15 NOTE — DISCHARGE PLANNING
Referral: Oxygen    Intervention:  CCS faxed Oxygen Order to [facility].      Plan: Home with O2, pending medical clearance and the delivery of portable.

## 2017-08-15 NOTE — CARE PLAN
Problem: Safety  Goal: Will remain free from injury  Bed alarm in use. Bed in lowest, locked position. Call light within reach. Hourly rounding in place.    Problem: Pain Management  Goal: Pain level will decrease to patient’s comfort goal  Patient medicated per MAR with scheduled MS Contin and PRN oxycodone.

## 2017-08-16 LAB
BACTERIA WND AEROBE CULT: NORMAL
GRAM STN SPEC: NORMAL
SIGNIFICANT IND 70042: NORMAL
SITE SITE: NORMAL
SOURCE SOURCE: NORMAL

## 2017-08-18 NOTE — ADDENDUM NOTE
Encounter addended by: Elizabeth L. Sawallisch on: 8/18/2017 11:04 AM<BR>     Documentation filed: Clinical Notes

## 2017-08-18 NOTE — DOCUMENTATION QUERY
"DOCUMENTATION QUERY    PROVIDERS: Please select “Cosign w/ note”to reply to query.    To better represent the severity of illness of your patient, please review the following information and exercise your independent professional judgment in responding to this query.     Per coding guidelines, codes may not be assigned from a pathology report without the attending physician's confirmation. The patient presented with a malignant neoplasm, of the body of the pancreas.  A extended distal pancreatic splenectomy, cholecystectomy, and excision of the portal vein were performed and pathology documented the following:  \"Moderately differentiated adenocarcinoma of pancreas, tumors close to splenic artery/celiac trunk margin\" and \"Focal moderately differentiated adenocarcinoma, not present on margin.\" Also documented in the pathology is \"One lymph node positive for metastatic carcinoma.\" Based upon the clinical findings, risk factors, and treatment, can this diagnosis be provided to support these finding?  Please chose all that apply:    • Primary Malignant Tumor (please document site)  • Malignant Secondary Tumor (please document sites)  • Other explanation of clinical findings (please document)  • Unable to determine      The medical record reflects the following:   Clinical Findings     Treatment     Risk Factors     Location within medical record  Pathology Reports     Thank you,   Elizabeth Sawallisch      I don't understand your question  "

## 2017-08-22 NOTE — ADDENDUM NOTE
Encounter addended by: Elizabeth L. Sawallisch on: 8/22/2017  7:31 AM<BR>     Documentation filed: Clinical Notes

## 2017-08-25 ENCOUNTER — HOSPITAL ENCOUNTER (INPATIENT)
Facility: MEDICAL CENTER | Age: 57
LOS: 4 days | DRG: 856 | End: 2017-08-29
Attending: EMERGENCY MEDICINE | Admitting: HOSPITALIST
Payer: COMMERCIAL

## 2017-08-25 ENCOUNTER — APPOINTMENT (OUTPATIENT)
Dept: RADIOLOGY | Facility: MEDICAL CENTER | Age: 57
DRG: 856 | End: 2017-08-25
Attending: EMERGENCY MEDICINE
Payer: COMMERCIAL

## 2017-08-25 ENCOUNTER — RESOLUTE PROFESSIONAL BILLING HOSPITAL PROF FEE (OUTPATIENT)
Dept: HOSPITALIST | Facility: MEDICAL CENTER | Age: 57
End: 2017-08-25
Payer: COMMERCIAL

## 2017-08-25 DIAGNOSIS — R18.8 INTRA-ABDOMINAL FLUID COLLECTION: ICD-10-CM

## 2017-08-25 DIAGNOSIS — L03.311 ABDOMINAL WALL CELLULITIS: ICD-10-CM

## 2017-08-25 PROBLEM — A41.9 SEPSIS, UNSPECIFIED: Status: ACTIVE | Noted: 2017-08-25

## 2017-08-25 PROBLEM — E87.6 HYPOKALEMIA: Status: ACTIVE | Noted: 2017-08-25

## 2017-08-25 PROBLEM — C25.9 PANCREATIC CANCER (HCC): Status: ACTIVE | Noted: 2017-08-25

## 2017-08-25 PROBLEM — D75.839 THROMBOCYTOSIS: Status: ACTIVE | Noted: 2017-08-25

## 2017-08-25 PROBLEM — E87.1 HYPONATREMIA: Status: ACTIVE | Noted: 2017-08-25

## 2017-08-25 LAB
ALBUMIN SERPL BCP-MCNC: 3.2 G/DL (ref 3.2–4.9)
ALBUMIN/GLOB SERPL: 0.9 G/DL
ALP SERPL-CCNC: 172 U/L (ref 30–99)
ALT SERPL-CCNC: 21 U/L (ref 2–50)
ANION GAP SERPL CALC-SCNC: 17 MMOL/L (ref 0–11.9)
APPEARANCE UR: CLEAR
AST SERPL-CCNC: 43 U/L (ref 12–45)
BASOPHILS # BLD AUTO: 0.4 % (ref 0–1.8)
BASOPHILS # BLD AUTO: 0.6 % (ref 0–1.8)
BASOPHILS # BLD: 0.09 K/UL (ref 0–0.12)
BASOPHILS # BLD: 0.13 K/UL (ref 0–0.12)
BILIRUB SERPL-MCNC: 0.6 MG/DL (ref 0.1–1.5)
BILIRUB UR QL STRIP.AUTO: NEGATIVE
BUN SERPL-MCNC: 9 MG/DL (ref 8–22)
CALCIUM SERPL-MCNC: 9.1 MG/DL (ref 8.5–10.5)
CHLORIDE SERPL-SCNC: 93 MMOL/L (ref 96–112)
CO2 SERPL-SCNC: 24 MMOL/L (ref 20–33)
COLOR UR: YELLOW
CREAT SERPL-MCNC: 0.46 MG/DL (ref 0.5–1.4)
EOSINOPHIL # BLD AUTO: 0.04 K/UL (ref 0–0.51)
EOSINOPHIL # BLD AUTO: 0.08 K/UL (ref 0–0.51)
EOSINOPHIL NFR BLD: 0.2 % (ref 0–6.9)
EOSINOPHIL NFR BLD: 0.4 % (ref 0–6.9)
ERYTHROCYTE [DISTWIDTH] IN BLOOD BY AUTOMATED COUNT: 49.8 FL (ref 35.9–50)
ERYTHROCYTE [DISTWIDTH] IN BLOOD BY AUTOMATED COUNT: 50.4 FL (ref 35.9–50)
GFR SERPL CREATININE-BSD FRML MDRD: >60 ML/MIN/1.73 M 2
GLOBULIN SER CALC-MCNC: 3.5 G/DL (ref 1.9–3.5)
GLUCOSE SERPL-MCNC: 85 MG/DL (ref 65–99)
GLUCOSE UR STRIP.AUTO-MCNC: NEGATIVE MG/DL
HCT VFR BLD AUTO: 32.1 % (ref 37–47)
HCT VFR BLD AUTO: 34 % (ref 37–47)
HGB BLD-MCNC: 10.6 G/DL (ref 12–16)
HGB BLD-MCNC: 11.2 G/DL (ref 12–16)
IMM GRANULOCYTES # BLD AUTO: 0.07 K/UL (ref 0–0.11)
IMM GRANULOCYTES # BLD AUTO: 0.11 K/UL (ref 0–0.11)
IMM GRANULOCYTES NFR BLD AUTO: 0.3 % (ref 0–0.9)
IMM GRANULOCYTES NFR BLD AUTO: 0.5 % (ref 0–0.9)
KETONES UR STRIP.AUTO-MCNC: >=160 MG/DL
LACTATE BLD-SCNC: 1.5 MMOL/L (ref 0.5–2)
LACTATE BLD-SCNC: 2 MMOL/L (ref 0.5–2)
LEUKOCYTE ESTERASE UR QL STRIP.AUTO: NEGATIVE
LYMPHOCYTES # BLD AUTO: 1.24 K/UL (ref 1–4.8)
LYMPHOCYTES # BLD AUTO: 1.68 K/UL (ref 1–4.8)
LYMPHOCYTES NFR BLD: 5.8 % (ref 22–41)
LYMPHOCYTES NFR BLD: 8 % (ref 22–41)
MCH RBC QN AUTO: 28.4 PG (ref 27–33)
MCH RBC QN AUTO: 28.8 PG (ref 27–33)
MCHC RBC AUTO-ENTMCNC: 32.9 G/DL (ref 33.6–35)
MCHC RBC AUTO-ENTMCNC: 33 G/DL (ref 33.6–35)
MCV RBC AUTO: 86.1 FL (ref 81.4–97.8)
MCV RBC AUTO: 87.4 FL (ref 81.4–97.8)
MICRO URNS: NORMAL
MONOCYTES # BLD AUTO: 1.68 K/UL (ref 0–0.85)
MONOCYTES # BLD AUTO: 1.99 K/UL (ref 0–0.85)
MONOCYTES NFR BLD AUTO: 7.9 % (ref 0–13.4)
MONOCYTES NFR BLD AUTO: 9.4 % (ref 0–13.4)
NEUTROPHILS # BLD AUTO: 17.17 K/UL (ref 2–7.15)
NEUTROPHILS # BLD AUTO: 18.22 K/UL (ref 2–7.15)
NEUTROPHILS NFR BLD: 81.3 % (ref 44–72)
NEUTROPHILS NFR BLD: 85.2 % (ref 44–72)
NITRITE UR QL STRIP.AUTO: NEGATIVE
NRBC # BLD AUTO: 0 K/UL
NRBC # BLD AUTO: 0 K/UL
NRBC BLD AUTO-RTO: 0 /100 WBC
NRBC BLD AUTO-RTO: 0 /100 WBC
PH UR STRIP.AUTO: 5.5 [PH]
PLATELET # BLD AUTO: 1493 K/UL (ref 164–446)
PLATELET # BLD AUTO: 1590 K/UL (ref 164–446)
PMV BLD AUTO: 9.1 FL (ref 9–12.9)
PMV BLD AUTO: 9.1 FL (ref 9–12.9)
POTASSIUM SERPL-SCNC: 3.2 MMOL/L (ref 3.6–5.5)
PROT SERPL-MCNC: 6.7 G/DL (ref 6–8.2)
PROT UR QL STRIP: NEGATIVE MG/DL
RBC # BLD AUTO: 3.73 M/UL (ref 4.2–5.4)
RBC # BLD AUTO: 3.89 M/UL (ref 4.2–5.4)
RBC UR QL AUTO: NEGATIVE
SODIUM SERPL-SCNC: 134 MMOL/L (ref 135–145)
SP GR UR REFRACTOMETRY: >1.035
UROBILINOGEN UR STRIP.AUTO-MCNC: 0.2 MG/DL
WBC # BLD AUTO: 21.1 K/UL (ref 4.8–10.8)
WBC # BLD AUTO: 21.4 K/UL (ref 4.8–10.8)

## 2017-08-25 PROCEDURE — 82668 ASSAY OF ERYTHROPOIETIN: CPT

## 2017-08-25 PROCEDURE — 81270 JAK2 GENE: CPT

## 2017-08-25 PROCEDURE — 770006 HCHG ROOM/CARE - MED/SURG/GYN SEMI*

## 2017-08-25 PROCEDURE — 99285 EMERGENCY DEPT VISIT HI MDM: CPT

## 2017-08-25 PROCEDURE — 85025 COMPLETE CBC W/AUTO DIFF WBC: CPT

## 2017-08-25 PROCEDURE — 87086 URINE CULTURE/COLONY COUNT: CPT

## 2017-08-25 PROCEDURE — 96376 TX/PRO/DX INJ SAME DRUG ADON: CPT

## 2017-08-25 PROCEDURE — 94760 N-INVAS EAR/PLS OXIMETRY 1: CPT

## 2017-08-25 PROCEDURE — A9270 NON-COVERED ITEM OR SERVICE: HCPCS | Performed by: HOSPITALIST

## 2017-08-25 PROCEDURE — 81003 URINALYSIS AUTO W/O SCOPE: CPT

## 2017-08-25 PROCEDURE — 36415 COLL VENOUS BLD VENIPUNCTURE: CPT

## 2017-08-25 PROCEDURE — 700102 HCHG RX REV CODE 250 W/ 637 OVERRIDE(OP): Performed by: HOSPITALIST

## 2017-08-25 PROCEDURE — 74177 CT ABD & PELVIS W/CONTRAST: CPT

## 2017-08-25 PROCEDURE — 83605 ASSAY OF LACTIC ACID: CPT | Mod: 91

## 2017-08-25 PROCEDURE — 700105 HCHG RX REV CODE 258: Performed by: HOSPITALIST

## 2017-08-25 PROCEDURE — 71010 DX-CHEST-PORTABLE (1 VIEW): CPT

## 2017-08-25 PROCEDURE — 99223 1ST HOSP IP/OBS HIGH 75: CPT | Performed by: HOSPITALIST

## 2017-08-25 PROCEDURE — 96375 TX/PRO/DX INJ NEW DRUG ADDON: CPT

## 2017-08-25 PROCEDURE — 700111 HCHG RX REV CODE 636 W/ 250 OVERRIDE (IP): Performed by: HOSPITALIST

## 2017-08-25 PROCEDURE — 700117 HCHG RX CONTRAST REV CODE 255: Performed by: EMERGENCY MEDICINE

## 2017-08-25 PROCEDURE — 96365 THER/PROPH/DIAG IV INF INIT: CPT

## 2017-08-25 PROCEDURE — 80053 COMPREHEN METABOLIC PANEL: CPT

## 2017-08-25 PROCEDURE — 87040 BLOOD CULTURE FOR BACTERIA: CPT | Mod: 91

## 2017-08-25 PROCEDURE — 700111 HCHG RX REV CODE 636 W/ 250 OVERRIDE (IP): Performed by: EMERGENCY MEDICINE

## 2017-08-25 RX ORDER — AMOXICILLIN 250 MG
2 CAPSULE ORAL 2 TIMES DAILY
Status: DISCONTINUED | OUTPATIENT
Start: 2017-08-25 | End: 2017-08-29

## 2017-08-25 RX ORDER — FLUOXETINE HYDROCHLORIDE 20 MG/1
40 CAPSULE ORAL DAILY
Status: DISCONTINUED | OUTPATIENT
Start: 2017-08-26 | End: 2017-08-29 | Stop reason: HOSPADM

## 2017-08-25 RX ORDER — LORAZEPAM 2 MG/ML
1 INJECTION INTRAMUSCULAR ONCE
Status: COMPLETED | OUTPATIENT
Start: 2017-08-25 | End: 2017-08-25

## 2017-08-25 RX ORDER — PROMETHAZINE HYDROCHLORIDE 25 MG/1
12.5-25 SUPPOSITORY RECTAL EVERY 4 HOURS PRN
Status: DISCONTINUED | OUTPATIENT
Start: 2017-08-25 | End: 2017-08-29 | Stop reason: HOSPADM

## 2017-08-25 RX ORDER — BISACODYL 10 MG
10 SUPPOSITORY, RECTAL RECTAL
Status: DISCONTINUED | OUTPATIENT
Start: 2017-08-25 | End: 2017-08-29

## 2017-08-25 RX ORDER — SODIUM CHLORIDE 9 MG/ML
500 INJECTION, SOLUTION INTRAVENOUS
Status: COMPLETED | OUTPATIENT
Start: 2017-08-25 | End: 2017-08-25

## 2017-08-25 RX ORDER — OXYCODONE HYDROCHLORIDE 5 MG/1
2.5 TABLET ORAL
Status: DISCONTINUED | OUTPATIENT
Start: 2017-08-25 | End: 2017-08-29 | Stop reason: HOSPADM

## 2017-08-25 RX ORDER — MORPHINE SULFATE 4 MG/ML
2 INJECTION, SOLUTION INTRAMUSCULAR; INTRAVENOUS
Status: DISCONTINUED | OUTPATIENT
Start: 2017-08-25 | End: 2017-08-27

## 2017-08-25 RX ORDER — HEPARIN SODIUM 5000 [USP'U]/ML
5000 INJECTION, SOLUTION INTRAVENOUS; SUBCUTANEOUS EVERY 8 HOURS
Status: DISCONTINUED | OUTPATIENT
Start: 2017-08-25 | End: 2017-08-26

## 2017-08-25 RX ORDER — LORAZEPAM 1 MG/1
0.5 TABLET ORAL EVERY 6 HOURS PRN
Status: DISCONTINUED | OUTPATIENT
Start: 2017-08-25 | End: 2017-08-29 | Stop reason: HOSPADM

## 2017-08-25 RX ORDER — LORAZEPAM 1 MG/1
1 TABLET ORAL 2 TIMES DAILY
Status: DISCONTINUED | OUTPATIENT
Start: 2017-08-25 | End: 2017-08-29 | Stop reason: HOSPADM

## 2017-08-25 RX ORDER — POLYETHYLENE GLYCOL 3350 17 G/17G
1 POWDER, FOR SOLUTION ORAL
Status: DISCONTINUED | OUTPATIENT
Start: 2017-08-25 | End: 2017-08-29 | Stop reason: HOSPADM

## 2017-08-25 RX ORDER — OXYCODONE HYDROCHLORIDE 5 MG/1
5 TABLET ORAL
Status: DISCONTINUED | OUTPATIENT
Start: 2017-08-25 | End: 2017-08-29 | Stop reason: HOSPADM

## 2017-08-25 RX ORDER — ONDANSETRON 4 MG/1
4 TABLET, ORALLY DISINTEGRATING ORAL EVERY 4 HOURS PRN
Status: DISCONTINUED | OUTPATIENT
Start: 2017-08-25 | End: 2017-08-29 | Stop reason: HOSPADM

## 2017-08-25 RX ORDER — LORAZEPAM 2 MG/ML
1 INJECTION INTRAMUSCULAR ONCE
Status: DISCONTINUED | OUTPATIENT
Start: 2017-08-25 | End: 2017-08-25

## 2017-08-25 RX ORDER — LORAZEPAM 2 MG/ML
0.5 INJECTION INTRAMUSCULAR EVERY 6 HOURS PRN
Status: DISCONTINUED | OUTPATIENT
Start: 2017-08-25 | End: 2017-08-28

## 2017-08-25 RX ORDER — PROMETHAZINE HYDROCHLORIDE 25 MG/1
12.5-25 TABLET ORAL EVERY 4 HOURS PRN
Status: DISCONTINUED | OUTPATIENT
Start: 2017-08-25 | End: 2017-08-29 | Stop reason: HOSPADM

## 2017-08-25 RX ORDER — ONDANSETRON 2 MG/ML
4 INJECTION INTRAMUSCULAR; INTRAVENOUS EVERY 4 HOURS PRN
Status: DISCONTINUED | OUTPATIENT
Start: 2017-08-25 | End: 2017-08-28

## 2017-08-25 RX ORDER — OXYCODONE HYDROCHLORIDE 10 MG/1
10 TABLET ORAL
Status: DISCONTINUED | OUTPATIENT
Start: 2017-08-25 | End: 2017-08-29 | Stop reason: HOSPADM

## 2017-08-25 RX ORDER — SODIUM CHLORIDE 9 MG/ML
30 INJECTION, SOLUTION INTRAVENOUS
Status: COMPLETED | OUTPATIENT
Start: 2017-08-25 | End: 2017-08-26

## 2017-08-25 RX ORDER — SODIUM CHLORIDE 9 MG/ML
INJECTION, SOLUTION INTRAVENOUS CONTINUOUS
Status: DISCONTINUED | OUTPATIENT
Start: 2017-08-25 | End: 2017-08-28

## 2017-08-25 RX ORDER — KETOROLAC TROMETHAMINE 10 MG/1
10 TABLET, FILM COATED ORAL EVERY 6 HOURS PRN
Status: DISCONTINUED | OUTPATIENT
Start: 2017-08-25 | End: 2017-08-29 | Stop reason: HOSPADM

## 2017-08-25 RX ADMIN — HYDROMORPHONE HYDROCHLORIDE 1 MG: 1 INJECTION, SOLUTION INTRAMUSCULAR; INTRAVENOUS; SUBCUTANEOUS at 14:41

## 2017-08-25 RX ADMIN — IOHEXOL 100 ML: 350 INJECTION, SOLUTION INTRAVENOUS at 16:27

## 2017-08-25 RX ADMIN — LORAZEPAM 0.5 MG: 2 INJECTION INTRAMUSCULAR; INTRAVENOUS at 20:45

## 2017-08-25 RX ADMIN — LORAZEPAM 1 MG: 1 TABLET ORAL at 21:09

## 2017-08-25 RX ADMIN — OXYCODONE HYDROCHLORIDE 10 MG: 10 TABLET ORAL at 21:10

## 2017-08-25 RX ADMIN — SODIUM CHLORIDE: 9 INJECTION, SOLUTION INTRAVENOUS at 21:31

## 2017-08-25 RX ADMIN — PIPERACILLIN SODIUM AND TAZOBACTAM SODIUM 4.5 G: 4; .5 INJECTION, POWDER, FOR SOLUTION INTRAVENOUS at 20:45

## 2017-08-25 RX ADMIN — KETOROLAC TROMETHAMINE 10 MG: 10 TABLET, FILM COATED ORAL at 21:10

## 2017-08-25 RX ADMIN — STANDARDIZED SENNA CONCENTRATE AND DOCUSATE SODIUM 2 TABLET: 8.6; 5 TABLET, FILM COATED ORAL at 21:10

## 2017-08-25 RX ADMIN — MORPHINE SULFATE 2 MG: 4 INJECTION INTRAVENOUS at 21:11

## 2017-08-25 RX ADMIN — HYDROMORPHONE HYDROCHLORIDE 1 MG: 1 INJECTION, SOLUTION INTRAMUSCULAR; INTRAVENOUS; SUBCUTANEOUS at 16:00

## 2017-08-25 RX ADMIN — OXYCODONE HYDROCHLORIDE 5 MG: 5 TABLET ORAL at 19:35

## 2017-08-25 RX ADMIN — PROMETHAZINE HYDROCHLORIDE 25 MG: 25 TABLET ORAL at 21:10

## 2017-08-25 RX ADMIN — LORAZEPAM 1 MG: 2 INJECTION INTRAMUSCULAR; INTRAVENOUS at 16:00

## 2017-08-25 RX ADMIN — HEPARIN SODIUM 5000 UNITS: 5000 INJECTION, SOLUTION INTRAVENOUS; SUBCUTANEOUS at 21:10

## 2017-08-25 ASSESSMENT — PATIENT HEALTH QUESTIONNAIRE - PHQ9
SUM OF ALL RESPONSES TO PHQ9 QUESTIONS 1 AND 2: 0
SUM OF ALL RESPONSES TO PHQ QUESTIONS 1-9: 0
1. LITTLE INTEREST OR PLEASURE IN DOING THINGS: NOT AT ALL
2. FEELING DOWN, DEPRESSED, IRRITABLE, OR HOPELESS: NOT AT ALL

## 2017-08-25 ASSESSMENT — LIFESTYLE VARIABLES
EVER_SMOKED: YES
ALCOHOL_USE: NO

## 2017-08-25 ASSESSMENT — PAIN SCALES - GENERAL
PAINLEVEL_OUTOF10: 10

## 2017-08-25 NOTE — ED NOTES
Lab called with a platelet count of [id number]. Lab results read back by RN. ERP notified and lab results read back by ERP

## 2017-08-25 NOTE — ED PROVIDER NOTES
"ED Provider Note    Scribed for Zacarias Scott D.O. by Jessica Correia. 8/25/2017  2:03 PM    Primary care provider: Alton Layton M.D.  Means of arrival: ambulance   History obtained from: patient   History limited by: none     CHIEF COMPLAINT  Chief Complaint   Patient presents with   • Post-Op Complications     BIB EMS from pain MGT office. pt sent for abdominal distention, and redness, swelling at incision site.       HPI  Frieda Huizar is a 57 y.o. female who presents to the Emergency Department secondary to experiencing post op complications onset yesterday. The patient had a partial pancreatectomy and cholecystectomy on 8/9/2017 by Dr. Lancaster. Patient is complaining of associated pain in her stomach where her incision site is and across her back. Patient states that she saw Dr. Lancaster 2 days ago and her incision looked normal at that time. She states that the pain is \"very bad\" today which prompted her to come to the ED. She reports that her incision started turning red yesterday and she denies any other generalized pain. She is hydrating regularly, however, she has not been urinating or having bowel movements for about a week. Patient denies nausea. She reports taking OxyContin and Toradol for relief of her pain, as well as, Prozac and Lorazepam. Her last period was 10 years ago.       REVIEW OF SYSTEMS  Pertinent positives include incision site pain, back pain, abdominal pain. Pertinent negatives include no nausea, generalized pain.  All other systems reviewed and negative. C    PAST MEDICAL HISTORY  Past Medical History   Diagnosis Date   • Cancer (CMS-HCC) 2017     Pancreatic   • Pain 8/8/17     Left ankle and left shoulder.   • Psychiatric problem      Depression and anxiety.   • Dental disorder      Full upper/lower dentures.       SURGICAL HISTORY  Past Surgical History   Procedure Laterality Date   • Pancreatectomy  8/9/2017     Procedure: PANCREATECTOMY DISTAL , OMENTAL FLAP, " OPEN CHOLECYSTECTOMY;  Surgeon: Gera Lancaster M.D.;  Location: SURGERY [facility];  Service:    • Splenectomy  8/9/2017     Procedure: SPLENECTOMY;  Surgeon: Gera Lancaster M.D.;  Location: SURGERY [facility];  Service:    • Node dissection  8/9/2017     Procedure: NODE DISSECTION ;  Surgeon: Gera Lancaster M.D.;  Location: SURGERY [facility];  Service:    • Abdominal aortic aneurysm  8/9/2017     Procedure: PORTAL VEIN RECONSTRUCTION;  Surgeon: Nicolás Vila M.D.;  Location: SURGERY [facility];  Service:         SOCIAL HISTORY  Social History   Substance Use Topics   • Smoking status: Former Smoker -- 2.00 packs/day for 15 years     Types: Cigarettes     Quit date: 01/01/2008   • Smokeless tobacco: Never Used   • Alcohol Use: No      History   Drug Use   • Yes     Comment: Marijuana for pain - daily       FAMILY HISTORY  No family history noted    CURRENT MEDICATIONS  Home Medications     Reviewed by Seema Talbert (Pharmacy Tech) on 08/25/17 at 1432  Med List Status: Complete    Medication Last Dose Status    fluoxetine (PROZAC) 40 MG capsule 8/24/2017 Active    ketorolac (TORADOL) 10 MG Tab 8/25/2017 Active    lorazepam (ATIVAN) 1 MG Tab 8/25/2017 Active    oxycodone immediate release (ROXICODONE) 10 MG immediate release tablet 8/25/2017 Active                ALLERGIES  No Known Allergies    PHYSICAL EXAM  VITAL SIGNS: /90 mmHg  Pulse 84  Temp(Src) 37.2 °C (99 °F)  Resp 20  Wt 54.432 kg (120 lb)  SpO2 95%    Nursing notes and vitals reviewed.  Constitutional: Well developed, Well nourished, slight distress, Non-toxic appearance.   Eyes: PERRLA, EOMI, Conjunctiva normal, No discharge.   Cardiovascular: Normal heart rate, Normal rhythm, No murmurs, No rubs, No gallops.   Thorax & Lungs: No respiratory distress, No rales, No rhonchi, No wheezing, No chest tenderness.   Abdomen: Bowel sounds normal, Soft,Significant abdominal tenderness at the  surgical wound with induration   Skin: Warm, Dry. 14cm incision on the abdominal wall with surrounding erythema and edema, tenderness, induration on the proximal aspect of the incision.   Musculoskeletal: Intact distal pulses, No edema, No cyanosis, No clubbing. Good range of motion in all major joints. No major deformities noted, no CVA tenderness, no midline back tenderness.   Neurologic: Alert & oriented x 3, Normal motor function, Normal sensory function, No focal deficits noted.  Psychiatric: Anxious affect    DIAGNOSTIC STUDIES/PROCEDURES    LABS  Results for orders placed or performed during the hospital encounter of 08/25/17   LACTIC ACID   Result Value Ref Range    Lactic Acid 2.0 0.5 - 2.0 mmol/L   LACTIC ACID   Result Value Ref Range    Lactic Acid 1.5 0.5 - 2.0 mmol/L   CBC WITH DIFFERENTIAL   Result Value Ref Range    WBC 21.4 (H) 4.8 - 10.8 K/uL    RBC 3.89 (L) 4.20 - 5.40 M/uL    Hemoglobin 11.2 (L) 12.0 - 16.0 g/dL    Hematocrit 34.0 (L) 37.0 - 47.0 %    MCV 87.4 81.4 - 97.8 fL    MCH 28.8 27.0 - 33.0 pg    MCHC 32.9 (L) 33.6 - 35.0 g/dL    RDW 50.4 (H) 35.9 - 50.0 fL    Platelet Count 1590 (HH) 164 - 446 K/uL    MPV 9.1 9.0 - 12.9 fL    Neutrophils-Polys 85.20 (H) 44.00 - 72.00 %    Lymphocytes 5.80 (L) 22.00 - 41.00 %    Monocytes 7.90 0.00 - 13.40 %    Eosinophils 0.20 0.00 - 6.90 %    Basophils 0.60 0.00 - 1.80 %    Immature Granulocytes 0.30 0.00 - 0.90 %    Nucleated RBC 0.00 /100 WBC    Neutrophils (Absolute) 18.22 (H) 2.00 - 7.15 K/uL    Lymphs (Absolute) 1.24 1.00 - 4.80 K/uL    Monos (Absolute) 1.68 (H) 0.00 - 0.85 K/uL    Eos (Absolute) 0.04 0.00 - 0.51 K/uL    Baso (Absolute) 0.13 (H) 0.00 - 0.12 K/uL    Immature Granulocytes (abs) 0.07 0.00 - 0.11 K/uL    NRBC (Absolute) 0.00 K/uL   COMP METABOLIC PANEL   Result Value Ref Range    Sodium 134 (L) 135 - 145 mmol/L    Potassium 3.2 (L) 3.6 - 5.5 mmol/L    Chloride 93 (L) 96 - 112 mmol/L    Co2 24 20 - 33 mmol/L    Anion Gap 17.0 (H) 0.0 -  11.9    Glucose 85 65 - 99 mg/dL    Bun 9 8 - 22 mg/dL    Creatinine 0.46 (L) 0.50 - 1.40 mg/dL    Calcium 9.1 8.5 - 10.5 mg/dL    AST(SGOT) 43 12 - 45 U/L    ALT(SGPT) 21 2 - 50 U/L    Alkaline Phosphatase 172 (H) 30 - 99 U/L    Total Bilirubin 0.6 0.1 - 1.5 mg/dL    Albumin 3.2 3.2 - 4.9 g/dL    Total Protein 6.7 6.0 - 8.2 g/dL    Globulin 3.5 1.9 - 3.5 g/dL    A-G Ratio 0.9 g/dL   URINALYSIS   Result Value Ref Range    Micro Urine Req see below     Color Yellow     Character Clear     Ph 5.5 5.0-8.0    Glucose Negative Negative mg/dL    Ketones >=160 Negative mg/dL    Protein Negative Negative mg/dL    Bilirubin Negative Negative    Urobilinogen, Urine 0.2 Negative    Nitrite Negative Negative    Leukocyte Esterase Negative Negative    Occult Blood Negative Negative   ESTIMATED GFR   Result Value Ref Range    GFR If African American >60 >60 mL/min/1.73 m 2    GFR If Non African American >60 >60 mL/min/1.73 m 2   CBC WITH DIFFERENTIAL   Result Value Ref Range    WBC 21.1 (H) 4.8 - 10.8 K/uL    RBC 3.73 (L) 4.20 - 5.40 M/uL    Hemoglobin 10.6 (L) 12.0 - 16.0 g/dL    Hematocrit 32.1 (L) 37.0 - 47.0 %    MCV 86.1 81.4 - 97.8 fL    MCH 28.4 27.0 - 33.0 pg    MCHC 33.0 (L) 33.6 - 35.0 g/dL    RDW 49.8 35.9 - 50.0 fL    Platelet Count 1493 (HH) 164 - 446 K/uL    MPV 9.1 9.0 - 12.9 fL    Neutrophils-Polys 81.30 (H) 44.00 - 72.00 %    Lymphocytes 8.00 (L) 22.00 - 41.00 %    Monocytes 9.40 0.00 - 13.40 %    Eosinophils 0.40 0.00 - 6.90 %    Basophils 0.40 0.00 - 1.80 %    Immature Granulocytes 0.50 0.00 - 0.90 %    Nucleated RBC 0.00 /100 WBC    Neutrophils (Absolute) 17.17 (H) 2.00 - 7.15 K/uL    Lymphs (Absolute) 1.68 1.00 - 4.80 K/uL    Monos (Absolute) 1.99 (H) 0.00 - 0.85 K/uL    Eos (Absolute) 0.08 0.00 - 0.51 K/uL    Baso (Absolute) 0.09 0.00 - 0.12 K/uL    Immature Granulocytes (abs) 0.11 0.00 - 0.11 K/uL    NRBC (Absolute) 0.00 K/uL   REFRACTOMETER SG   Result Value Ref Range    Specific Gravity >1.035 (A)       All labs reviewed by me.    RADIOLOGY  CT-ABDOMEN-PELVIS WITH   Final Result         1. Two large heterogeneous collection arising form the surgical bed and pancreatic tail, and described above. These could relate to postsurgical seroma/hematoma or metastasis.      2. Small to collection in the anterior abdominal wall could relate to postsurgical fluid. There is also underlying 1 cm fluid collection within the abdominal wall muscle. These could relate to postoperative collection.      3. Intra and extra hepatic biliary ductal dilatation may relate to post cholecystectomy status. Correlate with LFTs.      DX-CHEST-PORTABLE (1 VIEW)   Final Result         Minimal patchy left lung base opacity could relate to atelectasis.        The radiologist's interpretation of all radiological studies have been reviewed by me.    COURSE & MEDICAL DECISION MAKING  Pertinent Labs & Imaging studies reviewed. (See chart for details)    2:03 PM - Patient seen and examined at bedside. Patient will be treated with Dilaudid 1mg, Zosyn 4.5 g, Omnipaque 350 mg/ml, Ativan 1mg. Ordered CT abdomen pelvis, chest x-ray, lactic acid, CBC, CMP, urinalysis, urine culture, blood culture, estimated GFR to evaluate her symptoms. I informed the patient that she will be admitted to the hospital for further evaluation and observation.     4:56 PM- Paged Dr. Lancaster (general surgery) to discuss the patient's condition.    5:04 PM- Spoke to Dr. Black (on call for Dr. Lancaster) about the patient's condition who agrees to admit the patient.     This is a57-year-old female status post pancreectomy with no incisional infection/cellulitis    with slight underlying fluid as well as seroma in the intra-abdominal cavity. The patient was CT scanned for this finding is no evidence of significant abscess, the patient does not have necrotizing fasciitis. The patient received IV fluids, multiple rounds of Dilaudid pain medication and Ativan for her  anxiety, antibiotics in the form of Zosyn. I discussed the patient with Dr. Gillespieki will be consultation on the patient tomorrow and the patient will be admitted to the hospitalist for further evaluation and management. The patient does have evidence of a significant thrombocytosis and unknown etiology may be secondary to her current infection. With continued to monitor her platelets, the patient has no evidence of embolic lesion resulting in CVA.     DISPOSITION:  Patient will be admitted to Dr. hospitalist Dr. Allen for further evaluation and management.       FINAL IMPRESSION  Wound cellulitis  Chronic abdominal pain   intra-abdominal seroma       Jessica GHOSH (Kia), am scribing for, and in the presence of, Zacarias Scott D.O    Electronically signed by: Jessica Correia (Kia), 8/25/2017    Zacarias GHOSH D.O. personally performed the services described in this documentation, as scribed by Jessica Correia in my presence, and it is both accurate and complete.    The note accurately reflects work and decisions made by me.  Zacarais Scott  8/25/2017  7:50 PM

## 2017-08-25 NOTE — ED NOTES
"It is noted pt has [employer] ins.  Met with pt @ bedside, she confirms [employer] through Deirdre.  She states she has permission through [employer] to be treated @ [facility] by \"Dr. PELLETIER\" for pancreatic cancer, pancreatotomy  and any complications relating to same.  She states she has documentation to that effect.  No transfer will be pursued @ this time.  MD made aware.  "

## 2017-08-25 NOTE — ED NOTES
from Lab called with critical result of platelets at 1608. Critical lab result read back to lab.   Dr. toledo notified of critical lab result at 1610.  Critical lab result read back by Dr. toledo

## 2017-08-26 PROBLEM — R18.8 INTRA-ABDOMINAL FLUID COLLECTION: Status: ACTIVE | Noted: 2017-08-26

## 2017-08-26 LAB
AMYLASE FLD-CCNC: NORMAL U/L
ANION GAP SERPL CALC-SCNC: 12 MMOL/L (ref 0–11.9)
BASOPHILS # BLD AUTO: 0.8 % (ref 0–1.8)
BASOPHILS # BLD: 0.14 K/UL (ref 0–0.12)
BODY FLD TYPE: NORMAL
BUN SERPL-MCNC: 7 MG/DL (ref 8–22)
CALCIUM SERPL-MCNC: 8.4 MG/DL (ref 8.5–10.5)
CHLORIDE SERPL-SCNC: 101 MMOL/L (ref 96–112)
CO2 SERPL-SCNC: 22 MMOL/L (ref 20–33)
CREAT SERPL-MCNC: 0.45 MG/DL (ref 0.5–1.4)
EOSINOPHIL # BLD AUTO: 0.14 K/UL (ref 0–0.51)
EOSINOPHIL NFR BLD: 0.8 % (ref 0–6.9)
ERYTHROCYTE [DISTWIDTH] IN BLOOD BY AUTOMATED COUNT: 54.4 FL (ref 35.9–50)
GFR SERPL CREATININE-BSD FRML MDRD: >60 ML/MIN/1.73 M 2
GLUCOSE SERPL-MCNC: 79 MG/DL (ref 65–99)
HCT VFR BLD AUTO: 33.2 % (ref 37–47)
HGB BLD-MCNC: 10.5 G/DL (ref 12–16)
IMM GRANULOCYTES # BLD AUTO: 0.13 K/UL (ref 0–0.11)
IMM GRANULOCYTES NFR BLD AUTO: 0.7 % (ref 0–0.9)
LYMPHOCYTES # BLD AUTO: 2 K/UL (ref 1–4.8)
LYMPHOCYTES NFR BLD: 11 % (ref 22–41)
MCH RBC QN AUTO: 28.8 PG (ref 27–33)
MCHC RBC AUTO-ENTMCNC: 31.6 G/DL (ref 33.6–35)
MCV RBC AUTO: 91 FL (ref 81.4–97.8)
MONOCYTES # BLD AUTO: 2.06 K/UL (ref 0–0.85)
MONOCYTES NFR BLD AUTO: 11.3 % (ref 0–13.4)
NEUTROPHILS # BLD AUTO: 13.77 K/UL (ref 2–7.15)
NEUTROPHILS NFR BLD: 75.4 % (ref 44–72)
NRBC # BLD AUTO: 0 K/UL
NRBC BLD AUTO-RTO: 0 /100 WBC
PLATELET # BLD AUTO: 1260 K/UL (ref 164–446)
PMV BLD AUTO: 10.1 FL (ref 9–12.9)
POTASSIUM SERPL-SCNC: 3.1 MMOL/L (ref 3.6–5.5)
RBC # BLD AUTO: 3.65 M/UL (ref 4.2–5.4)
SODIUM SERPL-SCNC: 135 MMOL/L (ref 135–145)
WBC # BLD AUTO: 18.2 K/UL (ref 4.8–10.8)

## 2017-08-26 PROCEDURE — 80048 BASIC METABOLIC PNL TOTAL CA: CPT

## 2017-08-26 PROCEDURE — A9270 NON-COVERED ITEM OR SERVICE: HCPCS | Performed by: HOSPITALIST

## 2017-08-26 PROCEDURE — 700102 HCHG RX REV CODE 250 W/ 637 OVERRIDE(OP): Performed by: HOSPITALIST

## 2017-08-26 PROCEDURE — 700102 HCHG RX REV CODE 250 W/ 637 OVERRIDE(OP): Performed by: INTERNAL MEDICINE

## 2017-08-26 PROCEDURE — 700105 HCHG RX REV CODE 258: Performed by: HOSPITALIST

## 2017-08-26 PROCEDURE — A9270 NON-COVERED ITEM OR SERVICE: HCPCS | Performed by: INTERNAL MEDICINE

## 2017-08-26 PROCEDURE — 85025 COMPLETE CBC W/AUTO DIFF WBC: CPT

## 2017-08-26 PROCEDURE — 700111 HCHG RX REV CODE 636 W/ 250 OVERRIDE (IP): Performed by: HOSPITALIST

## 2017-08-26 PROCEDURE — 770006 HCHG ROOM/CARE - MED/SURG/GYN SEMI*

## 2017-08-26 PROCEDURE — 99233 SBSQ HOSP IP/OBS HIGH 50: CPT | Performed by: INTERNAL MEDICINE

## 2017-08-26 PROCEDURE — 36415 COLL VENOUS BLD VENIPUNCTURE: CPT

## 2017-08-26 PROCEDURE — 82150 ASSAY OF AMYLASE: CPT

## 2017-08-26 RX ORDER — ACETAMINOPHEN 325 MG/1
650 TABLET ORAL EVERY 4 HOURS PRN
Status: DISCONTINUED | OUTPATIENT
Start: 2017-08-26 | End: 2017-08-29 | Stop reason: HOSPADM

## 2017-08-26 RX ADMIN — MORPHINE SULFATE 2 MG: 4 INJECTION INTRAVENOUS at 04:44

## 2017-08-26 RX ADMIN — PIPERACILLIN SODIUM AND TAZOBACTAM SODIUM 4.5 G: 4; .5 INJECTION, POWDER, FOR SOLUTION INTRAVENOUS at 17:20

## 2017-08-26 RX ADMIN — LORAZEPAM 1 MG: 1 TABLET ORAL at 07:52

## 2017-08-26 RX ADMIN — LORAZEPAM 0.5 MG: 2 INJECTION INTRAMUSCULAR; INTRAVENOUS at 04:44

## 2017-08-26 RX ADMIN — KETOROLAC TROMETHAMINE 10 MG: 10 TABLET, FILM COATED ORAL at 04:45

## 2017-08-26 RX ADMIN — MORPHINE SULFATE 2 MG: 4 INJECTION INTRAVENOUS at 20:25

## 2017-08-26 RX ADMIN — SODIUM CHLORIDE: 9 INJECTION, SOLUTION INTRAVENOUS at 00:07

## 2017-08-26 RX ADMIN — OXYCODONE HYDROCHLORIDE 10 MG: 10 TABLET ORAL at 04:44

## 2017-08-26 RX ADMIN — PIPERACILLIN SODIUM AND TAZOBACTAM SODIUM 4.5 G: 4; .5 INJECTION, POWDER, FOR SOLUTION INTRAVENOUS at 11:22

## 2017-08-26 RX ADMIN — SODIUM CHLORIDE 1632 ML: 9 INJECTION, SOLUTION INTRAVENOUS at 00:00

## 2017-08-26 RX ADMIN — FLUOXETINE HYDROCHLORIDE 40 MG: 20 CAPSULE ORAL at 07:51

## 2017-08-26 RX ADMIN — PIPERACILLIN SODIUM AND TAZOBACTAM SODIUM 4.5 G: 4; .5 INJECTION, POWDER, FOR SOLUTION INTRAVENOUS at 06:00

## 2017-08-26 RX ADMIN — LORAZEPAM 1 MG: 1 TABLET ORAL at 20:12

## 2017-08-26 RX ADMIN — MORPHINE SULFATE 2 MG: 4 INJECTION INTRAVENOUS at 09:33

## 2017-08-26 RX ADMIN — SODIUM CHLORIDE: 9 INJECTION, SOLUTION INTRAVENOUS at 17:22

## 2017-08-26 RX ADMIN — ACETAMINOPHEN 650 MG: 325 TABLET, FILM COATED ORAL at 12:03

## 2017-08-26 RX ADMIN — POLYETHYLENE GLYCOL 3350 1 PACKET: 17 POWDER, FOR SOLUTION ORAL at 05:18

## 2017-08-26 RX ADMIN — PIPERACILLIN SODIUM AND TAZOBACTAM SODIUM 4.5 G: 4; .5 INJECTION, POWDER, FOR SOLUTION INTRAVENOUS at 00:00

## 2017-08-26 RX ADMIN — OXYCODONE HYDROCHLORIDE 10 MG: 10 TABLET ORAL at 12:04

## 2017-08-26 RX ADMIN — OXYCODONE HYDROCHLORIDE 10 MG: 10 TABLET ORAL at 07:51

## 2017-08-26 RX ADMIN — STANDARDIZED SENNA CONCENTRATE AND DOCUSATE SODIUM 2 TABLET: 8.6; 5 TABLET, FILM COATED ORAL at 07:51

## 2017-08-26 RX ADMIN — HEPARIN SODIUM 5000 UNITS: 5000 INJECTION, SOLUTION INTRAVENOUS; SUBCUTANEOUS at 06:00

## 2017-08-26 RX ADMIN — MAGNESIUM HYDROXIDE 30 ML: 400 SUSPENSION ORAL at 07:51

## 2017-08-26 RX ADMIN — PROCHLORPERAZINE EDISYLATE 10 MG: 5 INJECTION INTRAMUSCULAR; INTRAVENOUS at 06:05

## 2017-08-26 RX ADMIN — SODIUM CHLORIDE: 9 INJECTION, SOLUTION INTRAVENOUS at 07:58

## 2017-08-26 ASSESSMENT — ENCOUNTER SYMPTOMS
MYALGIAS: 1
WEIGHT LOSS: 0
TINGLING: 0
DIZZINESS: 0
LOSS OF CONSCIOUSNESS: 0
ABDOMINAL PAIN: 1
CHILLS: 1
COUGH: 0
DIARRHEA: 0
VOMITING: 0
HEADACHES: 0
DEPRESSION: 0
BLURRED VISION: 0
FEVER: 0
NAUSEA: 0
FOCAL WEAKNESS: 0
WEAKNESS: 1
NECK PAIN: 0

## 2017-08-26 ASSESSMENT — PAIN SCALES - GENERAL
PAINLEVEL_OUTOF10: 8
PAINLEVEL_OUTOF10: ASSUMED PAIN PRESENT
PAINLEVEL_OUTOF10: 10
PAINLEVEL_OUTOF10: 10

## 2017-08-26 ASSESSMENT — LIFESTYLE VARIABLES: SUBSTANCE_ABUSE: 0

## 2017-08-26 NOTE — CARE PLAN
Problem: Bowel/Gastric:  Goal: Normal bowel function is maintained or improved  Outcome: PROGRESSING AS EXPECTED  BLM noted today. +bowel sounds upon auscultation. IVF running. Encouraging PO intake. Pt to be NPO at midnight for procedure.     Problem: Pain Management  Goal: Pain level will decrease to patient's comfort goal  Outcome: PROGRESSING AS EXPECTED  PRN pain medication administered. Ice pack and cool wash cloth in use. Providing rest and quiet environment. Pain assessment implemented.

## 2017-08-26 NOTE — CONSULTS
DATE OF SERVICE:  08/26/2017    TIME:  0600    CHIEF COMPLAINT:  Consult for abdominal wall infection.    HISTORY OF PRESENT ILLNESS:  This 57-year-old female seen at the request of   the medical service.    We were covering her primary surgeon, Dr. Lancaster.    She underwent a pancreatic resection about 10 days ago, but comes into the   hospital now complaining of abdominal pain.  She has erythema and swelling and   an ultrasound shows findings consistent with a wound infection.  There does   not appear to be a fascial disruption.    PAST MEDICAL HISTORY AND OPERATIONS:  As above plus tonsillectomy,   cholecystectomy, shoulder arthroscopy, ankle surgery.    MEDICAL DISEASES:  Depression, anxiety, pancreatic cancer, narcotics   dependence.    MEDICATIONS:  Lorazepam, Prozac, oxycodone, and Toradol.    ALLERGIES:  None.    FAMILY HISTORY:  Negative for pancreatic cancer.    SOCIAL HISTORY:  The patient gave up smoking and is retired.    REVIEW OF SYSTEMS:  The patient is quite anxious.    PHYSICAL EXAMINATION:  VITAL SIGNS:  Temperature 98, blood pressure 117/81, pulse 100.  HEENT:  Without icterus.  Pupils equal, reactive to light and accommodation.  NECK:  Without masses, no JVD, no supraclavicular adenopathy, no thyromegaly.  CHEST:  Coarse breath sounds.  CARDIAC:  Auscultation unremarkable.  ABDOMEN:  Pendulous.  There is erythema and induration, swelling in the   superior aspect of her midline incision.  RECTAL AND GENITALIA:  Deferred.  EXTREMITIES:  2+ pulses.    IMPRESSION:  1.  Probable wound abscess, status post pancreatic resection.  2.  Pancreatic cancer.  3.  Chronic obstructive pulmonary disease.  4.  Depression.  5.  Cholecystectomy.  6.  Ankle fusion.  7.  Shoulder arthroscopy.  8.  Aortic aneurysm resection.    PLAN:  We will schedule her for incision and drainage in the operating room   for her comfort.    The risks have been explained and we will immediately look for operating room    time.    Thank you so much for this consultation.       ____________________________________     MD YVONNE OKEEFE / ADRIAN    DD:  08/26/2017 06:59:53  DT:  08/26/2017 07:13:04    D#:  [id number]  Job#:  [id number]

## 2017-08-26 NOTE — PROGRESS NOTES
Surgical Progress Note    Author: Calin Black Date & Time created: 2017   5:59 AM     Interval Events:  Postop wound infection s/p pancreatic resection by Dr.G ROA  Hemodynamics:  Temp (24hrs), Av.7 °C (99.8 °F), Min:37.2 °C (99 °F), Max:38.2 °C (100.8 °F)  Temperature: 37.4 °C (99.3 °F)  Pulse  Av.4  Min: 54  Max: 103Heart Rate (Monitored): 100  Blood Pressure: 129/90, NIBP: 141/70     Respiratory:    Respiration: 20, Pulse Oximetry: 96 %           Neuro:  GCS       Fluids:    Intake/Output Summary (Last 24 hours) at 17 0559  Last data filed at 17 0500   Gross per 24 hour   Intake             2500 ml   Output             1150 ml   Net             1350 ml     Weight: 58.5 kg (128 lb 15.5 oz)  Current Diet Order   Procedures   • Diet Order     Physical Exam  Labs:  Recent Results (from the past 24 hour(s))   LACTIC ACID    Collection Time: 17  2:10 PM   Result Value Ref Range    Lactic Acid 2.0 0.5 - 2.0 mmol/L   CBC WITH DIFFERENTIAL    Collection Time: 17  2:10 PM   Result Value Ref Range    WBC 21.4 (H) 4.8 - 10.8 K/uL    RBC 3.89 (L) 4.20 - 5.40 M/uL    Hemoglobin 11.2 (L) 12.0 - 16.0 g/dL    Hematocrit 34.0 (L) 37.0 - 47.0 %    MCV 87.4 81.4 - 97.8 fL    MCH 28.8 27.0 - 33.0 pg    MCHC 32.9 (L) 33.6 - 35.0 g/dL    RDW 50.4 (H) 35.9 - 50.0 fL    Platelet Count 1590 (HH) 164 - 446 K/uL    MPV 9.1 9.0 - 12.9 fL    Neutrophils-Polys 85.20 (H) 44.00 - 72.00 %    Lymphocytes 5.80 (L) 22.00 - 41.00 %    Monocytes 7.90 0.00 - 13.40 %    Eosinophils 0.20 0.00 - 6.90 %    Basophils 0.60 0.00 - 1.80 %    Immature Granulocytes 0.30 0.00 - 0.90 %    Nucleated RBC 0.00 /100 WBC    Neutrophils (Absolute) 18.22 (H) 2.00 - 7.15 K/uL    Lymphs (Absolute) 1.24 1.00 - 4.80 K/uL    Monos (Absolute) 1.68 (H) 0.00 - 0.85 K/uL    Eos (Absolute) 0.04 0.00 - 0.51 K/uL    Baso (Absolute) 0.13 (H) 0.00 - 0.12 K/uL    Immature Granulocytes (abs) 0.07 0.00 - 0.11 K/uL    NRBC (Absolute) 0.00 K/uL    COMP METABOLIC PANEL    Collection Time: 08/25/17  2:10 PM   Result Value Ref Range    Sodium 134 (L) 135 - 145 mmol/L    Potassium 3.2 (L) 3.6 - 5.5 mmol/L    Chloride 93 (L) 96 - 112 mmol/L    Co2 24 20 - 33 mmol/L    Anion Gap 17.0 (H) 0.0 - 11.9    Glucose 85 65 - 99 mg/dL    Bun 9 8 - 22 mg/dL    Creatinine 0.46 (L) 0.50 - 1.40 mg/dL    Calcium 9.1 8.5 - 10.5 mg/dL    AST(SGOT) 43 12 - 45 U/L    ALT(SGPT) 21 2 - 50 U/L    Alkaline Phosphatase 172 (H) 30 - 99 U/L    Total Bilirubin 0.6 0.1 - 1.5 mg/dL    Albumin 3.2 3.2 - 4.9 g/dL    Total Protein 6.7 6.0 - 8.2 g/dL    Globulin 3.5 1.9 - 3.5 g/dL    A-G Ratio 0.9 g/dL   ESTIMATED GFR    Collection Time: 08/25/17  2:10 PM   Result Value Ref Range    GFR If African American >60 >60 mL/min/1.73 m 2    GFR If Non African American >60 >60 mL/min/1.73 m 2   LACTIC ACID    Collection Time: 08/25/17  3:53 PM   Result Value Ref Range    Lactic Acid 1.5 0.5 - 2.0 mmol/L   CBC WITH DIFFERENTIAL    Collection Time: 08/25/17  3:53 PM   Result Value Ref Range    WBC 21.1 (H) 4.8 - 10.8 K/uL    RBC 3.73 (L) 4.20 - 5.40 M/uL    Hemoglobin 10.6 (L) 12.0 - 16.0 g/dL    Hematocrit 32.1 (L) 37.0 - 47.0 %    MCV 86.1 81.4 - 97.8 fL    MCH 28.4 27.0 - 33.0 pg    MCHC 33.0 (L) 33.6 - 35.0 g/dL    RDW 49.8 35.9 - 50.0 fL    Platelet Count 1493 (HH) 164 - 446 K/uL    MPV 9.1 9.0 - 12.9 fL    Neutrophils-Polys 81.30 (H) 44.00 - 72.00 %    Lymphocytes 8.00 (L) 22.00 - 41.00 %    Monocytes 9.40 0.00 - 13.40 %    Eosinophils 0.40 0.00 - 6.90 %    Basophils 0.40 0.00 - 1.80 %    Immature Granulocytes 0.50 0.00 - 0.90 %    Nucleated RBC 0.00 /100 WBC    Neutrophils (Absolute) 17.17 (H) 2.00 - 7.15 K/uL    Lymphs (Absolute) 1.68 1.00 - 4.80 K/uL    Monos (Absolute) 1.99 (H) 0.00 - 0.85 K/uL    Eos (Absolute) 0.08 0.00 - 0.51 K/uL    Baso (Absolute) 0.09 0.00 - 0.12 K/uL    Immature Granulocytes (abs) 0.11 0.00 - 0.11 K/uL    NRBC (Absolute) 0.00 K/uL   URINALYSIS    Collection Time: 08/25/17   5:10 PM   Result Value Ref Range    Micro Urine Req see below     Color Yellow     Character Clear     Ph 5.5 5.0 - 8.0    Glucose Negative Negative mg/dL    Ketones >=160 Negative mg/dL    Protein Negative Negative mg/dL    Bilirubin Negative Negative    Urobilinogen, Urine 0.2 Negative    Nitrite Negative Negative    Leukocyte Esterase Negative Negative    Occult Blood Negative Negative   REFRACTOMETER SG    Collection Time: 08/25/17  5:10 PM   Result Value Ref Range    Specific Gravity >1.035 (A)    CBC WITH DIFFERENTIAL    Collection Time: 08/26/17  3:18 AM   Result Value Ref Range    WBC 18.2 (H) 4.8 - 10.8 K/uL    RBC 3.65 (L) 4.20 - 5.40 M/uL    Hemoglobin 10.5 (L) 12.0 - 16.0 g/dL    Hematocrit 33.2 (L) 37.0 - 47.0 %    MCV 91.0 81.4 - 97.8 fL    MCH 28.8 27.0 - 33.0 pg    MCHC 31.6 (L) 33.6 - 35.0 g/dL    RDW 54.4 (H) 35.9 - 50.0 fL    Platelet Count 1260 (HH) 164 - 446 K/uL    MPV 10.1 9.0 - 12.9 fL    Neutrophils-Polys 75.40 (H) 44.00 - 72.00 %    Lymphocytes 11.00 (L) 22.00 - 41.00 %    Monocytes 11.30 0.00 - 13.40 %    Eosinophils 0.80 0.00 - 6.90 %    Basophils 0.80 0.00 - 1.80 %    Immature Granulocytes 0.70 0.00 - 0.90 %    Nucleated RBC 0.00 /100 WBC    Neutrophils (Absolute) 13.77 (H) 2.00 - 7.15 K/uL    Lymphs (Absolute) 2.00 1.00 - 4.80 K/uL    Monos (Absolute) 2.06 (H) 0.00 - 0.85 K/uL    Eos (Absolute) 0.14 0.00 - 0.51 K/uL    Baso (Absolute) 0.14 (H) 0.00 - 0.12 K/uL    Immature Granulocytes (abs) 0.13 (H) 0.00 - 0.11 K/uL    NRBC (Absolute) 0.00 K/uL   BASIC METABOLIC PANEL    Collection Time: 08/26/17  3:18 AM   Result Value Ref Range    Sodium 135 135 - 145 mmol/L    Potassium 3.1 (L) 3.6 - 5.5 mmol/L    Chloride 101 96 - 112 mmol/L    Co2 22 20 - 33 mmol/L    Glucose 79 65 - 99 mg/dL    Bun 7 (L) 8 - 22 mg/dL    Creatinine 0.45 (L) 0.50 - 1.40 mg/dL    Calcium 8.4 (L) 8.5 - 10.5 mg/dL    Anion Gap 12.0 (H) 0.0 - 11.9   ESTIMATED GFR    Collection Time: 08/26/17  3:18 AM   Result Value Ref  Range    GFR If African American >60 >60 mL/min/1.73 m 2    GFR If Non African American >60 >60 mL/min/1.73 m 2     Medical Decision Making, by Problem:  Active Hospital Problems    Diagnosis   • Sepsis (CMS-HCC) [A41.9]   • Hypokalemia [E87.6]   • Hyponatremia [E87.1]   • Pancreatic cancer (CMS-[facility]) [C25.9]   • Thrombocytosis (CMS-[facility]) [D47.3]   • Abdominal wall cellulitis [L03.311]     Plan:  Will schedule I and D    Quality Measures:  Core Measures    Discussed patient condition with RN

## 2017-08-26 NOTE — PROGRESS NOTES
Lissy FRAZIER from Lab called with critical result of platlet 1260 at 0425. Critical lab result read back to Beatriz.   Olivia Higgins, hospitalist, notified of critical lab result at 0510.  Critical lab result read back by ENEDELIA Higgins.

## 2017-08-26 NOTE — PROGRESS NOTES
Pt AAOx4. C/o pain at 8/10. Medicated per MAR.   Low grade temp of 101. Will discuss with hospitalist.   Healing prior midline incision, warm to touch, red, firm at top, and steri strips noted. Tender to pt.   - flatus per pt, LBM pta.  Pt up standby assist to bathroom.   Pt on room air saturating at 92%.   Encouraged pt to ambulate with staff in hallway today.   Call light within reach, bed alarm in place. Encouraged pt to call for assistance.   Will continue to monitor.

## 2017-08-26 NOTE — PROGRESS NOTES
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

## 2017-08-26 NOTE — PROGRESS NOTES
Dr. Marin notified of incision status. Informed pt has been running low grade fevers. Dr. Marin recommending to cover wound with gauze at this time. Pt's room number provided. No other orders received.

## 2017-08-26 NOTE — PROGRESS NOTES
Pt up to restroom. Midline incision with large amount of tan drainage. Dr. Sibley at bedside recommending to page surgery to come look at incision. Dr. Self not on call at this time. Dr. Marin paged.

## 2017-08-26 NOTE — PROGRESS NOTES
Assumed care at 2100     Pt is A&O x 4.  Pain 10/10.  Addressed per MAR  Pain response out of proportion to dx and assessment.  Pt. Whimpers and moans constantly, she yelps and shakes when given the heparin admin. SQ to lateral abdomen even though care was taken to avoid the sore and red areas.  Nausea confirms Addressed per MAR  Tolerating Diet: clear liquid, pt states she hasn't been able to eat recently, does request a sprite and this was retrieved for her  Wound:  Midline abdominal incision, old with steri stripes, yellow crusty scab covering it.  Surrounding skin red, hot, indurated, painful    Drainage none  Urine output: yes  LBM pt. States pta about 5 days ago.    Senna given as scheduled   Flatus: no     standby assist to transfer  Mobility:  Up to bathroom  SCD's on   on    Bed alarm on  Provided pt. And her room mate with quiet kits  Showed pt how to work the TV to watch movies, encouraged distraction technique to help with pain control.  Pt. Refuses to do anything for herself.  Even after I showed her how to select a movie, I still had to do it for her.   Bed in lowest position and locked.  Upper side rails up to assist pt with positioning.      Pt resting comfortably now.  Review plan of care with patient  Call light within reach  Hourly rounds in place  All needs met at this time

## 2017-08-26 NOTE — H&P
DATE OF ADMISSION:  08/25/2017    CHIEF COMPLAINT:  Abdominal pain.    GASTROENTEROLOGIST:  Thony Harley MD    ONCOLOGIST:  Gera Lancaster MD    HISTORY OF PRESENT ILLNESS:  Patient is a 57-year-old female that has a past   medical history of chronic pain and recent diagnosis of pancreatic cancer,   status post resection.  She has undergone 3 cycles of chemotherapy and she   decided that she does not want any further chemotherapy.  Nonetheless, she had   surgery done in the recent past and she presents to the hospital today   complaining of severe pain in her abdomen and redness and fevers and chills.    She is also complaining of decreased appetite and abdominal distention.  She   saw Dr. Lancaster a couple of days ago and at that time her incision site   felt fine and it looked normal according to the patient, but over the past   couple of days, she has noticed some severe redness and swelling.  She notes 2   bad bumps and it has become quite tender.  In the emergency department, she   is found to have a probable sepsis and the fluid collection beneath the   incision site.    REVIEW OF SYSTEMS:  As per HPI.  All other systems have been reviewed and are   negative.    ALLERGIES:  No known drug allergies.    PAST MEDICAL HISTORY:  1.  Chronic pain and narcotic dependence.  2.  Depression.  3.  Anxiety.  4.  Pancreatic cancer.    PAST SURGICAL HISTORY:  1.  She had a pancreatectomy recently as well as a splenectomy and lymph node   dissection and also an abdominal aortic aneurysm repair.  2.  History of tonsillectomy.  3.  Ankle surgery.  4.  Cholecystectomy.  5.  Shoulder surgery.    SOCIAL HISTORY:  She smoked 2 packs per day for about 15 years, but quit about   10 years ago.  She denies alcohol, occasional marijuana use.    FAMILY HISTORY:  Has been reviewed and is not pertinent to her current   presentation.    HOME MEDICATIONS:  1.  Lorazepam as needed.  2.  Prozac 40 mg daily.  3.  Oxycodone as  needed.  4.  Toradol as needed.    PHYSICAL EXAMINATION:  VITAL SIGNS:  Blood pressure is 117/81, a pulse of 102, respirations are 20,   temperature 37.8, oxygen saturation 97% on 2 L nasal cannula.  Weight 58.5   kilograms.  GENERAL:  Patient appears chronically ill and is in moderate distress   secondary to abdominal discomfort.  HEENT:  Dry mucous membranes.  No oropharyngeal exudates.  Eyes, EOMI, PERRLA.  NECK:  No lymphadenopathy, no JVD.  CARDIOVASCULAR:  Tachycardic, regular rhythm, no murmurs.  LUNGS:  Clear to auscultation bilaterally.  No rales or rhonchi.  ABDOMEN:  Positive bowel sounds, soft.  Tenderness to palpation across her   epigastric area and around her incision site.  There is erythema around a   vertical incision site with what appears to be 2 fluid collections underneath   the incision site.  EXTREMITIES:  No clubbing, cyanosis, or edema.  NEUROLOGIC:  Awake, alert, and oriented to person, place, time, and situation.    LABORATORY DATA:  WBC 21.1, hemoglobin 10.6, hematocrit 32.1, platelets are   1493.  Sodium 134, potassium 3.2, BUN 9, creatinine is 0.46.  Rest of CMP is   unremarkable.  Lactic acid is 2.    ASSESSMENT AND PLAN:  Patient is a 57-year-old female with history of   pancreatic cancer, status post recent resection, presents to the hospital   complaining of fevers, chills, and severe abdominal pain and redness.  1.  Sepsis.  She fits sepsis criteria.  She has a leukocytosis and has been   tachycardic.  Source is going to be from a cellulitis and possibly   intraabdominally.  She does have 2 fluid collections which may be a seroma or   an abscess.  Nonetheless, Dr. Black with surgery will consult on the   patient.  She will be kept n.p.o.  We will treat her with sepsis protocol with   IV fluids and IV Zosyn therapy.  We will follow up with culture results as   well.  2.  Pancreatic cancer.  She stated that she does not want anymore   chemotherapy.  3.  Chronic pain and acute  pain.  We will treat her with p.r.n. oral and IV   pain medications and Ativan for any agitation.  4.  Deep venous thrombosis prophylaxis, heparin 5000 units t.i.d.  5.  Depression.  Continue Prozac 40 mg daily.  6.  She is a full code.  7.  Hypochloremic hyponatremia.  We will treat her with IV fluids and recheck   labs.  8.  Hypokalemia.  We will replenish this with IV potassium.       ____________________________________     MD TUCKER Medrano / ADRIAN    DD:  08/25/2017 21:30:27  DT:  08/25/2017 21:50:29    D#:  [id number]  Job#:  [id number]

## 2017-08-26 NOTE — ED NOTES
Primary RN assist: pt calls for update on POC. ERP walks in to room to discuss results and POC to admit and surgeon on call to see pt in hospital tomorrow.

## 2017-08-27 ENCOUNTER — APPOINTMENT (OUTPATIENT)
Dept: RADIOLOGY | Facility: MEDICAL CENTER | Age: 57
DRG: 856 | End: 2017-08-27
Attending: ANESTHESIOLOGY
Payer: COMMERCIAL

## 2017-08-27 LAB
ANION GAP SERPL CALC-SCNC: 11 MMOL/L (ref 0–11.9)
BACTERIA UR CULT: NORMAL
BASOPHILS # BLD AUTO: 0.5 % (ref 0–1.8)
BASOPHILS # BLD: 0.07 K/UL (ref 0–0.12)
BUN SERPL-MCNC: 2 MG/DL (ref 8–22)
CALCIUM SERPL-MCNC: 8.5 MG/DL (ref 8.5–10.5)
CHLORIDE SERPL-SCNC: 102 MMOL/L (ref 96–112)
CO2 SERPL-SCNC: 26 MMOL/L (ref 20–33)
COMMENT 1642: NORMAL
CREAT SERPL-MCNC: 0.49 MG/DL (ref 0.5–1.4)
EOSINOPHIL # BLD AUTO: 0.2 K/UL (ref 0–0.51)
EOSINOPHIL NFR BLD: 1.5 % (ref 0–6.9)
EPO SERPL-ACNC: 35 MU/ML (ref 4–27)
ERYTHROCYTE [DISTWIDTH] IN BLOOD BY AUTOMATED COUNT: 51.9 FL (ref 35.9–50)
GFR SERPL CREATININE-BSD FRML MDRD: >60 ML/MIN/1.73 M 2
GLUCOSE SERPL-MCNC: 114 MG/DL (ref 65–99)
GRAM STN SPEC: NORMAL
HCT VFR BLD AUTO: 30 % (ref 37–47)
HGB BLD-MCNC: 9.8 G/DL (ref 12–16)
IMM GRANULOCYTES # BLD AUTO: 0.08 K/UL (ref 0–0.11)
IMM GRANULOCYTES NFR BLD AUTO: 0.6 % (ref 0–0.9)
LYMPHOCYTES # BLD AUTO: 1.6 K/UL (ref 1–4.8)
LYMPHOCYTES NFR BLD: 11.9 % (ref 22–41)
MAGNESIUM SERPL-MCNC: 1.9 MG/DL (ref 1.5–2.5)
MCH RBC QN AUTO: 28.7 PG (ref 27–33)
MCHC RBC AUTO-ENTMCNC: 32.7 G/DL (ref 33.6–35)
MCV RBC AUTO: 87.7 FL (ref 81.4–97.8)
MONOCYTES # BLD AUTO: 1.77 K/UL (ref 0–0.85)
MONOCYTES NFR BLD AUTO: 13.1 % (ref 0–13.4)
MORPHOLOGY BLD-IMP: NORMAL
NEUTROPHILS # BLD AUTO: 9.75 K/UL (ref 2–7.15)
NEUTROPHILS NFR BLD: 72.4 % (ref 44–72)
NRBC # BLD AUTO: 0 K/UL
NRBC BLD AUTO-RTO: 0 /100 WBC
PLATELET # BLD AUTO: 1401 K/UL (ref 164–446)
PMV BLD AUTO: 9.2 FL (ref 9–12.9)
POTASSIUM SERPL-SCNC: 2.7 MMOL/L (ref 3.6–5.5)
RBC # BLD AUTO: 3.42 M/UL (ref 4.2–5.4)
SIGNIFICANT IND 70042: NORMAL
SIGNIFICANT IND 70042: NORMAL
SITE SITE: NORMAL
SITE SITE: NORMAL
SODIUM SERPL-SCNC: 139 MMOL/L (ref 135–145)
SOURCE SOURCE: NORMAL
SOURCE SOURCE: NORMAL
WBC # BLD AUTO: 13.5 K/UL (ref 4.8–10.8)

## 2017-08-27 PROCEDURE — 87075 CULTR BACTERIA EXCEPT BLOOD: CPT

## 2017-08-27 PROCEDURE — 85025 COMPLETE CBC W/AUTO DIFF WBC: CPT

## 2017-08-27 PROCEDURE — 87205 SMEAR GRAM STAIN: CPT

## 2017-08-27 PROCEDURE — 700111 HCHG RX REV CODE 636 W/ 250 OVERRIDE (IP): Performed by: NURSE PRACTITIONER

## 2017-08-27 PROCEDURE — A9270 NON-COVERED ITEM OR SERVICE: HCPCS | Performed by: HOSPITALIST

## 2017-08-27 PROCEDURE — 501838 HCHG SUTURE GENERAL: Performed by: SURGERY

## 2017-08-27 PROCEDURE — A9270 NON-COVERED ITEM OR SERVICE: HCPCS

## 2017-08-27 PROCEDURE — 700111 HCHG RX REV CODE 636 W/ 250 OVERRIDE (IP): Performed by: INTERNAL MEDICINE

## 2017-08-27 PROCEDURE — 160036 HCHG PACU - EA ADDL 30 MINS PHASE I: Performed by: SURGERY

## 2017-08-27 PROCEDURE — 160028 HCHG SURGERY MINUTES - 1ST 30 MINS LEVEL 3: Performed by: SURGERY

## 2017-08-27 PROCEDURE — 160039 HCHG SURGERY MINUTES - EA ADDL 1 MIN LEVEL 3: Performed by: SURGERY

## 2017-08-27 PROCEDURE — A9270 NON-COVERED ITEM OR SERVICE: HCPCS | Performed by: INTERNAL MEDICINE

## 2017-08-27 PROCEDURE — 700111 HCHG RX REV CODE 636 W/ 250 OVERRIDE (IP)

## 2017-08-27 PROCEDURE — 700105 HCHG RX REV CODE 258: Performed by: HOSPITALIST

## 2017-08-27 PROCEDURE — 700102 HCHG RX REV CODE 250 W/ 637 OVERRIDE(OP)

## 2017-08-27 PROCEDURE — 160035 HCHG PACU - 1ST 60 MINS PHASE I: Performed by: SURGERY

## 2017-08-27 PROCEDURE — 0W9F00Z DRAINAGE OF ABDOMINAL WALL WITH DRAINAGE DEVICE, OPEN APPROACH: ICD-10-PCS | Performed by: SURGERY

## 2017-08-27 PROCEDURE — 99232 SBSQ HOSP IP/OBS MODERATE 35: CPT | Performed by: INTERNAL MEDICINE

## 2017-08-27 PROCEDURE — 700111 HCHG RX REV CODE 636 W/ 250 OVERRIDE (IP): Performed by: HOSPITALIST

## 2017-08-27 PROCEDURE — 700102 HCHG RX REV CODE 250 W/ 637 OVERRIDE(OP): Performed by: HOSPITALIST

## 2017-08-27 PROCEDURE — 83735 ASSAY OF MAGNESIUM: CPT

## 2017-08-27 PROCEDURE — 80048 BASIC METABOLIC PNL TOTAL CA: CPT

## 2017-08-27 PROCEDURE — 501445 HCHG STAPLER, SKIN DISP: Performed by: SURGERY

## 2017-08-27 PROCEDURE — 160009 HCHG ANES TIME/MIN: Performed by: SURGERY

## 2017-08-27 PROCEDURE — 160048 HCHG OR STATISTICAL LEVEL 1-5: Performed by: SURGERY

## 2017-08-27 PROCEDURE — 500371 HCHG DRAIN, BLAKE 10MM: Performed by: SURGERY

## 2017-08-27 PROCEDURE — 36415 COLL VENOUS BLD VENIPUNCTURE: CPT

## 2017-08-27 PROCEDURE — 160002 HCHG RECOVERY MINUTES (STAT): Performed by: SURGERY

## 2017-08-27 PROCEDURE — 770006 HCHG ROOM/CARE - MED/SURG/GYN SEMI*

## 2017-08-27 PROCEDURE — 500389 HCHG DRAIN, RESERVOIR SUCT JP 100CC: Performed by: SURGERY

## 2017-08-27 PROCEDURE — 700102 HCHG RX REV CODE 250 W/ 637 OVERRIDE(OP): Performed by: INTERNAL MEDICINE

## 2017-08-27 PROCEDURE — 71010 DX-CHEST-PORTABLE (1 VIEW): CPT

## 2017-08-27 PROCEDURE — 87070 CULTURE OTHR SPECIMN AEROBIC: CPT

## 2017-08-27 RX ORDER — POTASSIUM CHLORIDE 7.45 MG/ML
10 INJECTION INTRAVENOUS
Status: COMPLETED | OUTPATIENT
Start: 2017-08-27 | End: 2017-08-27

## 2017-08-27 RX ORDER — MIDAZOLAM HYDROCHLORIDE 1 MG/ML
INJECTION INTRAMUSCULAR; INTRAVENOUS
Status: DISPENSED
Start: 2017-08-27 | End: 2017-08-28

## 2017-08-27 RX ORDER — OXYCODONE HCL 5 MG/5 ML
SOLUTION, ORAL ORAL
Status: COMPLETED
Start: 2017-08-27 | End: 2017-08-27

## 2017-08-27 RX ADMIN — PIPERACILLIN SODIUM AND TAZOBACTAM SODIUM 4.5 G: 4; .5 INJECTION, POWDER, FOR SOLUTION INTRAVENOUS at 18:06

## 2017-08-27 RX ADMIN — OXYCODONE HYDROCHLORIDE 10 MG: 10 TABLET ORAL at 20:11

## 2017-08-27 RX ADMIN — MORPHINE SULFATE 2 MG: 4 INJECTION INTRAVENOUS at 06:09

## 2017-08-27 RX ADMIN — POTASSIUM CHLORIDE 10 MEQ: 10 INJECTION, SOLUTION INTRAVENOUS at 10:46

## 2017-08-27 RX ADMIN — HYDROMORPHONE HYDROCHLORIDE 0.2 MG: 1 INJECTION, SOLUTION INTRAMUSCULAR; INTRAVENOUS; SUBCUTANEOUS at 14:25

## 2017-08-27 RX ADMIN — OXYCODONE HYDROCHLORIDE 5 MG: 5 SOLUTION ORAL at 14:30

## 2017-08-27 RX ADMIN — SODIUM CHLORIDE: 9 INJECTION, SOLUTION INTRAVENOUS at 16:20

## 2017-08-27 RX ADMIN — POTASSIUM CHLORIDE 10 MEQ: 10 INJECTION, SOLUTION INTRAVENOUS at 09:04

## 2017-08-27 RX ADMIN — OXYCODONE HYDROCHLORIDE 10 MG: 10 TABLET ORAL at 09:58

## 2017-08-27 RX ADMIN — LORAZEPAM 1 MG: 1 TABLET ORAL at 09:05

## 2017-08-27 RX ADMIN — POTASSIUM CHLORIDE 10 MEQ: 10 INJECTION, SOLUTION INTRAVENOUS at 04:35

## 2017-08-27 RX ADMIN — LORAZEPAM 1 MG: 1 TABLET ORAL at 20:11

## 2017-08-27 RX ADMIN — HYDROMORPHONE HYDROCHLORIDE 0.4 MG: 1 INJECTION, SOLUTION INTRAMUSCULAR; INTRAVENOUS; SUBCUTANEOUS at 14:42

## 2017-08-27 RX ADMIN — FLUOXETINE HYDROCHLORIDE 40 MG: 20 CAPSULE ORAL at 09:05

## 2017-08-27 RX ADMIN — SODIUM CHLORIDE: 9 INJECTION, SOLUTION INTRAVENOUS at 04:37

## 2017-08-27 RX ADMIN — PIPERACILLIN SODIUM AND TAZOBACTAM SODIUM 4.5 G: 4; .5 INJECTION, POWDER, FOR SOLUTION INTRAVENOUS at 00:26

## 2017-08-27 RX ADMIN — PIPERACILLIN SODIUM AND TAZOBACTAM SODIUM 4.5 G: 4; .5 INJECTION, POWDER, FOR SOLUTION INTRAVENOUS at 06:03

## 2017-08-27 RX ADMIN — MORPHINE SULFATE 2 MG: 4 INJECTION INTRAVENOUS at 00:28

## 2017-08-27 RX ADMIN — ACETAMINOPHEN 650 MG: 325 TABLET, FILM COATED ORAL at 00:27

## 2017-08-27 RX ADMIN — MORPHINE SULFATE 2 MG: 4 INJECTION INTRAVENOUS at 09:08

## 2017-08-27 RX ADMIN — HYDROMORPHONE HYDROCHLORIDE 0.2 MG: 1 INJECTION, SOLUTION INTRAMUSCULAR; INTRAVENOUS; SUBCUTANEOUS at 15:00

## 2017-08-27 RX ADMIN — HYDROMORPHONE HYDROCHLORIDE 0.2 MG: 1 INJECTION, SOLUTION INTRAMUSCULAR; INTRAVENOUS; SUBCUTANEOUS at 14:33

## 2017-08-27 RX ADMIN — HYDROMORPHONE HYDROCHLORIDE 0.5 MG: 1 INJECTION, SOLUTION INTRAMUSCULAR; INTRAVENOUS; SUBCUTANEOUS at 18:10

## 2017-08-27 RX ADMIN — POTASSIUM CHLORIDE 10 MEQ: 10 INJECTION, SOLUTION INTRAVENOUS at 06:44

## 2017-08-27 ASSESSMENT — ENCOUNTER SYMPTOMS
COUGH: 0
LOSS OF CONSCIOUSNESS: 0
VOMITING: 0
PALPITATIONS: 0
FEVER: 0
NECK PAIN: 0
FOCAL WEAKNESS: 0
DEPRESSION: 0
ABDOMINAL PAIN: 1
WEAKNESS: 1
DIARRHEA: 0
MYALGIAS: 1
WEIGHT LOSS: 0
BLURRED VISION: 0
TINGLING: 0
CHILLS: 1
DIZZINESS: 0
NAUSEA: 0

## 2017-08-27 ASSESSMENT — PAIN SCALES - GENERAL
PAINLEVEL_OUTOF10: 9
PAINLEVEL_OUTOF10: 9
PAINLEVEL_OUTOF10: 8
PAINLEVEL_OUTOF10: 10
PAINLEVEL_OUTOF10: 7
PAINLEVEL_OUTOF10: 0
PAINLEVEL_OUTOF10: 0
PAINLEVEL_OUTOF10: 9
PAINLEVEL_OUTOF10: 9
PAINLEVEL_OUTOF10: 6
PAINLEVEL_OUTOF10: 0
PAINLEVEL_OUTOF10: 0
PAINLEVEL_OUTOF10: 8
PAINLEVEL_OUTOF10: 9
PAINLEVEL_OUTOF10: 6
PAINLEVEL_OUTOF10: 8
PAINLEVEL_OUTOF10: 0

## 2017-08-27 NOTE — ASSESSMENT & PLAN NOTE
-This is sepsis (without associated acute organ dysfunction).   -2/2 intra abdominal fluid collection, improving, as outlined above

## 2017-08-27 NOTE — PROGRESS NOTES
Olivia VOGT notified of critical lab result at platelet 1401.  Critical lab result read back by Olivia VOGT. No new order received. Will continue to monitor closely.

## 2017-08-27 NOTE — ASSESSMENT & PLAN NOTE
-very high, 2/2 concurrent infection, monitor closely, slowly down trending, related to infection and large stress reaction from recent large abdominal surgery

## 2017-08-27 NOTE — PROGRESS NOTES
Surgical Progress Note    Author: Calin Black Date & Time created: 2017   5:09 AM     Interval Events:  Abdominal wall abscess  ROS  Hemodynamics:  Temp (24hrs), Av.3 °C (99.2 °F), Min:36.1 °C (97 °F), Max:38.3 °C (101 °F)  Temperature: 36.4 °C (97.5 °F)  Pulse  Av.9  Min: 54  Max: 103   Blood Pressure: 112/69     Respiratory:    Respiration: 16, Pulse Oximetry: 92 %        RUL Breath Sounds: Clear, RML Breath Sounds: Clear, RLL Breath Sounds: Diminished, CURTIS Breath Sounds: Clear, LLL Breath Sounds: Diminished  Neuro:  GCS       Fluids:    Intake/Output Summary (Last 24 hours) at 17 0503  Last data filed at 17 0443   Gross per 24 hour   Intake             2740 ml   Output              250 ml   Net             2490 ml        Current Diet Order   Procedures   • DIET NPO     Physical Exam  Labs:  Recent Results (from the past 24 hour(s))   FLUID AMYLASE    Collection Time: 17  2:00 PM   Result Value Ref Range    Fluid Type Abscess     Body Fluid Amylase 20375 U/L   CBC WITH DIFFERENTIAL    Collection Time: 17  2:51 AM   Result Value Ref Range    WBC 13.5 (H) 4.8 - 10.8 K/uL    RBC 3.42 (L) 4.20 - 5.40 M/uL    Hemoglobin 9.8 (L) 12.0 - 16.0 g/dL    Hematocrit 30.0 (L) 37.0 - 47.0 %    MCV 87.7 81.4 - 97.8 fL    MCH 28.7 27.0 - 33.0 pg    MCHC 32.7 (L) 33.6 - 35.0 g/dL    RDW 51.9 (H) 35.9 - 50.0 fL    Platelet Count 1401 (HH) 164 - 446 K/uL    MPV 9.2 9.0 - 12.9 fL    Neutrophils-Polys 72.40 (H) 44.00 - 72.00 %    Lymphocytes 11.90 (L) 22.00 - 41.00 %    Monocytes 13.10 0.00 - 13.40 %    Eosinophils 1.50 0.00 - 6.90 %    Basophils 0.50 0.00 - 1.80 %    Immature Granulocytes 0.60 0.00 - 0.90 %    Nucleated RBC 0.00 /100 WBC    Lymphs (Absolute) 1.60 1.00 - 4.80 K/uL    Monos (Absolute) 1.77 (H) 0.00 - 0.85 K/uL    Eos (Absolute) 0.20 0.00 - 0.51 K/uL    Baso (Absolute) 0.07 0.00 - 0.12 K/uL    Immature Granulocytes (abs) 0.08 0.00 - 0.11 K/uL    NRBC (Absolute) 0.00 K/uL     Neutrophils (Absolute) 9.75 (H) 2.00 - 7.15 K/uL   BASIC METABOLIC PANEL    Collection Time: 08/27/17  2:51 AM   Result Value Ref Range    Sodium 139 135 - 145 mmol/L    Potassium 2.7 (LL) 3.6 - 5.5 mmol/L    Chloride 102 96 - 112 mmol/L    Co2 26 20 - 33 mmol/L    Glucose 114 (H) 65 - 99 mg/dL    Bun 2 (L) 8 - 22 mg/dL    Creatinine 0.49 (L) 0.50 - 1.40 mg/dL    Calcium 8.5 8.5 - 10.5 mg/dL    Anion Gap 11.0 0.0 - 11.9   MAGNESIUM    Collection Time: 08/27/17  2:51 AM   Result Value Ref Range    Magnesium 1.9 1.5 - 2.5 mg/dL   PERIPHERAL SMEAR REVIEW    Collection Time: 08/27/17  2:51 AM   Result Value Ref Range    Peripheral Smear Review see below    DIFFERENTIAL COMMENT    Collection Time: 08/27/17  2:51 AM   Result Value Ref Range    Comments-Diff see below    ESTIMATED GFR    Collection Time: 08/27/17  2:51 AM   Result Value Ref Range    GFR If African American >60 >60 mL/min/1.73 m 2    GFR If Non African American >60 >60 mL/min/1.73 m 2     Medical Decision Making, by Problem:  Active Hospital Problems    Diagnosis   • Intra-abdominal fluid collection [R18.8]   • Sepsis (CMS-[facility]) [A41.9]   • Hypokalemia [E87.6]   • Hyponatremia [E87.1]   • Pancreatic cancer (CMS-[facility]) [C25.9]   • Thrombocytosis (CMS-[facility]) [D47.3]   • Abdominal wall cellulitis [L03.311]     Plan:  Drainage today    Quality Measures:  Core Measures

## 2017-08-27 NOTE — CARE PLAN
Problem: Safety  Goal: Will remain free from falls  Outcome: PROGRESSING AS EXPECTED  Bed alarm in use. Pt calls for assistance. Call light within reach. Bed locked and in lowest position.       Problem: Pain Management  Goal: Pain level will decrease to patient's comfort goal  Outcome: PROGRESSING AS EXPECTED  Pain controlled with PRN morphine 2mg IV.

## 2017-08-27 NOTE — PROGRESS NOTES
[facility] Hospitalist Progress Note    Date of Service: 2017    Chief Complaint  57 y.o. female admitted 2017 with abdominal cellulitis and intra abdominal fluid collections    Interval Problem Update  Abdominal issues-plan for ID tomorrow. Large amount of serous fluid spontaneously pushed through today. C/F gastrocutaneous fistula. Gauze placed. Intense pain per patient and her appetite remains poor.    Consultants/Specialty  -General surgery    Disposition  Surgical floor for now        Review of Systems   Constitutional: Positive for chills and malaise/fatigue. Negative for fever and weight loss.   Eyes: Negative for blurred vision.   Respiratory: Negative for cough.    Cardiovascular: Negative for chest pain and leg swelling.   Gastrointestinal: Positive for abdominal pain. Negative for diarrhea, nausea and vomiting.   Genitourinary: Negative for dysuria.   Musculoskeletal: Positive for myalgias. Negative for neck pain.   Skin: Positive for rash.   Neurological: Positive for weakness. Negative for dizziness, tingling, focal weakness, loss of consciousness and headaches.   Psychiatric/Behavioral: Negative for depression, substance abuse and suicidal ideas.   All other systems reviewed and are negative.     Physical Exam  Laboratory/Imaging   Hemodynamics  Temp (24hrs), Av.6 °C (99.7 °F), Min:36.1 °C (97 °F), Max:38.3 °C (101 °F)   Temperature: 36.1 °C (97 °F)  Pulse  Av.7  Min: 80  Max: 103    Blood Pressure: 130/86      Respiratory      Respiration: 17, Pulse Oximetry: 91 %        RUL Breath Sounds: Clear, RML Breath Sounds: Clear, RLL Breath Sounds: Clear, CURTIS Breath Sounds: Clear, LLL Breath Sounds: Clear    Fluids    Intake/Output Summary (Last 24 hours) at 17  Last data filed at 17 1600   Gross per 24 hour   Intake             3840 ml   Output             1400 ml   Net             2440 ml       Nutrition  Orders Placed This Encounter   Procedures   • Diet Order     Standing  Status:   Standing     Number of Occurrences:   1     Order Specific Question:   Diet:     Answer:   Clear Liquid [10]   • DIET NPO     Standing Status:   Standing     Number of Occurrences:   8     Order Specific Question:   Restrict to:     Answer:   Sips with Medications [3]     Physical Exam   Constitutional: She is oriented to person, place, and time. She appears well-developed and well-nourished. No distress.   HENT:   Head: Normocephalic and atraumatic.   Mouth/Throat: No oropharyngeal exudate.   Eyes: Pupils are equal, round, and reactive to light. No scleral icterus.   Neck: Normal range of motion. Neck supple. No thyromegaly present.   Cardiovascular: Normal rate, regular rhythm, normal heart sounds and intact distal pulses.    No murmur heard.  Pulmonary/Chest: Effort normal and breath sounds normal. No respiratory distress.   Abdominal: Soft. She exhibits no distension. There is tenderness.   Large mid line vertical abdominal incision, large amounts of serous fluid spontaneously oozing from wound, moderate pain, hypoactive bowel sounds   Musculoskeletal: Normal range of motion. She exhibits no edema or tenderness.   Neurological: She is alert and oriented to person, place, and time. No cranial nerve deficit.   Skin: Skin is warm and dry. No rash noted.   Psychiatric: She has a normal mood and affect.   Nursing note and vitals reviewed.      Recent Labs      08/25/17   1410  08/25/17   1553  08/26/17   0318   WBC  21.4*  21.1*  18.2*   RBC  3.89*  3.73*  3.65*   HEMOGLOBIN  11.2*  10.6*  10.5*   HEMATOCRIT  34.0*  32.1*  33.2*   MCV  87.4  86.1  91.0   MCH  28.8  28.4  28.8   MCHC  32.9*  33.0*  31.6*   RDW  50.4*  49.8  54.4*   PLATELETCT  1590*  1493*  1260*   MPV  9.1  9.1  10.1     Recent Labs      08/25/17   1410  08/26/17   0318   SODIUM  134*  135   POTASSIUM  3.2*  3.1*   CHLORIDE  93*  101   CO2  24  22   GLUCOSE  85  79   BUN  9  7*   CREATININE  0.46*  0.45*   CALCIUM  9.1  8.4*                       Assessment/Plan     Intra-abdominal fluid collection   Assessment & Plan    -appears to be serous given obvious output from abdominal incision today  -cover with IV zosyn for possible abscess as well, as outlined above        Abdominal wall cellulitis- (present on admission)   Assessment & Plan    -no obvious change today.  -continue IV zosyn for broad coverage  -spontaneous large amounts of serous fluid exuding from abdominal incision  -planned for incision and drainage tomorrow by GS  -follow closely  -adjust pain meds PRN for good control        Thrombocytosis (CMS-HCC)- (present on admission)   Assessment & Plan    -very high, 2/2 concurrent infection, monitor closely        Pancreatic cancer (CMS-HCC)- (present on admission)   Assessment & Plan    -appears to be moderately differentiated, overall quite poor prognosis        Hyponatremia- (present on admission)   Assessment & Plan    -resolved, likely 2/2 hypovolemia        Hypokalemia- (present on admission)   Assessment & Plan    -resolved, monitor closely        Sepsis (CMS-HCC)- (present on admission)   Assessment & Plan    -This is sepsis (without associated acute organ dysfunction).   -2/2 intra abdominal fluid collection, improving, as outlined above          Labs reviewed and Medications reviewed  Owens catheter: No Owens      DVT Prophylaxis: Contraindicated - High bleeding risk  DVT prophylaxis - mechanical: SCDs      Assessed for rehab: Patient was assess for and/or received rehabilitation services during this hospitalization

## 2017-08-27 NOTE — PROGRESS NOTES
Received report from day shift RN.  Pt A&O x 3, disoriented to time.  Pain reported at 8/10. Medicated per MAR.   Pt denies nausea, chest pain, shortness of breath at this time.   Midline incision approximated, redness noted. Upper part of incision is open, ostomy drain bag in place for drainage. Small brown green drainage noted in the bag.  +loose stools. +void.   Pt ambulates with standby assist.   Bed alarm in use. Call light within reach. Bed locked and in lowest position.   All needs are met at this time. Plan of care discussed with pt.

## 2017-08-27 NOTE — PROGRESS NOTES
Ivana from Lab called with critical result of potassium level 2.7 at 0408. Critical lab result read back to Ivana.   Olivia VOGT notified of critical lab result at 0413.  Critical lab result read back by Olivia VOGT. TORIE told this RN that she will place new orders. Awaiting new orders.

## 2017-08-27 NOTE — PROGRESS NOTES
Chito from Lab called with critical result of platelet 1401 at 0340. Critical lab result read back to Chito.     On call hospitalist paged. Awaiting phone call from Olivia VOGT.

## 2017-08-27 NOTE — PROGRESS NOTES
[facility] Hospitalist Progress Note    Date of Service: 2017    Chief Complaint  57 y.o. female admitted 2017 with abdominal cellulitis and intra abdominal fluid collections    Interval Problem Update  Abdominal issues-continued drainage from surgical site. Colostomy bag was placed over it. Now a brownish color. Patient has intense pain and current IV morphine and PO oxycodone is not helping the pain all that much. Denies any N/V.     Consultants/Specialty  -General surgery    Disposition  Surgical floor for now        Review of Systems   Constitutional: Positive for chills and malaise/fatigue. Negative for fever and weight loss.        No clear change noted today   HENT: Negative for hearing loss.    Eyes: Negative for blurred vision.   Respiratory: Negative for cough.    Cardiovascular: Negative for chest pain and palpitations.   Gastrointestinal: Positive for abdominal pain (even worse today, lots of drainage from abdominal wound). Negative for diarrhea, nausea and vomiting.   Genitourinary: Negative for frequency and urgency.   Musculoskeletal: Positive for myalgias. Negative for neck pain.   Skin: Positive for rash.   Neurological: Positive for weakness. Negative for dizziness, tingling, focal weakness and loss of consciousness.   Psychiatric/Behavioral: Negative for depression.   All other systems reviewed and are negative.     Physical Exam  Laboratory/Imaging   Hemodynamics  Temp (24hrs), Av.1 °C (98.8 °F), Min:36.1 °C (97 °F), Max:38.3 °C (100.9 °F)   Temperature: 37 °C (98.6 °F)  Pulse  Av.7  Min: 74  Max: 103    Blood Pressure: 125/86, NIBP: 136/81      Respiratory      Respiration: 16, Pulse Oximetry: 97 %        RUL Breath Sounds: Clear, RML Breath Sounds: Diminished, RLL Breath Sounds: Diminished, CURTIS Breath Sounds: Clear, LLL Breath Sounds: Diminished    Fluids    Intake/Output Summary (Last 24 hours) at 17 1253  Last data filed at 17 0845   Gross per 24 hour   Intake              2040 ml   Output                0 ml   Net             2040 ml       Nutrition  Orders Placed This Encounter   Procedures   • DIET NPO     Standing Status:   Standing     Number of Occurrences:   8     Order Specific Question:   Restrict to:     Answer:   Sips with Medications [3]     Physical Exam   Constitutional: She is oriented to person, place, and time. She appears well-developed and well-nourished. No distress.   In obvious pain   HENT:   Head: Normocephalic and atraumatic.   Mouth/Throat: No oropharyngeal exudate.   Eyes: Pupils are equal, round, and reactive to light. Right eye exhibits no discharge. Left eye exhibits no discharge.   Neck: Normal range of motion. Neck supple.   Cardiovascular: Normal rate, regular rhythm, normal heart sounds and intact distal pulses.    No murmur heard.  Pulmonary/Chest: Effort normal and breath sounds normal. No stridor. No respiratory distress.   Abdominal: Soft. She exhibits no distension. There is tenderness.   Large mid line vertical abdominal incision, large amounts of serous  And purulent fluid spontaneously oozing from wound being collected in makeshift ostomy bag, moderate pain, hypoactive bowel sounds    Erythema along the incisional site has receded somewhat   Musculoskeletal: Normal range of motion. She exhibits no edema.   Neurological: She is alert and oriented to person, place, and time. No cranial nerve deficit.   Skin: Skin is warm and dry. No rash noted.   Psychiatric: She has a normal mood and affect.   Nursing note and vitals reviewed.      Recent Labs      08/25/17   1553  08/26/17   0318  08/27/17   0251   WBC  21.1*  18.2*  13.5*   RBC  3.73*  3.65*  3.42*   HEMOGLOBIN  10.6*  10.5*  9.8*   HEMATOCRIT  32.1*  33.2*  30.0*   MCV  86.1  91.0  87.7   MCH  28.4  28.8  28.7   MCHC  33.0*  31.6*  32.7*   RDW  49.8  54.4*  51.9*   PLATELETCT  1493*  1260*  1401*   MPV  9.1  10.1  9.2     Recent Labs      08/25/17   1410  08/26/17   0318  08/27/17    0251   SODIUM  134*  135  139   POTASSIUM  3.2*  3.1*  2.7*   CHLORIDE  93*  101  102   CO2  24  22  26   GLUCOSE  85  79  114*   BUN  9  7*  2*   CREATININE  0.46*  0.45*  0.49*   CALCIUM  9.1  8.4*  8.5                      Assessment/Plan     Intra-abdominal fluid collection   Assessment & Plan    -appears to be serous and now purulent today given obvious output from abdominal incision today, continue ostomy bag, drainage surgically as outlined above  -cover with IV zosyn for possible abscess as well, as outlined above        Abdominal wall cellulitis- (present on admission)   Assessment & Plan    -continues to drain profusely from wound site, but erythema along the prior surgical line site has improved somewhat  -continue IV zosyn for broad coverage, day#2, follow up on intra-abdominal fluid collection cultures  -spontaneous large amounts of serous fluid exuding from abdominal incision  -planned for incision and drainage today by general surgery  -follow closely  -adjust pain meds PRN for good control        Thrombocytosis (CMS-HCC)- (present on admission)   Assessment & Plan    -very high, 2/2 concurrent infection, monitor closely        Pancreatic cancer (CMS-HCC)- (present on admission)   Assessment & Plan    -appears to be moderately differentiated, overall quite poor prognosis        Hyponatremia- (present on admission)   Assessment & Plan    -resolved, likely 2/2 hypovolemia        Hypokalemia- (present on admission)   Assessment & Plan    -resolved, monitor closely        Sepsis (CMS-HCC)- (present on admission)   Assessment & Plan    -This is sepsis (without associated acute organ dysfunction).   -2/2 intra abdominal fluid collection, improving, as outlined above          Quality-Core Measures     DVT-SCD's

## 2017-08-27 NOTE — PROGRESS NOTES
Pt AAOx4.   C/o pain at 8/10. Medicated per MAR.   Healing abdominal midline incision noted w/ redness and milky brown drainage.   LBM last night, reportedly loose. + flatus.   No nausea or vomiting.   Pt voiding in bathroom and up with standby assist.   Pt currently NPO with sips with medications.   Discussed plan of care with pt to have surgery today.   Call light within reach, pt educated to call for assistance.  Bed alarm on.   All needs addressed at this time.   Will continue to monitor.

## 2017-08-27 NOTE — OR SURGEON
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    Anesthesiologist/Type of Anesthesia:  Anesthesiologist: Brad Holliday M.D./General    Surgical Staff:  Circulator: Adelina Dos Santos R.N.  Scrub Person: Carolyne Roach    Specimens:  * No specimens in log *    Estimated Blood Loss: scant    Findings:       Complications:           8/27/2017 1:40 PM Calin Black

## 2017-08-27 NOTE — ASSESSMENT & PLAN NOTE
-s/p evacuation of fluid and MICHELLE drain insertion POD#1, appears to be working quite well right now  -Stop IV Zosyn, start IV ceftriaxone and Flagyl, intra-abdominal fluid cultures are negative  -follow closely  -adjust pain meds PRN for good control  -General surgery is following

## 2017-08-27 NOTE — CARE PLAN
Problem: Infection  Goal: Will remain free from infection  Outcome: PROGRESSING AS EXPECTED  Patient given CHG bath prior to surgery.      Problem: Pain Management  Goal: Pain level will decrease to patient's comfort goal  Outcome: PROGRESSING AS EXPECTED  Patient having complaints of pain 10/10, medicated per MAR.

## 2017-08-28 LAB
ALBUMIN SERPL BCP-MCNC: 2.7 G/DL (ref 3.2–4.9)
ALBUMIN/GLOB SERPL: 0.9 G/DL
ALP SERPL-CCNC: 108 U/L (ref 30–99)
ALT SERPL-CCNC: 11 U/L (ref 2–50)
ANION GAP SERPL CALC-SCNC: 9 MMOL/L (ref 0–11.9)
AST SERPL-CCNC: 26 U/L (ref 12–45)
BASOPHILS # BLD AUTO: 0.7 % (ref 0–1.8)
BASOPHILS # BLD: 0.08 K/UL (ref 0–0.12)
BILIRUB SERPL-MCNC: 0.3 MG/DL (ref 0.1–1.5)
BUN SERPL-MCNC: 2 MG/DL (ref 8–22)
CALCIUM SERPL-MCNC: 8.3 MG/DL (ref 8.5–10.5)
CHLORIDE SERPL-SCNC: 99 MMOL/L (ref 96–112)
CO2 SERPL-SCNC: 29 MMOL/L (ref 20–33)
CREAT SERPL-MCNC: 0.36 MG/DL (ref 0.5–1.4)
EOSINOPHIL # BLD AUTO: 0.3 K/UL (ref 0–0.51)
EOSINOPHIL NFR BLD: 2.5 % (ref 0–6.9)
ERYTHROCYTE [DISTWIDTH] IN BLOOD BY AUTOMATED COUNT: 54.4 FL (ref 35.9–50)
GFR SERPL CREATININE-BSD FRML MDRD: >60 ML/MIN/1.73 M 2
GLOBULIN SER CALC-MCNC: 2.9 G/DL (ref 1.9–3.5)
GLUCOSE SERPL-MCNC: 86 MG/DL (ref 65–99)
HCT VFR BLD AUTO: 28.7 % (ref 37–47)
HGB BLD-MCNC: 8.9 G/DL (ref 12–16)
IMM GRANULOCYTES # BLD AUTO: 0.06 K/UL (ref 0–0.11)
IMM GRANULOCYTES NFR BLD AUTO: 0.5 % (ref 0–0.9)
LYMPHOCYTES # BLD AUTO: 1.53 K/UL (ref 1–4.8)
LYMPHOCYTES NFR BLD: 12.7 % (ref 22–41)
MAGNESIUM SERPL-MCNC: 1.7 MG/DL (ref 1.5–2.5)
MCH RBC QN AUTO: 28 PG (ref 27–33)
MCHC RBC AUTO-ENTMCNC: 31 G/DL (ref 33.6–35)
MCV RBC AUTO: 90.3 FL (ref 81.4–97.8)
MONOCYTES # BLD AUTO: 1.74 K/UL (ref 0–0.85)
MONOCYTES NFR BLD AUTO: 14.4 % (ref 0–13.4)
NEUTROPHILS # BLD AUTO: 8.35 K/UL (ref 2–7.15)
NEUTROPHILS NFR BLD: 69.2 % (ref 44–72)
NRBC # BLD AUTO: 0.03 K/UL
NRBC BLD AUTO-RTO: 0.2 /100 WBC
PLATELET # BLD AUTO: 1307 K/UL (ref 164–446)
PMV BLD AUTO: 9.4 FL (ref 9–12.9)
POTASSIUM SERPL-SCNC: 2.9 MMOL/L (ref 3.6–5.5)
PROT SERPL-MCNC: 5.6 G/DL (ref 6–8.2)
RBC # BLD AUTO: 3.18 M/UL (ref 4.2–5.4)
SODIUM SERPL-SCNC: 137 MMOL/L (ref 135–145)
WBC # BLD AUTO: 12.1 K/UL (ref 4.8–10.8)

## 2017-08-28 PROCEDURE — 700101 HCHG RX REV CODE 250: Performed by: INTERNAL MEDICINE

## 2017-08-28 PROCEDURE — 700111 HCHG RX REV CODE 636 W/ 250 OVERRIDE (IP): Performed by: INTERNAL MEDICINE

## 2017-08-28 PROCEDURE — 700102 HCHG RX REV CODE 250 W/ 637 OVERRIDE(OP): Performed by: HOSPITALIST

## 2017-08-28 PROCEDURE — 80053 COMPREHEN METABOLIC PANEL: CPT

## 2017-08-28 PROCEDURE — 700111 HCHG RX REV CODE 636 W/ 250 OVERRIDE (IP): Performed by: SURGERY

## 2017-08-28 PROCEDURE — 700105 HCHG RX REV CODE 258: Performed by: HOSPITALIST

## 2017-08-28 PROCEDURE — A9270 NON-COVERED ITEM OR SERVICE: HCPCS | Performed by: HOSPITALIST

## 2017-08-28 PROCEDURE — 700111 HCHG RX REV CODE 636 W/ 250 OVERRIDE (IP): Performed by: HOSPITALIST

## 2017-08-28 PROCEDURE — A9270 NON-COVERED ITEM OR SERVICE: HCPCS | Performed by: INTERNAL MEDICINE

## 2017-08-28 PROCEDURE — 83735 ASSAY OF MAGNESIUM: CPT

## 2017-08-28 PROCEDURE — 700105 HCHG RX REV CODE 258: Performed by: INTERNAL MEDICINE

## 2017-08-28 PROCEDURE — 700102 HCHG RX REV CODE 250 W/ 637 OVERRIDE(OP): Performed by: INTERNAL MEDICINE

## 2017-08-28 PROCEDURE — 770006 HCHG ROOM/CARE - MED/SURG/GYN SEMI*

## 2017-08-28 PROCEDURE — 85025 COMPLETE CBC W/AUTO DIFF WBC: CPT

## 2017-08-28 PROCEDURE — 99232 SBSQ HOSP IP/OBS MODERATE 35: CPT | Performed by: INTERNAL MEDICINE

## 2017-08-28 RX ORDER — POTASSIUM CHLORIDE 7.45 MG/ML
10 INJECTION INTRAVENOUS
Status: COMPLETED | OUTPATIENT
Start: 2017-08-28 | End: 2017-08-28

## 2017-08-28 RX ORDER — POTASSIUM CHLORIDE 7.45 MG/ML
10 INJECTION INTRAVENOUS
Status: DISCONTINUED | OUTPATIENT
Start: 2017-08-28 | End: 2017-08-28

## 2017-08-28 RX ORDER — SODIUM CHLORIDE AND POTASSIUM CHLORIDE 300; 900 MG/100ML; MG/100ML
INJECTION, SOLUTION INTRAVENOUS CONTINUOUS
Status: DISCONTINUED | OUTPATIENT
Start: 2017-08-28 | End: 2017-08-29

## 2017-08-28 RX ORDER — METRONIDAZOLE 500 MG/1
500 TABLET ORAL EVERY 8 HOURS
Status: DISCONTINUED | OUTPATIENT
Start: 2017-08-28 | End: 2017-08-29 | Stop reason: HOSPADM

## 2017-08-28 RX ADMIN — METRONIDAZOLE 500 MG: 500 TABLET ORAL at 17:51

## 2017-08-28 RX ADMIN — HYDROMORPHONE HYDROCHLORIDE 0.5 MG: 1 INJECTION, SOLUTION INTRAMUSCULAR; INTRAVENOUS; SUBCUTANEOUS at 15:28

## 2017-08-28 RX ADMIN — PIPERACILLIN SODIUM AND TAZOBACTAM SODIUM 4.5 G: 4; .5 INJECTION, POWDER, FOR SOLUTION INTRAVENOUS at 00:16

## 2017-08-28 RX ADMIN — HYDROMORPHONE HYDROCHLORIDE 0.5 MG: 1 INJECTION, SOLUTION INTRAMUSCULAR; INTRAVENOUS; SUBCUTANEOUS at 01:12

## 2017-08-28 RX ADMIN — STANDARDIZED SENNA CONCENTRATE AND DOCUSATE SODIUM 2 TABLET: 8.6; 5 TABLET, FILM COATED ORAL at 08:46

## 2017-08-28 RX ADMIN — POTASSIUM CHLORIDE AND SODIUM CHLORIDE: 900; 300 INJECTION, SOLUTION INTRAVENOUS at 08:38

## 2017-08-28 RX ADMIN — POTASSIUM CHLORIDE 10 MEQ: 10 INJECTION, SOLUTION INTRAVENOUS at 11:36

## 2017-08-28 RX ADMIN — PIPERACILLIN SODIUM AND TAZOBACTAM SODIUM 4.5 G: 4; .5 INJECTION, POWDER, FOR SOLUTION INTRAVENOUS at 05:49

## 2017-08-28 RX ADMIN — OXYCODONE HYDROCHLORIDE 10 MG: 10 TABLET ORAL at 07:43

## 2017-08-28 RX ADMIN — HYDROMORPHONE HYDROCHLORIDE 0.5 MG: 1 INJECTION, SOLUTION INTRAMUSCULAR; INTRAVENOUS; SUBCUTANEOUS at 11:38

## 2017-08-28 RX ADMIN — HYDROMORPHONE HYDROCHLORIDE 0.5 MG: 1 INJECTION, SOLUTION INTRAMUSCULAR; INTRAVENOUS; SUBCUTANEOUS at 06:06

## 2017-08-28 RX ADMIN — METRONIDAZOLE 500 MG: 500 TABLET ORAL at 21:51

## 2017-08-28 RX ADMIN — LORAZEPAM 1 MG: 1 TABLET ORAL at 21:50

## 2017-08-28 RX ADMIN — CEFTRIAXONE 2 G: 2 INJECTION, POWDER, FOR SOLUTION INTRAMUSCULAR; INTRAVENOUS at 17:51

## 2017-08-28 RX ADMIN — LORAZEPAM 1 MG: 1 TABLET ORAL at 08:46

## 2017-08-28 RX ADMIN — PIPERACILLIN SODIUM AND TAZOBACTAM SODIUM 4.5 G: 4; .5 INJECTION, POWDER, FOR SOLUTION INTRAVENOUS at 12:39

## 2017-08-28 RX ADMIN — OXYCODONE HYDROCHLORIDE 10 MG: 10 TABLET ORAL at 13:51

## 2017-08-28 RX ADMIN — POTASSIUM CHLORIDE 10 MEQ: 10 INJECTION, SOLUTION INTRAVENOUS at 08:04

## 2017-08-28 RX ADMIN — POTASSIUM CHLORIDE 10 MEQ: 10 INJECTION, SOLUTION INTRAVENOUS at 09:26

## 2017-08-28 RX ADMIN — POTASSIUM CHLORIDE 10 MEQ: 10 INJECTION, SOLUTION INTRAVENOUS at 10:25

## 2017-08-28 RX ADMIN — SODIUM CHLORIDE: 9 INJECTION, SOLUTION INTRAVENOUS at 04:00

## 2017-08-28 RX ADMIN — OXYCODONE HYDROCHLORIDE 10 MG: 10 TABLET ORAL at 17:51

## 2017-08-28 RX ADMIN — OXYCODONE HYDROCHLORIDE 10 MG: 10 TABLET ORAL at 04:01

## 2017-08-28 RX ADMIN — HYDROMORPHONE HYDROCHLORIDE 0.5 MG: 1 INJECTION, SOLUTION INTRAMUSCULAR; INTRAVENOUS; SUBCUTANEOUS at 21:51

## 2017-08-28 RX ADMIN — FLUOXETINE HYDROCHLORIDE 40 MG: 20 CAPSULE ORAL at 08:46

## 2017-08-28 ASSESSMENT — PAIN SCALES - GENERAL
PAINLEVEL_OUTOF10: 10
PAINLEVEL_OUTOF10: 9
PAINLEVEL_OUTOF10: 4
PAINLEVEL_OUTOF10: 4
PAINLEVEL_OUTOF10: 9
PAINLEVEL_OUTOF10: 4
PAINLEVEL_OUTOF10: 8
PAINLEVEL_OUTOF10: 9

## 2017-08-28 ASSESSMENT — ENCOUNTER SYMPTOMS
VOMITING: 0
HEADACHES: 0
DIZZINESS: 0
DEPRESSION: 0
LOSS OF CONSCIOUSNESS: 0
CHILLS: 0
WEIGHT LOSS: 0
MYALGIAS: 0
FOCAL WEAKNESS: 0
NAUSEA: 0
BLURRED VISION: 0
NECK PAIN: 0
COUGH: 0
ABDOMINAL PAIN: 1
WEAKNESS: 1
FEVER: 0
DIARRHEA: 0

## 2017-08-28 NOTE — CARE PLAN
Problem: Safety  Goal: Will remain free from injury  Outcome: PROGRESSING AS EXPECTED  Patient educated to call for assistance before getting out of bed.  Call light within reach.  Bed in lowest position.  Bed alarm in place and on.      Problem: Pain Management  Goal: Pain level will decrease to patient's comfort goal  Outcome: PROGRESSING AS EXPECTED  Patient having complaints of pain 9/10, medicated per MAR.

## 2017-08-28 NOTE — PROGRESS NOTES
[facility] Hospitalist Progress Note    Date of Service: 2017    Chief Complaint  57 y.o. female admitted 2017 with abdominal cellulitis and intra abdominal fluid collections    Interval Problem Update  Abdominal issues-s/p drainage of the abdomen and MICHELLE drain in place. Modest quantity of fluid has been obtained, appears pancreatic in nature. Afebrile. Belly pain is much better controlled today. Tolerating breakfast without any real issue today.  All cultures are ngtd.    Consultants/Specialty  -General surgery    Disposition  Surgical floor for now        Review of Systems   Constitutional: Positive for malaise/fatigue (a little better today). Negative for chills, fever and weight loss.        No clear change noted today   Eyes: Negative for blurred vision.   Respiratory: Negative for cough.    Cardiovascular: Negative for chest pain.   Gastrointestinal: Positive for abdominal pain (much less intense today). Negative for diarrhea, nausea and vomiting.   Genitourinary: Negative for dysuria and hematuria.   Musculoskeletal: Negative for myalgias and neck pain.   Skin: Positive for rash.   Neurological: Positive for weakness (much stronger today, ambulating in the hallway). Negative for dizziness, focal weakness, loss of consciousness and headaches.   Psychiatric/Behavioral: Negative for depression.   All other systems reviewed and are negative.     Physical Exam  Laboratory/Imaging   Hemodynamics  Temp (24hrs), Av.9 °C (98.5 °F), Min:36.6 °C (97.8 °F), Max:37.2 °C (98.9 °F)   Temperature: 37.2 °C (98.9 °F)  Pulse  Av.6  Min: 71  Max: 103    Blood Pressure: 128/82      Respiratory      Respiration: 18, Pulse Oximetry: 92 %        RUL Breath Sounds: Clear, RML Breath Sounds: Clear, RLL Breath Sounds: Diminished, CURTIS Breath Sounds: Clear, LLL Breath Sounds: Diminished    Fluids    Intake/Output Summary (Last 24 hours) at 17 1620  Last data filed at 17 1200   Gross per 24 hour   Intake              3000 ml   Output               95 ml   Net             2905 ml       Nutrition  Orders Placed This Encounter   Procedures   • DIET ORDER     Standing Status:   Standing     Number of Occurrences:   1     Order Specific Question:   Diet:     Answer:   Regular [1]     Order Specific Question:   Diet:     Answer:   Diabetic [3]     Physical Exam   Constitutional: She is oriented to person, place, and time. She appears well-developed and well-nourished. No distress.   In obvious pain   HENT:   Head: Normocephalic and atraumatic.   Mouth/Throat: No oropharyngeal exudate.   Eyes: Pupils are equal, round, and reactive to light. No scleral icterus.   Neck: Normal range of motion. Neck supple.   Cardiovascular: Normal rate, regular rhythm, normal heart sounds and intact distal pulses.    No murmur heard.  Pulmonary/Chest: Effort normal and breath sounds normal. No stridor. No respiratory distress.   Abdominal: Soft. She exhibits no distension. There is tenderness.   Large mid line vertical abdominal incision, MICHELLE drain in place, moderate brown colored fluid present, tenderness is much less intense today    Erythema along the incisional site continues to recede   Musculoskeletal: Normal range of motion. She exhibits no edema.   Neurological: She is alert and oriented to person, place, and time. No cranial nerve deficit.   Skin: Skin is warm and dry. No rash noted.   Psychiatric: She has a normal mood and affect.   Nursing note and vitals reviewed.      Recent Labs      08/26/17 0318 08/27/17 0251 08/28/17   0338   WBC  18.2*  13.5*  12.1*   RBC  3.65*  3.42*  3.18*   HEMOGLOBIN  10.5*  9.8*  8.9*   HEMATOCRIT  33.2*  30.0*  28.7*   MCV  91.0  87.7  90.3   MCH  28.8  28.7  28.0   MCHC  31.6*  32.7*  31.0*   RDW  54.4*  51.9*  54.4*   PLATELETCT  1260*  1401*  1307*   MPV  10.1  9.2  9.4     Recent Labs      08/26/17 0318 08/27/17   0251  08/28/17   0338   SODIUM  135  139  137   POTASSIUM  3.1*  2.7*  2.9*    CHLORIDE  101  102  99   CO2  22  26  29   GLUCOSE  79  114*  86   BUN  7*  2*  2*   CREATININE  0.45*  0.49*  0.36*   CALCIUM  8.4*  8.5  8.3*                      Assessment/Plan     Intra-abdominal fluid collection   Assessment & Plan    -See above        Abdominal wall cellulitis- (present on admission)   Assessment & Plan    -s/p evacuation of fluid and MICHELLE drain insertion POD#1, appears to be working quite well right now  -Stop IV Zosyn, start IV ceftriaxone and Flagyl, intra-abdominal fluid cultures are negative  -follow closely  -adjust pain meds PRN for good control  -General surgery is following        Thrombocytosis (CMS-[facility])- (present on admission)   Assessment & Plan    -very high, 2/2 concurrent infection, monitor closely, slowly down trending, related to infection and large stress reaction from recent large abdominal surgery        Pancreatic cancer (CMS-HCC)- (present on admission)   Assessment & Plan    -appears to be moderately differentiated, overall quite poor prognosis        Hyponatremia- (present on admission)   Assessment & Plan    -resolved, likely 2/2 hypovolemia        Hypokalemia- (present on admission)   Assessment & Plan    -resolved, monitor closely        Sepsis (CMS-HCC)- (present on admission)   Assessment & Plan    -This is sepsis (without associated acute organ dysfunction).   -2/2 intra abdominal fluid collection, improving, as outlined above          Quality-Core Measures     DVT-SCD's

## 2017-08-28 NOTE — CARE PLAN
Problem: Safety  Goal: Will remain free from falls  Outcome: PROGRESSING AS EXPECTED  Pt urinating frequently. Pt ambulates to bathroom with standby assist frequently. Bed alarm in use. Call light within reach. Hourly rounding in place.    Problem: Infection  Goal: Will remain free from infection  Outcome: PROGRESSING AS EXPECTED  Zosyn given per MAR. No new infection sign noted.    Problem: Pain Management  Goal: Pain level will decrease to patient's comfort goal  Outcome: PROGRESSING AS EXPECTED  Pt able to sleep comfortably. PRN medications given with positive results.

## 2017-08-28 NOTE — PROGRESS NOTES
Received report from evening RN.  Assumed care of patient.  Patient A&Ox4.  C/O pain 9/10 in abdomen.  Medicated per MAR.  No complaints of nausea or vomiting.  Midline abdominal incision with scant drainage on dressing.  MICHELLE drain with dark brown/reddish drainage.  FONTANEZ, strength 5+.  No complaints of numbness/tingling.  Ambulating with SBA.  +void.  Plan of care discussed.  Call light within reach.  Bed in lowest position.

## 2017-08-28 NOTE — PROGRESS NOTES
Received report from day shift RN.  Pt A&O x 4.  Pain reported at 9/10. Medicated per MAR.   Pt denies nausea, chest pain, shortness of breath at this time.   Midline incision with dressing, CDI.  MICHELLE drain at mid abd to bulb suction, serosanguinous output noted.  Central line in place to right neck, dressing CDI, placed for possible TPN per PACU nurse.   +loose stools. +void.   Pt ambulates with standby assist.   Bed alarm in use. Call light within reach. Bed locked and in lowest position.   All needs are met at this time. Plan of care discussed with pt.

## 2017-08-28 NOTE — OP REPORT
DATE OF SERVICE:  08/27/2017    SURGEON:  Calin Hernandez MD.    PROCEDURE:  Exploration of abdominal wall wound and placement of   intraabdominal drain.    ANESTHESIA:  General.    ESTIMATED BLOOD LOSS:  Scant.    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    SUMMARY:  This 57-year-old male had pancreatic resection by Dr. Bermudez   about a week ago and the patient presented with abdominal wall swelling and   pain.  She is scheduled for incision and drainage of a possible wound abscess   and spontaneously drained a large amount of foul fluid which was cultured by   Dr. Marin.    The amylase was 44,000 consistent with a possible pancreatic fistula.    She had an ostomy bag placed and a large amount of material coming out of this   and at this time is scheduled for wound exploration and placement of drain.    DESCRIPTION OF PROCEDURE:  The patient was taken to the operating room and   satisfactory general anesthesia was induced with endotracheal intubation.  The   patient was prepped and draped.  The wound was opened for about 2 inches and   some debris removed.  Cultures were taken.    A small tract was seen and through this, a 10 mm Donaldo-Hadley drain was   placed.  This extended the wound corner and was sutured in place with a 2-0   nylon suture.  The remaining inch and a half of incision was stapled.  Wound   was then dressed and patient sent to recovery room in good condition.    Specimen sent to pathology was labeled abdominal wall cultures.       ____________________________________     CALIN HERNANDEZ MD    TER / NTS    DD:  08/27/2017 13:42:32  DT:  08/27/2017 17:05:53    D#:  [id number]  Job#:  [id number]    cc: RUTH BERMUDEZ MD

## 2017-08-28 NOTE — PROGRESS NOTES
Beto from Lab called with critical result of platelet 1307 at 0500. Critical lab result read back to Beto.   Platelet count is dropped to 1307 today from 1401 yesterday. MD is aware of high platelet count. Will continue to monitor closely.

## 2017-08-28 NOTE — PROGRESS NOTES
Surgical Progress Note    Author: Gera Lancaster Date & Time created: 2017   7:23 AM     Interval Events:  Feels better  Pain well controlled  Hungry  MICHELLE appears pancreatic    Review of Systems   Gastrointestinal: Positive for abdominal pain.   Neurological: Positive for weakness.     Hemodynamics:  Temp (24hrs), Av.2 °C (99 °F), Min:36.9 °C (98.4 °F), Max:37.7 °C (99.9 °F)  Temperature: 36.9 °C (98.4 °F)  Pulse  Av.8  Min: 71  Max: 103Heart Rate (Monitored): 82  Blood Pressure: 130/78, NIBP: 123/77     Respiratory:    Respiration: 18, Pulse Oximetry: 96 %        RUL Breath Sounds: Clear, RML Breath Sounds: Diminished, RLL Breath Sounds: Diminished, CURTIS Breath Sounds: Clear, LLL Breath Sounds: Diminished  Neuro:  GCS       Fluids:    Intake/Output Summary (Last 24 hours) at 17 0723  Last data filed at 17 0600   Gross per 24 hour   Intake             3550 ml   Output               70 ml   Net             3480 ml        Current Diet Order   Procedures   • DIET ORDER     Physical Exam   Constitutional: She is oriented to person, place, and time. She appears well-developed and well-nourished.   HENT:   Head: Normocephalic and atraumatic.   Eyes: Conjunctivae are normal. Pupils are equal, round, and reactive to light.   Neck: Neck supple.   Cardiovascular: Normal rate and regular rhythm.    Pulmonary/Chest: Effort normal and breath sounds normal.   Abdominal: Soft. Bowel sounds are normal. There is tenderness.   Minimal pain- MICHELLE pancreatic type fluid, cultures negative   Musculoskeletal: Normal range of motion.   Neurological: She is alert and oriented to person, place, and time.   Skin: Skin is warm and dry.   Psychiatric: She has a normal mood and affect. Her behavior is normal. Judgment and thought content normal.   Nursing note and vitals reviewed.    Labs:  Recent Results (from the past 24 hour(s))   GRAM STAIN    Collection Time: 17  1:31 PM   Result Value Ref Range     Significant Indicator .     Source WND     Site Abdominal Abscess     Gram Stain Result No organisms seen.    CBC WITH DIFFERENTIAL    Collection Time: 08/28/17  3:38 AM   Result Value Ref Range    WBC 12.1 (H) 4.8 - 10.8 K/uL    RBC 3.18 (L) 4.20 - 5.40 M/uL    Hemoglobin 8.9 (L) 12.0 - 16.0 g/dL    Hematocrit 28.7 (L) 37.0 - 47.0 %    MCV 90.3 81.4 - 97.8 fL    MCH 28.0 27.0 - 33.0 pg    MCHC 31.0 (L) 33.6 - 35.0 g/dL    RDW 54.4 (H) 35.9 - 50.0 fL    Platelet Count 1307 (HH) 164 - 446 K/uL    MPV 9.4 9.0 - 12.9 fL    Neutrophils-Polys 69.20 44.00 - 72.00 %    Lymphocytes 12.70 (L) 22.00 - 41.00 %    Monocytes 14.40 (H) 0.00 - 13.40 %    Eosinophils 2.50 0.00 - 6.90 %    Basophils 0.70 0.00 - 1.80 %    Immature Granulocytes 0.50 0.00 - 0.90 %    Nucleated RBC 0.20 /100 WBC    Neutrophils (Absolute) 8.35 (H) 2.00 - 7.15 K/uL    Lymphs (Absolute) 1.53 1.00 - 4.80 K/uL    Monos (Absolute) 1.74 (H) 0.00 - 0.85 K/uL    Eos (Absolute) 0.30 0.00 - 0.51 K/uL    Baso (Absolute) 0.08 0.00 - 0.12 K/uL    Immature Granulocytes (abs) 0.06 0.00 - 0.11 K/uL    NRBC (Absolute) 0.03 K/uL   COMP METABOLIC PANEL    Collection Time: 08/28/17  3:38 AM   Result Value Ref Range    Sodium 137 135 - 145 mmol/L    Potassium 2.9 (L) 3.6 - 5.5 mmol/L    Chloride 99 96 - 112 mmol/L    Co2 29 20 - 33 mmol/L    Anion Gap 9.0 0.0 - 11.9    Glucose 86 65 - 99 mg/dL    Bun 2 (L) 8 - 22 mg/dL    Creatinine 0.36 (L) 0.50 - 1.40 mg/dL    Calcium 8.3 (L) 8.5 - 10.5 mg/dL    AST(SGOT) 26 12 - 45 U/L    ALT(SGPT) 11 2 - 50 U/L    Alkaline Phosphatase 108 (H) 30 - 99 U/L    Total Bilirubin 0.3 0.1 - 1.5 mg/dL    Albumin 2.7 (L) 3.2 - 4.9 g/dL    Total Protein 5.6 (L) 6.0 - 8.2 g/dL    Globulin 2.9 1.9 - 3.5 g/dL    A-G Ratio 0.9 g/dL   MAGNESIUM    Collection Time: 08/28/17  3:38 AM   Result Value Ref Range    Magnesium 1.7 1.5 - 2.5 mg/dL   ESTIMATED GFR    Collection Time: 08/28/17  3:38 AM   Result Value Ref Range    GFR If  >60 >60  mL/min/1.73 m 2    GFR If Non African American >60 >60 mL/min/1.73 m 2     Medical Decision Making, by Problem:  Active Hospital Problems    Diagnosis   • Intra-abdominal fluid collection [R18.8]   • Sepsis (CMS-[facility]) [A41.9]   • Hypokalemia [E87.6]   • Hyponatremia [E87.1]   • Pancreatic cancer (CMS-[facility]) [C25.9]   • Thrombocytosis (CMS-[facility]) [D47.3]   • Abdominal wall cellulitis [L03.311]     Plan:  General diet  Transition from IV antibiotics  Keep MICHELLE in place  Set up home health  Replace K    Quality Measures:    Reviewed items::  Labs reviewed and Radiology images reviewed  Owens catheter::  No Owens  Central line in place:  Need for access  DVT prophylaxis pharmacological::  Enoxaparin (Lovenox)  DVT prophylaxis - mechanical:  SCDs  Ulcer Prophylaxis::  Yes  Antibiotics:  Treating active infection/contamination beyond 24 hours perioperative coverage      Discussed patient condition with Family and RN

## 2017-08-29 ENCOUNTER — PATIENT OUTREACH (OUTPATIENT)
Dept: HEALTH INFORMATION MANAGEMENT | Facility: OTHER | Age: 57
End: 2017-08-29

## 2017-08-29 VITALS
DIASTOLIC BLOOD PRESSURE: 88 MMHG | SYSTOLIC BLOOD PRESSURE: 136 MMHG | TEMPERATURE: 97.9 F | OXYGEN SATURATION: 96 % | HEIGHT: 65 IN | HEART RATE: 71 BPM | BODY MASS INDEX: 21.49 KG/M2 | RESPIRATION RATE: 17 BRPM | WEIGHT: 128.97 LBS

## 2017-08-29 PROBLEM — D75.839 THROMBOCYTOSIS: Status: RESOLVED | Noted: 2017-08-25 | Resolved: 2017-08-29

## 2017-08-29 PROBLEM — A41.9 SEPSIS, UNSPECIFIED: Status: RESOLVED | Noted: 2017-08-25 | Resolved: 2017-08-29

## 2017-08-29 PROBLEM — L03.311 ABDOMINAL WALL CELLULITIS: Status: RESOLVED | Noted: 2017-08-25 | Resolved: 2017-08-29

## 2017-08-29 PROBLEM — C25.9 PANCREATIC CANCER (HCC): Status: RESOLVED | Noted: 2017-08-25 | Resolved: 2017-08-29

## 2017-08-29 PROBLEM — E87.1 HYPONATREMIA: Status: RESOLVED | Noted: 2017-08-25 | Resolved: 2017-08-29

## 2017-08-29 LAB
ALBUMIN SERPL BCP-MCNC: 2.7 G/DL (ref 3.2–4.9)
ALBUMIN/GLOB SERPL: 0.9 G/DL
ALP SERPL-CCNC: 100 U/L (ref 30–99)
ALT SERPL-CCNC: 9 U/L (ref 2–50)
ANION GAP SERPL CALC-SCNC: 10 MMOL/L (ref 0–11.9)
AST SERPL-CCNC: 29 U/L (ref 12–45)
BASOPHILS # BLD AUTO: 0.7 % (ref 0–1.8)
BASOPHILS # BLD: 0.07 K/UL (ref 0–0.12)
BILIRUB SERPL-MCNC: 0.3 MG/DL (ref 0.1–1.5)
BUN SERPL-MCNC: 2 MG/DL (ref 8–22)
CALCIUM SERPL-MCNC: 8.5 MG/DL (ref 8.5–10.5)
CHLORIDE SERPL-SCNC: 98 MMOL/L (ref 96–112)
CO2 SERPL-SCNC: 29 MMOL/L (ref 20–33)
CREAT SERPL-MCNC: 0.31 MG/DL (ref 0.5–1.4)
EOSINOPHIL # BLD AUTO: 0.25 K/UL (ref 0–0.51)
EOSINOPHIL NFR BLD: 2.6 % (ref 0–6.9)
ERYTHROCYTE [DISTWIDTH] IN BLOOD BY AUTOMATED COUNT: 52.9 FL (ref 35.9–50)
GFR SERPL CREATININE-BSD FRML MDRD: >60 ML/MIN/1.73 M 2
GLOBULIN SER CALC-MCNC: 3 G/DL (ref 1.9–3.5)
GLUCOSE SERPL-MCNC: 88 MG/DL (ref 65–99)
HCT VFR BLD AUTO: 29.2 % (ref 37–47)
HGB BLD-MCNC: 9.2 G/DL (ref 12–16)
IMM GRANULOCYTES # BLD AUTO: 0.06 K/UL (ref 0–0.11)
IMM GRANULOCYTES NFR BLD AUTO: 0.6 % (ref 0–0.9)
LYMPHOCYTES # BLD AUTO: 1.54 K/UL (ref 1–4.8)
LYMPHOCYTES NFR BLD: 16.1 % (ref 22–41)
MAGNESIUM SERPL-MCNC: 1.5 MG/DL (ref 1.5–2.5)
MCH RBC QN AUTO: 28 PG (ref 27–33)
MCHC RBC AUTO-ENTMCNC: 31.5 G/DL (ref 33.6–35)
MCV RBC AUTO: 89 FL (ref 81.4–97.8)
MONOCYTES # BLD AUTO: 1.71 K/UL (ref 0–0.85)
MONOCYTES NFR BLD AUTO: 17.9 % (ref 0–13.4)
NEUTROPHILS # BLD AUTO: 5.94 K/UL (ref 2–7.15)
NEUTROPHILS NFR BLD: 62.1 % (ref 44–72)
NRBC # BLD AUTO: 0 K/UL
NRBC BLD AUTO-RTO: 0 /100 WBC
PLATELET # BLD AUTO: 1186 K/UL (ref 164–446)
PMV BLD AUTO: 9.1 FL (ref 9–12.9)
POTASSIUM SERPL-SCNC: 3.4 MMOL/L (ref 3.6–5.5)
PROT SERPL-MCNC: 5.7 G/DL (ref 6–8.2)
RBC # BLD AUTO: 3.28 M/UL (ref 4.2–5.4)
SODIUM SERPL-SCNC: 137 MMOL/L (ref 135–145)
WBC # BLD AUTO: 9.6 K/UL (ref 4.8–10.8)

## 2017-08-29 PROCEDURE — A9270 NON-COVERED ITEM OR SERVICE: HCPCS | Performed by: SURGERY

## 2017-08-29 PROCEDURE — 83735 ASSAY OF MAGNESIUM: CPT

## 2017-08-29 PROCEDURE — A9270 NON-COVERED ITEM OR SERVICE: HCPCS | Performed by: HOSPITALIST

## 2017-08-29 PROCEDURE — 700102 HCHG RX REV CODE 250 W/ 637 OVERRIDE(OP): Performed by: HOSPITALIST

## 2017-08-29 PROCEDURE — A9270 NON-COVERED ITEM OR SERVICE: HCPCS | Performed by: INTERNAL MEDICINE

## 2017-08-29 PROCEDURE — 99238 HOSP IP/OBS DSCHRG MGMT 30/<: CPT | Performed by: INTERNAL MEDICINE

## 2017-08-29 PROCEDURE — 85025 COMPLETE CBC W/AUTO DIFF WBC: CPT

## 2017-08-29 PROCEDURE — 700101 HCHG RX REV CODE 250: Performed by: INTERNAL MEDICINE

## 2017-08-29 PROCEDURE — 700111 HCHG RX REV CODE 636 W/ 250 OVERRIDE (IP): Performed by: INTERNAL MEDICINE

## 2017-08-29 PROCEDURE — 700102 HCHG RX REV CODE 250 W/ 637 OVERRIDE(OP): Performed by: SURGERY

## 2017-08-29 PROCEDURE — 700105 HCHG RX REV CODE 258: Performed by: SURGERY

## 2017-08-29 PROCEDURE — 700101 HCHG RX REV CODE 250: Performed by: SURGERY

## 2017-08-29 PROCEDURE — 700111 HCHG RX REV CODE 636 W/ 250 OVERRIDE (IP): Performed by: SURGERY

## 2017-08-29 PROCEDURE — 80053 COMPREHEN METABOLIC PANEL: CPT

## 2017-08-29 PROCEDURE — 700102 HCHG RX REV CODE 250 W/ 637 OVERRIDE(OP): Performed by: INTERNAL MEDICINE

## 2017-08-29 RX ORDER — FUROSEMIDE 20 MG/1
20 TABLET ORAL
Status: DISCONTINUED | OUTPATIENT
Start: 2017-08-29 | End: 2017-08-29 | Stop reason: HOSPADM

## 2017-08-29 RX ORDER — KETOROLAC TROMETHAMINE 10 MG/1
10 TABLET, FILM COATED ORAL EVERY 6 HOURS PRN
Qty: 30 TAB | Refills: 1 | Status: SHIPPED | OUTPATIENT
Start: 2017-08-29

## 2017-08-29 RX ORDER — OXYCODONE HYDROCHLORIDE 5 MG/1
5 TABLET ORAL
Qty: 40 TAB | Refills: 0 | Status: SHIPPED | OUTPATIENT
Start: 2017-08-29

## 2017-08-29 RX ORDER — OCTREOTIDE ACETATE 100 UG/ML
100 INJECTION, SOLUTION INTRAVENOUS; SUBCUTANEOUS 3 TIMES DAILY
Status: DISCONTINUED | OUTPATIENT
Start: 2017-08-29 | End: 2017-08-29

## 2017-08-29 RX ORDER — OCTREOTIDE ACETATE 100 UG/ML
100 INJECTION, SOLUTION INTRAVENOUS; SUBCUTANEOUS 3 TIMES DAILY
Status: DISCONTINUED | OUTPATIENT
Start: 2017-08-29 | End: 2017-08-29 | Stop reason: HOSPADM

## 2017-08-29 RX ADMIN — OXYCODONE HYDROCHLORIDE 5 MG: 5 TABLET ORAL at 08:36

## 2017-08-29 RX ADMIN — OXYCODONE HYDROCHLORIDE 10 MG: 10 TABLET ORAL at 01:58

## 2017-08-29 RX ADMIN — OCTREOTIDE ACETATE 100 MCG: 100 INJECTION, SOLUTION INTRAVENOUS; SUBCUTANEOUS at 08:38

## 2017-08-29 RX ADMIN — FLUOXETINE HYDROCHLORIDE 40 MG: 20 CAPSULE ORAL at 08:35

## 2017-08-29 RX ADMIN — LORAZEPAM 1 MG: 1 TABLET ORAL at 08:36

## 2017-08-29 RX ADMIN — POTASSIUM PHOSPHATE, MONOBASIC AND POTASSIUM PHOSPHATE, DIBASIC 30 MMOL: 224; 236 INJECTION, SOLUTION INTRAVENOUS at 08:37

## 2017-08-29 RX ADMIN — KETOROLAC TROMETHAMINE 10 MG: 10 TABLET, FILM COATED ORAL at 09:53

## 2017-08-29 RX ADMIN — METRONIDAZOLE 500 MG: 500 TABLET ORAL at 06:39

## 2017-08-29 RX ADMIN — HYDROMORPHONE HYDROCHLORIDE 0.5 MG: 1 INJECTION, SOLUTION INTRAMUSCULAR; INTRAVENOUS; SUBCUTANEOUS at 04:11

## 2017-08-29 RX ADMIN — POTASSIUM CHLORIDE AND SODIUM CHLORIDE: 900; 300 INJECTION, SOLUTION INTRAVENOUS at 03:07

## 2017-08-29 ASSESSMENT — PAIN SCALES - GENERAL
PAINLEVEL_OUTOF10: 10
PAINLEVEL_OUTOF10: 10
PAINLEVEL_OUTOF10: 8

## 2017-08-29 NOTE — PROGRESS NOTES
08/29/2017 1657 - Discharge Outreach  - received call from patient - states she was discharged today from [facility] - states she thought the doctor said he was going to discharge her home with Abx and Flagyl. No Rx for those medications received. No Dc summary in computer yet. Transferred call to [facility]. No other questions.

## 2017-08-29 NOTE — PROGRESS NOTES
Report received from night shift RN and care assumed at 0700.  Pt A+Ox4, lots of pain complaints and moaning in bed. Administered oxy and scheduled ativan. Will continue to monitor pain.   Lungs clear and now on RA.   LUQ MICHELLE intact under dressing. Serosanguinous drainage. Will teach patient and family how to care for MICHELLE when discharging.   Midline incision covered with dressing, old drainage present.   BS hyperactive, -N/V, tolerating regular diet, voiding often- refused lasix this AM.   Right triple IJ in place and patent, saline locked now.   Pt steady on feet and ambulating around by self.   Pt anxious about leaving today, spoke to Dr. Lancaster about D/C plan.   Call light within reach at this time, and patient in bed.     Vitals:    08/28/17 1650 08/28/17 2000 08/29/17 0303 08/29/17 0804   BP: 127/78 121/79 134/84 136/88   Pulse: 77 87 81 71   Resp: 18 18 17 17   Temp: 36.8 °C (98.2 °F) 36.4 °C (97.5 °F) 36.5 °C (97.7 °F) 36.6 °C (97.9 °F)   SpO2: 93% 91% 99% 96%   Weight:       Height:

## 2017-08-29 NOTE — PROGRESS NOTES
A/Ox4, VSS, sitting up in bed, very pleasant, family at bedside.  Pt c/o pain consistent with what she's previously experienced. Medicated per MAR.  Abdominal MLI dressing cdi, some serosang old drainage.  RA, on 1-2L NC while sleeping for intermittent desaturation.  Up with 1x assist, ambulating self.  Regular diet, tolerated well.  +void, frequent, urgency.  -bm this shift, lbm 08/28/2017, loose per pt.  Labs drawn this am.  Call light within reach, calls appropriately.  Bed in low and locked position. Non-skid socks on.  All needs met at this time.

## 2017-08-29 NOTE — DISCHARGE INSTRUCTIONS
Discharge Instructions    Discharged to home by car with relative. Discharged via wheelchair, hospital escort: Yes.  Special equipment needed: Not Applicable    Be sure to schedule a follow-up appointment with your primary care doctor or any specialists as instructed.     Discharge Plan:   Diet Plan: Discussed  Activity Level: Discussed  Confirmed Follow up Appointment: Patient to Call and Schedule Appointment  Confirmed Symptoms Management: Discussed  Medication Reconciliation Updated: Yes  Influenza Vaccine Indication: Not indicated: Previously immunized this influenza season and > 8 years of age    I understand that a diet low in cholesterol, fat, and sodium is recommended for good health. Unless I have been given specific instructions below for another diet, I accept this instruction as my diet prescription.   Other diet: Regular    Special Instructions: None    · Is patient discharged on Warfarin / Coumadin?   No     · Is patient Post Blood Transfusion?  No    Depression / Suicide Risk    As you are discharged from this [facility] facility, it is important to learn how to keep safe from harming yourself.    Recognize the warning signs:  · Abrupt changes in personality, positive or negative- including increase in energy   · Giving away possessions  · Change in eating patterns- significant weight changes-  positive or negative  · Change in sleeping patterns- unable to sleep or sleeping all the time   · Unwillingness or inability to communicate  · Depression  · Unusual sadness, discouragement and loneliness  · Talk of wanting to die  · Neglect of personal appearance   · Rebelliousness- reckless behavior  · Withdrawal from people/activities they love  · Confusion- inability to concentrate     If you or a loved one observes any of these behaviors or has concerns about self-harm, here's what you can do:  · Talk about it- your feelings and reasons for harming yourself  · Remove any means that you might use to hurt  yourself (examples: pills, rope, extension cords, firearm)  · Get professional help from the community (Mental Health, Substance Abuse, psychological counseling)  · Do not be alone:Call your Safe Contact- someone whom you trust who will be there for you.  · Call your local CRISIS HOTLINE 869-8026 or 378-725-0758  · Call your local Children's Mobile Crisis Response Team [place] (962) 934-1370 or www.Fatwire  · Call the toll free National Suicide Prevention Hotlines   · National Suicide Prevention Lifeline 930-432-MEAA (7581)  · [place] Line Network 800-SUICIDE (652-7347)      Cholecystostomy  The gallbladder is a pear-shaped organ that lies beneath the liver on the right side of the body. The gallbladder stores bile, a fluid that helps the body digest fats. However, sometimes bile and other fluids build up in the gallbladder because of an obstruction (for example, a gallstones). This can cause fever, pain, swelling, nausea and other serious symptoms.  The procedure used to drain these fluids is called a cholecystostomy. A tube is inserted into the gallbladder. Fluid drains through the tube into a plastic bag outside the body. This procedure is usually done on people who are admitted to the hospital.  The procedure is often recommended for people who cannot have gallbladder surgery right away, usually because they are too ill to make it through surgery. The cholecystostomy tube is usually temporary, until surgery can be done.  RISKS AND COMPLICATIONS  Although rare, complications can include:  · Clogging of the tube.  · Infection in or around the drain site. Antibiotics might be prescribed for the infection. Or, another tube might be inserted to drain the infected fluid.  · Internal bleeding from the liver.  BEFORE THE PROCEDURE   · Try to quit smoking several weeks before the procedure. Smoking can slow healing.  · Arrange for someone to drive you home from the hospital.  · Right before your  procedure, avoid all foods and liquids after midnight. This includes coffee, tea and water.  · On the day of the procedure, arrive early to fill out all the paperwork.  PROCEDURE  You will be given a sedative to make you sleepy and a local anesthetic to numb the skin. Next, a small cut is made in the abdomen. Then a tube is threaded through the cut into the gallbladder. The procedure is usually done with ultrasound to guide the tube into the gallbladder. Once the tube is in place, the drain is secured to the skin with a stitch. The tube is then connected to a drainage bag.   AFTER THE PROCEDURE   · People who have a cholecystostomy usually stay in the hospital for several days because they are so ill. You might not be able to eat for the first few days. Instead, you will be connected to an IV for fluids and nutrients.  · The procedure does not cure the blockage that caused the fluid to build up in the first place. Because of this, the gallbladder will need to be removed in the future. The drain is removed at that time.  HOME CARE INSTRUCTIONS   Be sure to follow your healthcare provider's instructions carefully. You may shower but avoid tub baths and swimming until your caregiver says it is OK. Eat and drink according to the directions you have been given. And be sure to make all follow-up appointments.   Call your healthcare provider if you notice new pain, redness or swelling around the wound.  SEEK IMMEDIATE MEDICAL CARE IF:   · There is increased abdominal pain.  · Nausea or vomiting occurs.  · You develop a fever.  · The drainage tube comes out of the abdomen.     This information is not intended to replace advice given to you by your health care provider. Make sure you discuss any questions you have with your health care provider.     Document Released: 03/16/2010 Document Revised: 01/08/2016 Document Reviewed: 03/30/2016  [employer] Interactive Patient Education ©2016 [employer]        Pancreatic Cancer    Pancreatic cancer is an abnormal growth of tissue (tumor) in the pancreas that is cancerous (malignant). Unlike noncancerous (benign) tumors, malignant tumors can spread to other parts of your body. The pancreas is a gland located deep in the abdomen, between the stomach and the spine. The pancreas makes insulin and other hormones. These hormones help the body use or store the energy that comes from food. The pancreas also makes pancreatic juices. These juices contain enzymes that help digest food.  Most pancreatic cancers begin in the ducts that carry pancreatic juices. When cancer of the pancreas spreads (metastasizes) outside the pancreas, cancer cells are often found in nearby lymph nodes. If the cancer has reached these nodes, it means that cancer cells may have spread to other lymph nodes or other tissues, such as the liver or lungs. Sometimes cancer of the pancreas spreads to the peritoneum. This is the tissue that lines the abdomen.  CAUSES   The exact cause of pancreatic cancer is unknown.   RISK FACTORS  There are a number of risk factors that can increase your chances of getting pancreatic cancer. They include:  · Age. The likelihood of developing pancreatic cancer increases with age. Most pancreatic cancers occur in people older than 60 years.  · Smoking. Cigarette smokers are 2 to 3 times more likely than nonsmokers to develop pancreatic cancer.  · Diabetes, especially if you were diagnosed as an adult.  · Being male.  · Being .  · Family history. The risk for developing pancreatic cancer triples if a person's mother, father, sister, or brother had the disease. Also, a family history of colon cancer or ovarian cancer increases the risk of pancreatic cancer.  · Chronic pancreatitis. Chronic pancreatitis is a painful condition of the pancreas.  · Exposure to certain chemicals in the workplace.  · Being obese or eating a diet high in fat and red meat.  SYMPTOMS   Pancreatic cancer is  "sometimes called a \"silent disease.\" This is because early pancreatic cancer often does not cause symptoms. As the cancer grows, symptoms may include:  · Weakness.  · Abdominal pain.  · Diarrhea.  · Depression.  · Loss of appetite.  · Indigestion.  · Pain in the upper abdomen or upper back.  · Nausea and vomiting.  · Yellowing of the skin or eyes (jaundice).  · Back pain.  · Weight loss.  · Fatigue.  · Black-colored stools.  · Unexplained blood clots.  · Dark urine.  DIAGNOSIS   Your caregiver will ask about your medical history. He or she may also perform a number of procedures, such as:  · A physical exam. Your skin and eyes will be examined for signs of jaundice. The abdomen will be checked for changes in the area near the pancreas, liver, and gallbladder. Your caregiver will also check for an abnormal buildup of fluid in the abdomen (ascites).  · Lab tests. Your caregiver may take blood and urine samples to check for bilirubin and other substances. Bilirubin is a substance that passes from the liver to the intestine through the gallbladder and bile duct. If the bile duct is blocked by a tumor, the bilirubin cannot pass through normally. Blockage of the bile duct may cause the level of bilirubin in the blood and urine to become very high.  · Computed tomography (CT). An X-ray machine linked to a computer takes a series of detailed pictures of the pancreas and other organs and blood vessels in the abdomen.  · Ultrasonography. The ultrasound device uses sound waves to produce pictures of the pancreas and other organs inside the abdomen.  · Endoscopic retrograde cholangiopancreatography. A lighted tube (endoscope) is passed through the patient's mouth and stomach, down into the small intestine. Then a smaller tube (catheter) is inserted through the endoscope into the bile ducts and pancreatic ducts. A dye is injected through the catheter into the ducts and X-rays are taken. The X-rays can show whether the ducts are " narrowed or blocked by a tumor.  · Endoscopic ultrasonography. An endoscope is passed through the patient's mouth and stomach, down into the small intestine. An ultrasound device is placed down the endoscope to produce pictures of the area, including the pancreas.  · Percutaneous transhepatic cholangiography. A dye is injected through a thin needle into the liver and X-rays are taken. Unless there is a blockage, the dye should move freely through the bile ducts. From the X-rays, your caregiver can tell whether there is a blockage from a tumor.  · Taking a tissue sample (biopsy) from the pancreas. The sample is examined under a microscope to look for cancer cells.  Your cancer will be staged according to its severity and extent. Staging is a careful attempt to categorize your cancer to help determine which treatment will be most appropriate. Factors involved in staging include the size of the tumor, whether the cancer has spread, and if so, to what parts of the body it has spread. You may need to have more tests to determine the stage of your cancer. The test results will help determine what treatment plan is best for you.  STAGES   · Stage I. The cancer is only found in the pancreas.  · Stage II. The cancer has spread to nearby tissues and possibly to the lymph nodes, but not to the blood vessels.  · Stage III. The cancer is surrounding the major blood vessels beside the pancreas and has possibly spread to the lymph nodes.  · Stage IV. The cancer has spread to other parts of the body, such as the liver, lungs, or peritoneum.  TREATMENT   Treatment generally begins within several weeks after the diagnosis. There will be time to talk with your caregiver about treatment choices, get a second opinion, and learn more about the disease. Your caregiver may refer you to a cancer specialist (oncologist).  · Cancer of the pancreas is very hard to control with current treatments. For that reason, many caregivers encourage  patients with this disease to consider taking part in a clinical trial. Clinical trials are an important option for people with all stages of pancreatic cancer.  · At this time, pancreatic cancer can be cured only when it is found at an early stage, before it has spread. However, other treatments may be able to control the disease and help patients live longer and feel better. When a cure or control of the disease is not possible, some patients choose palliative therapy. Palliative therapy aims to improve quality of life by controlling pain and other problems caused by this disease, but it does not cure the disease.  Depending on the type and stage, pancreatic cancer may be treated with surgery, radiation therapy, or chemotherapy. Some patients have a combination of these therapies.  · Surgery may be done to remove all or part of the pancreas. Sometimes the cancer cannot be completely removed. However, if the tumor is blocking the common bile duct or duodenum, the surgeon can place a mesh tube (stent) in the blocked area. This helps keep the duct or duodenum open.  · Radiation therapy uses high-energy rays to kill cancer cells.  · Chemotherapy is the use of drugs to kill cancer cells. Caregivers also give chemotherapy to help reduce pain and other problems caused by pancreatic cancer.  HOME CARE INSTRUCTIONS   · Only take over-the-counter or prescription medicines for pain, discomfort, or fever as directed by your caregiver.  · Maintain a healthy diet.  · Consider joining a support group. This may help you learn to cope with the stress of having pancreatic cancer.  · Seek advice to help you manage treatment side effects.  · Keep all follow-up appointments as directed by your caregiver.  SEEK IMMEDIATE MEDICAL CARE IF:   · You have a sudden increase in pain.  · Your skin or eyes turn more yellow.  · You lose weight without trying.  · You have a fever, especially during chemotherapy treatment or after stent  placement.  · You notice new fatigue or weakness.     This information is not intended to replace advice given to you by your health care provider. Make sure you discuss any questions you have with your health care provider.     Document Released: 12/02/2005 Document Revised: 01/08/2016 Document Reviewed: 01/11/2013  [employer] Interactive Patient Education ©2016 [employer]      Bulb Drain Home Care  A bulb drain consists of a thin rubber tube and a soft, round bulb that creates a gentle suction. The rubber tube is placed in the area where you had surgery. A bulb is attached to the end of the tube that is outside the body. The bulb drain removes excess fluid that normally builds up in a surgical wound after surgery. The color and amount of fluid will vary. Immediately after surgery, the fluid is bright red and is a little thicker than water. It may gradually change to a yellow or pink color and become more thin and water-like. When the amount decreases to about 1 or 2 tbsp in 24 hours, your health care provider will usually remove it.  DAILY CARE  · Keep the bulb flat (compressed) at all times, except while emptying it. The flatness creates suction. You can flatten the bulb by squeezing it firmly in the middle and then closing the cap.  · Keep sites where the tube enters the skin dry and covered with a bandage (dressing).  · Secure the tube 1-2 in (2.5-5.1 cm) below the insertion sites to keep it from pulling on your stitches. The tube is stitched in place and will not slip out.  · Secure the bulb as directed by your health care provider.  · For the first 3 days after surgery, there usually is more fluid in the bulb. Empty the bulb whenever it becomes half full because the bulb does not create enough suction if it is too full. The bulb could also overflow. Write down how much fluid you remove each time you empty your drain. Add up the amount removed in 24 hours.  · Empty the bulb at the same time every day once the  amount of fluid decreases and you only need to empty it once a day. Write down the amounts and the 24-hour totals to give to your health care provider. This helps your health care provider know when the tubes can be removed.  EMPTYING THE BULB DRAIN  Before emptying the bulb, get a measuring cup, a piece of paper and a pen, and wash your hands.  · Gently run your fingers down the tube (stripping) to empty any drainage from the tubing into the bulb. This may need to be done several times a day to clear the tubing of clots and tissue.  · Open the bulb cap to release suction, which causes it to inflate. Do not touch the inside of the cap.  · Gently run your fingers down the tube (stripping) to empty any drainage from the tubing into the bulb.  · Hold the cap out of the way, and pour fluid into the measuring cup.    · Squeeze the bulb to provide suction.   · Replace the cap.    · Check the tape that holds the tube to your skin. If it is becoming loose, you can remove the loose piece of tape and apply a new one. Then, pin the bulb to your shirt.    · Write down the amount of fluid you emptied out. Write down the date and each time you emptied your bulb drain. (If there are 2 bulbs, note the amount of drainage from each bulb and keep the totals separate. Your health care provider will want to know the total amounts for each drain and which tube is draining more.)    · Flush the fluid down the toilet and wash your hands.    · Call your health care provider once you have less than 2 tbsp of fluid collecting in the bulb drain every 24 hours.  If there is drainage around the tube site, change dressings and keep the area dry. Cleanse around tube with sterile saline and place dry gauze around site. This gauze should be changed when it is soiled. If it stays clean and unsoiled, it should still be changed daily.   SEEK MEDICAL CARE IF:  · Your drainage has a bad smell or is cloudy.    · You have a fever.    · Your drainage is  increasing instead of decreasing.    · Your tube fell out.    · You have redness or swelling around the tube site.    · You have drainage from a surgical wound.    · Your bulb drain will not stay flat after you empty it.    MAKE SURE YOU:   · Understand these instructions.  · Will watch your condition.  · Will get help right away if you are not doing well or get worse.     This information is not intended to replace advice given to you by your health care provider. Make sure you discuss any questions you have with your health care provider.     Document Released: 12/15/2001 Document Revised: 01/08/2016 Document Reviewed: 05/22/2013  [employer] Interactive Patient Education ©2016 [employer]

## 2017-08-29 NOTE — PROGRESS NOTES
Ellie from Lab called with critical result of Platelet 1186 at 0410. Critical lab result read back to Ellie.   Dr. Sibley aware of critical platelet values. Critical platelet values trending down and improving. No new orders needed.

## 2017-08-29 NOTE — PROGRESS NOTES
Discussed D/C plan with patient, no further questions. Discussed with patient and family how to care for MICHELLE drain and how to empty drain. Changed abd dressing. Told patient she needs to call the office to make an appointment with the MD regarding drain and incision. Removed central line.

## 2017-08-30 LAB
BACTERIA BLD CULT: NORMAL
BACTERIA BLD CULT: NORMAL
BACTERIA WND AEROBE CULT: NORMAL
GRAM STN SPEC: NORMAL
SIGNIFICANT IND 70042: NORMAL
SITE SITE: NORMAL
SOURCE SOURCE: NORMAL

## 2017-08-30 ASSESSMENT — ENCOUNTER SYMPTOMS
CHILLS: 0
HEADACHES: 0
WEAKNESS: 1
BLURRED VISION: 0
NECK PAIN: 0
DEPRESSION: 0
MYALGIAS: 0
ABDOMINAL PAIN: 1
COUGH: 0
FEVER: 0
DIARRHEA: 0
NAUSEA: 0
WEIGHT LOSS: 0
DIZZINESS: 0
LOSS OF CONSCIOUSNESS: 0
VOMITING: 0
FOCAL WEAKNESS: 0

## 2017-08-30 NOTE — PROGRESS NOTES
[facility] Hospitalist Progress Note    Date of Service: 2017    Chief Complaint  57 y.o. female admitted 2017 with abdominal cellulitis and intra abdominal fluid collections    Interval Problem Update  Pt seen and examined no acute events overnight afebrile.   Abdominal issues-s/p drainage of the abdomen and MICHELLE drain in place. Modest quantity of fluid has been obtained, appears pancreatic in nature. Afebrile. Belly pain is much better controlled today. Tolerating breakfast without any real issue today.  All cultures are ngtd.    Consultants/Specialty  -General surgery    Disposition  Surgical floor for now        Review of Systems   Constitutional: Positive for malaise/fatigue (a little better today). Negative for chills, fever and weight loss.        No clear change noted today   Eyes: Negative for blurred vision.   Respiratory: Negative for cough.    Cardiovascular: Negative for chest pain.   Gastrointestinal: Positive for abdominal pain (much less intense today). Negative for diarrhea, nausea and vomiting.   Genitourinary: Negative for dysuria and hematuria.   Musculoskeletal: Negative for myalgias and neck pain.   Skin: Positive for rash.   Neurological: Positive for weakness (much stronger today, ambulating in the hallway). Negative for dizziness, focal weakness, loss of consciousness and headaches.   Psychiatric/Behavioral: Negative for depression.   All other systems reviewed and are negative.     Physical Exam  Laboratory/Imaging   Hemodynamics  No data recorded.      Pulse  Av.8  Min: 71  Max: 103           Respiratory                    Fluids  No intake or output data in the 24 hours ending 17 1632    Nutrition  No orders of the defined types were placed in this encounter.    Physical Exam   Constitutional: She is oriented to person, place, and time. She appears well-developed and well-nourished. No distress.   In obvious pain   HENT:   Head: Normocephalic and atraumatic.   Mouth/Throat:  No oropharyngeal exudate.   Eyes: Pupils are equal, round, and reactive to light. No scleral icterus.   Neck: Normal range of motion. Neck supple.   Cardiovascular: Normal rate, regular rhythm, normal heart sounds and intact distal pulses.    No murmur heard.  Pulmonary/Chest: Effort normal and breath sounds normal. No stridor. No respiratory distress.   Abdominal: Soft. She exhibits no distension. There is tenderness.   Large mid line vertical abdominal incision, MICHELLE drain in place, moderate brown colored fluid present, tenderness is much less intense today    Erythema along the incisional site continues to recede   Musculoskeletal: Normal range of motion. She exhibits no edema.   Neurological: She is alert and oriented to person, place, and time. No cranial nerve deficit.   Skin: Skin is warm and dry. No rash noted.   Psychiatric: She has a normal mood and affect.   Nursing note and vitals reviewed.      Recent Labs      08/28/17 0338 08/29/17 0315   WBC  12.1*  9.6   RBC  3.18*  3.28*   HEMOGLOBIN  8.9*  9.2*   HEMATOCRIT  28.7*  29.2*   MCV  90.3  89.0   MCH  28.0  28.0   MCHC  31.0*  31.5*   RDW  54.4*  52.9*   PLATELETCT  1307*  1186*   MPV  9.4  9.1     Recent Labs      08/28/17 0338  08/29/17 0315   SODIUM  137  137   POTASSIUM  2.9*  3.4*   CHLORIDE  99  98   CO2  29  29   GLUCOSE  86  88   BUN  2*  2*   CREATININE  0.36*  0.31*   CALCIUM  8.3*  8.5                      Assessment/Plan     Intra-abdominal fluid collection- (present on admission)   Assessment & Plan    -See above        Hypokalemia- (present on admission)   Assessment & Plan    -resolved, monitor closely          Quality-Core Measures   DVT-SCD's

## 2017-08-30 NOTE — ADDENDUM NOTE
Encounter addended by: Elizabeth L. Sawallisch on: 8/30/2017 12:21 PM<BR>    Actions taken: Pend clinical note

## 2017-08-30 NOTE — DOCUMENTATION QUERY
"DOCUMENTATION QUERY     PROVIDERS: Please select “Cosign w/ note”to reply to query.     To better represent the severity of illness of your patient, please review the following information and exercise your independent professional judgment in responding to this query.      Per coding guidelines, codes may not be assigned from a pathology report without the attending physician's confirmation.    The patient presented with a malignant neoplasm, of the body of the pancreas. A extended distal pancreatic splenectomy, cholecystectomy, and excision of the portal vein were performed and pathology documented the following:    \"Moderately differentiated adenocarcinoma of pancreas, tumors close to splenic artery/celiac trunk margin\" and \"Focal moderately differentiated adenocarcinoma, not present on margin.\" Also documented in the pathology is \"One lymph node positive for metastatic carcinoma.\"     Based on the pathology report, please document any diagnoses that support the pathology findings.     Please chose all that apply:  -Primary Malignant Tumor (please document site)  -Malignant Secondary Tumor (please document sites)  -Other explanation of clinical findings (please document)  -Unable to determine        The medical record reflects the following:   Clinical Findings    Treatment    Risk Factors    Location within medical record Pathology Reports      Thank you,   Elizabeth Sawallisch        This makes no sense- please be clear as to what you need.  It clearly states in my admit orders that she has pancreatic cancer, the path confirms it.  The attending pathologist reads the path not me.  "

## 2017-08-30 NOTE — DISCHARGE SUMMARY
DATE OF ADMISSION:  08/25/2017.    DATE OF DISCHARGE:  08/29/2017.    HISTORY OF PRESENT ILLNESS:  Patient is a 57-year-old female patient who is   known to me status post extended distal pancreatectomy and splenectomy with   portal vein resection back on 08/09/2017.  She went home, but then was   readmitted to the hospital after having been seen in the office with no sign   of infection and her abdominal pain, she was seen through the emergency room   with small drainage from her abdominal incision.  She underwent a CT scan   which revealed a large fluid collection around the head of the pancreas   consistent with a delayed pancreatic leak.  So Dr. Black had taken the   patient to the operating room and placed a drain.  She has done extremely well   since then.  Her cultures have grown nothing, so she does not need any more   antibiotics.  She is being discharged home on Roxicodone 5 mg 1 tablet p.o. q.   3 hours p.r.n. pain 40 with no refills with instructions that this is not to   be filled by the pharmacist until she has completed her home narcotics from   her postoperative treatment.  She is also going home on ketorolac 10 mg p.o.   q. 6 hours p.r.n. pain, with _____ refill.  Her last creatinine was normal and   she has tolerated Toradol without any difficulty, last creatinine was 0.31.    PHYSICAL EXAMINATION:  Prior to discharge,  HEENT:  Extraocular movements are intact.  No sign of jaundice.  NECK:  Soft and supple.  There is no cervical, supraclavicular, or occipital   lymphadenopathy.  LUNGS:  Clear to auscultation.  Good inspiratory effort.  CARDIOVASCULAR:  Regular rate and rhythm.  ABDOMEN:  Soft.  Her wound is clean, dry and intact except for the opening   where the MICHELLE drain has been placed.  She has been taught how to manage the MICHELLE   drain record and empty and drain and place the negative pressure.  It was   recommended for home health, but the patient states her and her daughter will   do this  on their own, and they do not want home health.  I did instruct her if   she develops any fever, chills, to call me immediately and that she should   see me in the office on September 12th and that she needs to call and make an   appointment.  In the meantime, she has been discharged to follow up as an   outpatient.       ____________________________________     MD WICHO RAMEY / ADRIAN    DD:  08/29/2017 12:26:24  DT:  08/29/2017 18:15:32    D#:  [id number]  Job#:  [id number]

## 2017-08-31 ENCOUNTER — HOSPITAL ENCOUNTER (OUTPATIENT)
Dept: HOSPITAL 8 - ROC | Age: 57
Discharge: HOME | End: 2017-08-31
Attending: RADIOLOGY
Payer: COMMERCIAL

## 2017-08-31 DIAGNOSIS — Z90.3: ICD-10-CM

## 2017-08-31 DIAGNOSIS — C25.1: Primary | ICD-10-CM

## 2017-08-31 LAB — JAK2 P.V617F MUT/NOR BLD/T: 0 %

## 2017-08-31 PROCEDURE — G0463 HOSPITAL OUTPT CLINIC VISIT: HCPCS

## 2017-08-31 PROCEDURE — 99213 OFFICE O/P EST LOW 20 MIN: CPT

## 2017-09-01 ENCOUNTER — HOSPITAL ENCOUNTER (OUTPATIENT)
Dept: RADIOLOGY | Facility: MEDICAL CENTER | Age: 57
End: 2017-09-01

## 2017-09-01 LAB
BACTERIA SPEC ANAEROBE CULT: NORMAL
SIGNIFICANT IND 70042: NORMAL
SITE SITE: NORMAL
SOURCE SOURCE: NORMAL

## 2017-09-05 NOTE — ADDENDUM NOTE
Encounter addended by: Elizabeth L. Sawallisch on: 9/5/2017  8:14 AM<BR>    Actions taken: Sign clinical note

## 2017-09-12 ENCOUNTER — HOSPITAL ENCOUNTER (OUTPATIENT)
Dept: HOSPITAL 8 - RAD | Age: 57
Discharge: HOME | End: 2017-09-12
Attending: SURGERY
Payer: COMMERCIAL

## 2017-09-12 DIAGNOSIS — K76.0: ICD-10-CM

## 2017-09-12 DIAGNOSIS — M47.896: ICD-10-CM

## 2017-09-12 DIAGNOSIS — I70.209: ICD-10-CM

## 2017-09-12 DIAGNOSIS — Z46.82: Primary | ICD-10-CM

## 2017-09-12 DIAGNOSIS — C25.1: ICD-10-CM

## 2017-09-12 DIAGNOSIS — K91.89: ICD-10-CM

## 2017-09-12 PROCEDURE — 74170 CT ABD WO CNTRST FLWD CNTRST: CPT

## 2017-10-09 ENCOUNTER — HOSPITAL ENCOUNTER (OUTPATIENT)
Dept: HOSPITAL 8 - ROC | Age: 57
End: 2017-10-09
Attending: RADIOLOGY
Payer: COMMERCIAL

## 2017-10-09 DIAGNOSIS — Z02.9: Primary | ICD-10-CM

## 2017-12-07 ENCOUNTER — HOSPITAL ENCOUNTER (OUTPATIENT)
Dept: HOSPITAL 8 - ROC | Age: 57
Discharge: HOME | End: 2017-12-07
Attending: RADIOLOGY
Payer: COMMERCIAL

## 2017-12-07 DIAGNOSIS — C25.1: Primary | ICD-10-CM

## 2017-12-07 PROCEDURE — 99213 OFFICE O/P EST LOW 20 MIN: CPT

## 2017-12-07 PROCEDURE — G0463 HOSPITAL OUTPT CLINIC VISIT: HCPCS

## 2017-12-29 ENCOUNTER — DOCUMENTATION (OUTPATIENT)
Dept: OTHER | Facility: MEDICAL CENTER | Age: 57
End: 2017-12-29

## 2017-12-29 NOTE — PROGRESS NOTES
Confirmed with MATEO Cartagena at [facility] that patient is completing treatment at [facility] and [facility] and will receive cancer treatment summary / survivorship care plan from [facility] at the end of treatment if appropriate.

## 2018-01-03 ENCOUNTER — HOSPITAL ENCOUNTER (OUTPATIENT)
Dept: HOSPITAL 8 - ROC | Age: 58
Discharge: HOME | End: 2018-01-03
Attending: RADIOLOGY
Payer: COMMERCIAL

## 2018-01-03 DIAGNOSIS — C25.1: Primary | ICD-10-CM

## 2018-01-03 PROCEDURE — 99213 OFFICE O/P EST LOW 20 MIN: CPT

## 2018-01-03 PROCEDURE — G0463 HOSPITAL OUTPT CLINIC VISIT: HCPCS

## 2018-01-24 ENCOUNTER — HOSPITAL ENCOUNTER (OUTPATIENT)
Dept: HOSPITAL 8 - ROC | Age: 58
Discharge: HOME | End: 2018-01-24
Attending: RADIOLOGY
Payer: COMMERCIAL

## 2018-01-24 DIAGNOSIS — C25.1: Primary | ICD-10-CM

## 2018-01-24 PROCEDURE — G0463 HOSPITAL OUTPT CLINIC VISIT: HCPCS

## 2018-01-24 PROCEDURE — 99213 OFFICE O/P EST LOW 20 MIN: CPT

## 2018-02-02 ENCOUNTER — HOSPITAL ENCOUNTER (OUTPATIENT)
Dept: HOSPITAL 8 - CFH | Age: 58
Discharge: HOME | End: 2018-02-02
Attending: RADIOLOGY
Payer: COMMERCIAL

## 2018-02-02 DIAGNOSIS — Z90.49: ICD-10-CM

## 2018-02-02 DIAGNOSIS — C25.1: Primary | ICD-10-CM

## 2018-02-02 DIAGNOSIS — Z90.411: ICD-10-CM

## 2018-02-02 DIAGNOSIS — Z90.81: ICD-10-CM

## 2018-02-02 PROCEDURE — 74177 CT ABD & PELVIS W/CONTRAST: CPT

## 2018-02-02 PROCEDURE — 71260 CT THORAX DX C+: CPT

## 2018-02-05 ENCOUNTER — HOSPITAL ENCOUNTER (OUTPATIENT)
Dept: HOSPITAL 8 - ROC | Age: 58
Discharge: HOME | End: 2018-02-05
Attending: RADIOLOGY
Payer: COMMERCIAL

## 2018-02-05 DIAGNOSIS — Z98.890: ICD-10-CM

## 2018-02-05 DIAGNOSIS — C25.1: Primary | ICD-10-CM

## 2018-02-05 PROCEDURE — 99212 OFFICE O/P EST SF 10 MIN: CPT

## 2018-02-05 PROCEDURE — G0463 HOSPITAL OUTPT CLINIC VISIT: HCPCS

## 2018-05-03 ENCOUNTER — HOSPITAL ENCOUNTER (OUTPATIENT)
Dept: HOSPITAL 8 - CFH | Age: 58
End: 2018-05-03
Attending: INTERNAL MEDICINE
Payer: COMMERCIAL

## 2018-05-03 DIAGNOSIS — C25.1: Primary | ICD-10-CM

## 2018-05-03 PROCEDURE — 82565 ASSAY OF CREATININE: CPT

## 2018-05-03 PROCEDURE — 74177 CT ABD & PELVIS W/CONTRAST: CPT

## 2018-05-03 PROCEDURE — 71260 CT THORAX DX C+: CPT

## 2018-06-06 ENCOUNTER — HOSPITAL ENCOUNTER (OUTPATIENT)
Dept: HOSPITAL 8 - OUT | Age: 58
Discharge: HOME | End: 2018-06-06
Attending: INTERNAL MEDICINE
Payer: MEDICAID

## 2018-06-06 VITALS — DIASTOLIC BLOOD PRESSURE: 77 MMHG | SYSTOLIC BLOOD PRESSURE: 149 MMHG

## 2018-06-06 VITALS — HEIGHT: 65 IN | BODY MASS INDEX: 18.11 KG/M2 | WEIGHT: 108.69 LBS

## 2018-06-06 DIAGNOSIS — C25.1: ICD-10-CM

## 2018-06-06 DIAGNOSIS — F41.9: ICD-10-CM

## 2018-06-06 DIAGNOSIS — Z98.890: ICD-10-CM

## 2018-06-06 DIAGNOSIS — Z45.2: Primary | ICD-10-CM

## 2018-06-06 DIAGNOSIS — F19.90: ICD-10-CM

## 2018-06-06 DIAGNOSIS — Z87.39: ICD-10-CM

## 2018-06-06 DIAGNOSIS — F32.9: ICD-10-CM

## 2018-06-06 PROCEDURE — 99157 MOD SED OTHER PHYS/QHP EA: CPT

## 2018-06-06 PROCEDURE — 36590 REMOVAL TUNNELED CV CATH: CPT

## 2018-06-06 PROCEDURE — 99156 MOD SED OTH PHYS/QHP 5/>YRS: CPT

## 2018-06-06 PROCEDURE — 77001 FLUOROGUIDE FOR VEIN DEVICE: CPT

## 2018-06-11 ENCOUNTER — HOSPITAL ENCOUNTER (OUTPATIENT)
Dept: HOSPITAL 8 - ROC | Age: 58
Discharge: HOME | End: 2018-06-11
Attending: RADIOLOGY
Payer: MEDICAID

## 2018-06-11 DIAGNOSIS — C25.1: Primary | ICD-10-CM

## 2018-06-11 PROCEDURE — 99213 OFFICE O/P EST LOW 20 MIN: CPT

## 2018-06-11 PROCEDURE — G0463 HOSPITAL OUTPT CLINIC VISIT: HCPCS

## 2018-07-15 ENCOUNTER — HOSPITAL ENCOUNTER (EMERGENCY)
Dept: HOSPITAL 8 - ED | Age: 58
Discharge: HOME | End: 2018-07-15
Payer: MEDICAID

## 2018-07-15 VITALS — WEIGHT: 111.11 LBS | BODY MASS INDEX: 18.51 KG/M2 | HEIGHT: 65 IN

## 2018-07-15 VITALS — SYSTOLIC BLOOD PRESSURE: 145 MMHG | DIASTOLIC BLOOD PRESSURE: 77 MMHG

## 2018-07-15 DIAGNOSIS — K59.00: Primary | ICD-10-CM

## 2018-07-15 DIAGNOSIS — R11.0: ICD-10-CM

## 2018-07-15 DIAGNOSIS — F17.200: ICD-10-CM

## 2018-07-15 LAB
ALBUMIN SERPL-MCNC: 3.6 G/DL (ref 3.4–5)
ALP SERPL-CCNC: 80 U/L (ref 45–117)
ALT SERPL-CCNC: 37 U/L (ref 12–78)
ANION GAP SERPL CALC-SCNC: 7 MMOL/L (ref 5–15)
BASOPHILS # BLD AUTO: 0.05 X10^3/UL (ref 0–0.1)
BASOPHILS NFR BLD AUTO: 1 % (ref 0–1)
BILIRUB SERPL-MCNC: 0.4 MG/DL (ref 0.2–1)
CALCIUM SERPL-MCNC: 9.3 MG/DL (ref 8.5–10.1)
CHLORIDE SERPL-SCNC: 104 MMOL/L (ref 98–107)
CREAT SERPL-MCNC: 0.85 MG/DL (ref 0.55–1.02)
EOSINOPHIL # BLD AUTO: 0.11 X10^3/UL (ref 0–0.4)
EOSINOPHIL NFR BLD AUTO: 1 % (ref 1–7)
ERYTHROCYTE [DISTWIDTH] IN BLOOD BY AUTOMATED COUNT: 14.4 % (ref 9.6–15.2)
LYMPHOCYTES # BLD AUTO: 2.56 X10^3/UL (ref 1–3.4)
LYMPHOCYTES NFR BLD AUTO: 30 % (ref 22–44)
MCH RBC QN AUTO: 31.1 PG (ref 27–34.8)
MCHC RBC AUTO-ENTMCNC: 32.8 G/DL (ref 32.4–35.8)
MCV RBC AUTO: 94.8 FL (ref 80–100)
MD: NO
MONOCYTES # BLD AUTO: 0.84 X10^3/UL (ref 0.2–0.8)
MONOCYTES NFR BLD AUTO: 10 % (ref 2–9)
NEUTROPHILS # BLD AUTO: 5.01 X10^3/UL (ref 1.8–6.8)
NEUTROPHILS NFR BLD AUTO: 59 % (ref 42–75)
PLATELET # BLD AUTO: 343 X10^3/UL (ref 130–400)
PMV BLD AUTO: 8.2 FL (ref 7.4–10.4)
PROT SERPL-MCNC: 7 G/DL (ref 6.4–8.2)
RBC # BLD AUTO: 4.45 X10^6/UL (ref 3.82–5.3)

## 2018-07-15 PROCEDURE — 85025 COMPLETE CBC W/AUTO DIFF WBC: CPT

## 2018-07-15 PROCEDURE — 80053 COMPREHEN METABOLIC PANEL: CPT

## 2018-07-15 PROCEDURE — 36415 COLL VENOUS BLD VENIPUNCTURE: CPT

## 2018-07-15 PROCEDURE — 74022 RADEX COMPL AQT ABD SERIES: CPT

## 2018-07-15 PROCEDURE — 99285 EMERGENCY DEPT VISIT HI MDM: CPT

## 2018-07-15 PROCEDURE — 83690 ASSAY OF LIPASE: CPT

## 2018-08-13 ENCOUNTER — HOSPITAL ENCOUNTER (OUTPATIENT)
Dept: HOSPITAL 8 - CFH | Age: 58
Discharge: HOME | End: 2018-08-13
Attending: RADIOLOGY
Payer: MEDICAID

## 2018-08-13 DIAGNOSIS — Z90.411: ICD-10-CM

## 2018-08-13 DIAGNOSIS — N28.89: ICD-10-CM

## 2018-08-13 DIAGNOSIS — K66.9: Primary | ICD-10-CM

## 2018-08-13 DIAGNOSIS — Z90.49: ICD-10-CM

## 2018-08-13 DIAGNOSIS — C25.1: ICD-10-CM

## 2018-08-13 PROCEDURE — 74177 CT ABD & PELVIS W/CONTRAST: CPT

## 2018-08-13 PROCEDURE — 71260 CT THORAX DX C+: CPT

## 2018-09-12 ENCOUNTER — HOSPITAL ENCOUNTER (OUTPATIENT)
Dept: HOSPITAL 8 - ROC | Age: 58
Discharge: HOME | End: 2018-09-12
Attending: RADIOLOGY
Payer: MEDICAID

## 2018-09-12 DIAGNOSIS — C25.1: Primary | ICD-10-CM

## 2018-09-12 DIAGNOSIS — Z87.891: ICD-10-CM

## 2018-09-12 PROCEDURE — 99213 OFFICE O/P EST LOW 20 MIN: CPT

## 2018-09-12 PROCEDURE — G0463 HOSPITAL OUTPT CLINIC VISIT: HCPCS

## 2018-10-08 ENCOUNTER — HOSPITAL ENCOUNTER (INPATIENT)
Dept: HOSPITAL 8 - ED | Age: 58
LOS: 6 days | Discharge: HOME | DRG: 871 | End: 2018-10-14
Attending: HOSPITALIST | Admitting: HOSPITALIST
Payer: COMMERCIAL

## 2018-10-08 VITALS — HEIGHT: 65 IN | WEIGHT: 133.16 LBS | BODY MASS INDEX: 22.19 KG/M2

## 2018-10-08 VITALS — SYSTOLIC BLOOD PRESSURE: 91 MMHG | DIASTOLIC BLOOD PRESSURE: 60 MMHG

## 2018-10-08 VITALS — SYSTOLIC BLOOD PRESSURE: 101 MMHG | DIASTOLIC BLOOD PRESSURE: 66 MMHG

## 2018-10-08 DIAGNOSIS — B95.3: ICD-10-CM

## 2018-10-08 DIAGNOSIS — Z80.9: ICD-10-CM

## 2018-10-08 DIAGNOSIS — E87.2: ICD-10-CM

## 2018-10-08 DIAGNOSIS — Z90.89: ICD-10-CM

## 2018-10-08 DIAGNOSIS — R65.20: ICD-10-CM

## 2018-10-08 DIAGNOSIS — J98.11: ICD-10-CM

## 2018-10-08 DIAGNOSIS — J18.1: ICD-10-CM

## 2018-10-08 DIAGNOSIS — R73.9: ICD-10-CM

## 2018-10-08 DIAGNOSIS — Z90.49: ICD-10-CM

## 2018-10-08 DIAGNOSIS — E44.0: ICD-10-CM

## 2018-10-08 DIAGNOSIS — Z85.07: ICD-10-CM

## 2018-10-08 DIAGNOSIS — F17.200: ICD-10-CM

## 2018-10-08 DIAGNOSIS — Z83.3: ICD-10-CM

## 2018-10-08 DIAGNOSIS — J96.01: ICD-10-CM

## 2018-10-08 DIAGNOSIS — A41.9: Primary | ICD-10-CM

## 2018-10-08 DIAGNOSIS — E86.0: ICD-10-CM

## 2018-10-08 DIAGNOSIS — E87.1: ICD-10-CM

## 2018-10-08 LAB
<PLATELET ESTIMATE>: ADEQUATE
<PLT MORPHOLOGY>: (no result)
ALBUMIN SERPL-MCNC: 2.9 G/DL (ref 3.4–5)
ALP SERPL-CCNC: 77 U/L (ref 45–117)
ALT SERPL-CCNC: 33 U/L (ref 12–78)
ANION GAP SERPL CALC-SCNC: 9 MMOL/L (ref 5–15)
BAND#(MANUAL): 4.07 X10^3/UL
BILIRUB DIRECT SERPL-MCNC: NORMAL MG/DL
BILIRUB SERPL-MCNC: 1.1 MG/DL (ref 0.2–1)
CALCIUM SERPL-MCNC: 9 MG/DL (ref 8.5–10.1)
CHLORIDE SERPL-SCNC: 95 MMOL/L (ref 98–107)
CREAT SERPL-MCNC: 1.09 MG/DL (ref 0.55–1.02)
CULTURE INDICATED?: NO
ERYTHROCYTE [DISTWIDTH] IN BLOOD BY AUTOMATED COUNT: 14.3 % (ref 9.6–15.2)
LYMPH#(MANUAL): 4.66 X10^3/UL (ref 1–3.4)
LYMPHS% (MANUAL): 16 % (ref 22–44)
MCH RBC QN AUTO: 32 PG (ref 27–34.8)
MCHC RBC AUTO-ENTMCNC: 33.4 G/DL (ref 32.4–35.8)
MCV RBC AUTO: 96 FL (ref 80–100)
MD: YES
METAMYELOCYTES# (MANUAL): 0.29 X10^3/UL (ref 0–0)
METAMYELOCYTES% (MANUAL): 1 % (ref 0–1)
MICROSCOPIC: (no result)
MONOS#(MANUAL): 2.62 X10^3/UL (ref 0.3–2.7)
MONOS% (MANUAL): 9 % (ref 2–9)
NEUTS BAND NFR BLD: 14 % (ref 0–7)
PLATELET # BLD AUTO: 300 X10^3/UL (ref 130–400)
PMNS WITH VACUOLES: (no result)
PMV BLD AUTO: 8 FL (ref 7.4–10.4)
PROT SERPL-MCNC: 7.4 G/DL (ref 6.4–8.2)
RBC # BLD AUTO: 4.73 X10^6/UL (ref 3.82–5.3)
SEG#(MANUAL): 17.46 X10^3/UL (ref 1.8–6.8)
SEGS% (MANUAL): 60 % (ref 42–75)
SMUDGE CELLS # BLD: (no result) 10*3/UL
TROPONIN I SERPL-MCNC: < 0.015 NG/ML (ref 0–0.04)

## 2018-10-08 PROCEDURE — 70450 CT HEAD/BRAIN W/O DYE: CPT

## 2018-10-08 PROCEDURE — 87400 INFLUENZA A/B EACH AG IA: CPT

## 2018-10-08 PROCEDURE — 94640 AIRWAY INHALATION TREATMENT: CPT

## 2018-10-08 PROCEDURE — 36415 COLL VENOUS BLD VENIPUNCTURE: CPT

## 2018-10-08 PROCEDURE — 81001 URINALYSIS AUTO W/SCOPE: CPT

## 2018-10-08 PROCEDURE — 99291 CRITICAL CARE FIRST HOUR: CPT

## 2018-10-08 PROCEDURE — 87077 CULTURE AEROBIC IDENTIFY: CPT

## 2018-10-08 PROCEDURE — 83605 ASSAY OF LACTIC ACID: CPT

## 2018-10-08 PROCEDURE — 80048 BASIC METABOLIC PNL TOTAL CA: CPT

## 2018-10-08 PROCEDURE — 83690 ASSAY OF LIPASE: CPT

## 2018-10-08 PROCEDURE — 96375 TX/PRO/DX INJ NEW DRUG ADDON: CPT

## 2018-10-08 PROCEDURE — 87040 BLOOD CULTURE FOR BACTERIA: CPT

## 2018-10-08 PROCEDURE — 85025 COMPLETE CBC W/AUTO DIFF WBC: CPT

## 2018-10-08 PROCEDURE — 83735 ASSAY OF MAGNESIUM: CPT

## 2018-10-08 PROCEDURE — 87181 SC STD AGAR DILUTION PER AGT: CPT

## 2018-10-08 PROCEDURE — 71045 X-RAY EXAM CHEST 1 VIEW: CPT

## 2018-10-08 PROCEDURE — 87324 CLOSTRIDIUM AG IA: CPT

## 2018-10-08 PROCEDURE — 96361 HYDRATE IV INFUSION ADD-ON: CPT

## 2018-10-08 PROCEDURE — 84484 ASSAY OF TROPONIN QUANT: CPT

## 2018-10-08 PROCEDURE — 80053 COMPREHEN METABOLIC PANEL: CPT

## 2018-10-08 PROCEDURE — 87070 CULTURE OTHR SPECIMN AEROBIC: CPT

## 2018-10-08 PROCEDURE — 84145 PROCALCITONIN (PCT): CPT

## 2018-10-08 PROCEDURE — 93005 ELECTROCARDIOGRAM TRACING: CPT

## 2018-10-08 PROCEDURE — 87205 SMEAR GRAM STAIN: CPT

## 2018-10-08 PROCEDURE — 87184 SC STD DISK METHOD PER PLATE: CPT

## 2018-10-08 PROCEDURE — 96374 THER/PROPH/DIAG INJ IV PUSH: CPT

## 2018-10-08 PROCEDURE — 71275 CT ANGIOGRAPHY CHEST: CPT

## 2018-10-08 RX ADMIN — SODIUM CHLORIDE SCH MLS/HR: 0.9 INJECTION, SOLUTION INTRAVENOUS at 16:40

## 2018-10-08 RX ADMIN — DOXYCYCLINE SCH MLS/HR: 100 INJECTION, POWDER, LYOPHILIZED, FOR SOLUTION INTRAVENOUS at 18:00

## 2018-10-08 RX ADMIN — GUAIFENESIN SCH MG: 600 TABLET, EXTENDED RELEASE ORAL at 20:58

## 2018-10-08 RX ADMIN — HYDROCODONE BITARTRATE AND ACETAMINOPHEN SCH TAB: 5; 325 TABLET ORAL at 16:39

## 2018-10-08 RX ADMIN — NICOTINE SCH PATCH: 14 PATCH, EXTENDED RELEASE TRANSDERMAL at 20:58

## 2018-10-08 RX ADMIN — ENOXAPARIN SODIUM SCH MG: 40 INJECTION SUBCUTANEOUS at 16:40

## 2018-10-08 RX ADMIN — HYDROCODONE BITARTRATE AND ACETAMINOPHEN SCH TAB: 5; 325 TABLET ORAL at 20:00

## 2018-10-08 RX ADMIN — ALBUTEROL SULFATE SCH MG: 2.5 SOLUTION RESPIRATORY (INHALATION) at 17:15

## 2018-10-09 VITALS — SYSTOLIC BLOOD PRESSURE: 94 MMHG | DIASTOLIC BLOOD PRESSURE: 60 MMHG

## 2018-10-09 VITALS — DIASTOLIC BLOOD PRESSURE: 56 MMHG | SYSTOLIC BLOOD PRESSURE: 88 MMHG

## 2018-10-09 VITALS — DIASTOLIC BLOOD PRESSURE: 54 MMHG | SYSTOLIC BLOOD PRESSURE: 89 MMHG

## 2018-10-09 VITALS — SYSTOLIC BLOOD PRESSURE: 94 MMHG | DIASTOLIC BLOOD PRESSURE: 59 MMHG

## 2018-10-09 VITALS — DIASTOLIC BLOOD PRESSURE: 59 MMHG | SYSTOLIC BLOOD PRESSURE: 84 MMHG

## 2018-10-09 VITALS — DIASTOLIC BLOOD PRESSURE: 65 MMHG | SYSTOLIC BLOOD PRESSURE: 106 MMHG

## 2018-10-09 VITALS — SYSTOLIC BLOOD PRESSURE: 91 MMHG | DIASTOLIC BLOOD PRESSURE: 59 MMHG

## 2018-10-09 VITALS — SYSTOLIC BLOOD PRESSURE: 95 MMHG | DIASTOLIC BLOOD PRESSURE: 60 MMHG

## 2018-10-09 VITALS — DIASTOLIC BLOOD PRESSURE: 59 MMHG | SYSTOLIC BLOOD PRESSURE: 94 MMHG

## 2018-10-09 VITALS — SYSTOLIC BLOOD PRESSURE: 90 MMHG | DIASTOLIC BLOOD PRESSURE: 54 MMHG

## 2018-10-09 LAB
<PLATELET ESTIMATE>: ADEQUATE
<PLT MORPHOLOGY>: (no result)
ALBUMIN SERPL-MCNC: 2.3 G/DL (ref 3.4–5)
ALP SERPL-CCNC: 76 U/L (ref 45–117)
ALT SERPL-CCNC: 27 U/L (ref 12–78)
ANION GAP SERPL CALC-SCNC: 8 MMOL/L (ref 5–15)
BAND#(MANUAL): 3.01 X10^3/UL
BASOPHILS NFR BLD MANUAL: 1 % (ref 0–1)
BASOS#(MANUAL): 0.27 X10^3/UL (ref 0–0.1)
BILIRUB DIRECT SERPL-MCNC: NORMAL MG/DL
BILIRUB SERPL-MCNC: 0.8 MG/DL (ref 0.2–1)
CALCIUM SERPL-MCNC: 8 MG/DL (ref 8.5–10.1)
CHLORIDE SERPL-SCNC: 103 MMOL/L (ref 98–107)
CREAT SERPL-MCNC: 0.78 MG/DL (ref 0.55–1.02)
ERYTHROCYTE [DISTWIDTH] IN BLOOD BY AUTOMATED COUNT: 14.7 % (ref 9.6–15.2)
LYMPH#(MANUAL): 5.21 X10^3/UL (ref 1–3.4)
LYMPHS% (MANUAL): 19 % (ref 22–44)
MCH RBC QN AUTO: 32.1 PG (ref 27–34.8)
MCHC RBC AUTO-ENTMCNC: 33.1 G/DL (ref 32.4–35.8)
MCV RBC AUTO: 97.1 FL (ref 80–100)
MD: YES
MONOS#(MANUAL): 1.37 X10^3/UL (ref 0.3–2.7)
MONOS% (MANUAL): 5 % (ref 2–9)
NEUTS BAND NFR BLD: 11 % (ref 0–7)
PLATELET # BLD AUTO: 276 X10^3/UL (ref 130–400)
PMNS WITH VACUOLES: (no result)
PMV BLD AUTO: 8.9 FL (ref 7.4–10.4)
PROT SERPL-MCNC: 6.2 G/DL (ref 6.4–8.2)
RBC # BLD AUTO: 4.46 X10^6/UL (ref 3.82–5.3)
SEG#(MANUAL): 17.54 X10^3/UL (ref 1.8–6.8)
SEGS% (MANUAL): 64 % (ref 42–75)
SMUDGE CELLS # BLD: (no result) 10*3/UL

## 2018-10-09 RX ADMIN — GUAIFENESIN SCH MG: 600 TABLET, EXTENDED RELEASE ORAL at 21:15

## 2018-10-09 RX ADMIN — ENOXAPARIN SODIUM SCH MG: 40 INJECTION SUBCUTANEOUS at 16:58

## 2018-10-09 RX ADMIN — KETOROLAC TROMETHAMINE PRN MG: 30 INJECTION, SOLUTION INTRAMUSCULAR at 09:21

## 2018-10-09 RX ADMIN — LAMOTRIGINE SCH MG: 200 TABLET ORAL at 09:25

## 2018-10-09 RX ADMIN — GUAIFENESIN SCH MG: 600 TABLET, EXTENDED RELEASE ORAL at 09:20

## 2018-10-09 RX ADMIN — ALBUTEROL SULFATE SCH MG: 2.5 SOLUTION RESPIRATORY (INHALATION) at 11:00

## 2018-10-09 RX ADMIN — SODIUM CHLORIDE SCH MLS/HR: 0.9 INJECTION, SOLUTION INTRAVENOUS at 13:27

## 2018-10-09 RX ADMIN — ALBUTEROL SULFATE SCH MG: 2.5 SOLUTION RESPIRATORY (INHALATION) at 07:55

## 2018-10-09 RX ADMIN — KETOROLAC TROMETHAMINE PRN MG: 30 INJECTION, SOLUTION INTRAMUSCULAR at 21:22

## 2018-10-09 RX ADMIN — CEFTRIAXONE SCH MLS/HR: 2 INJECTION, SOLUTION INTRAVENOUS at 16:59

## 2018-10-09 RX ADMIN — FLUOXETINE HYDROCHLORIDE SCH MG: 20 CAPSULE ORAL at 09:20

## 2018-10-09 RX ADMIN — ARIPIPRAZOLE SCH MG: 5 TABLET ORAL at 09:20

## 2018-10-09 RX ADMIN — ALBUTEROL SULFATE SCH MG: 2.5 SOLUTION RESPIRATORY (INHALATION) at 20:30

## 2018-10-09 RX ADMIN — ACETAMINOPHEN PRN MG: 325 TABLET, FILM COATED ORAL at 16:58

## 2018-10-09 RX ADMIN — DOXYCYCLINE SCH MLS/HR: 100 INJECTION, POWDER, LYOPHILIZED, FOR SOLUTION INTRAVENOUS at 05:30

## 2018-10-09 RX ADMIN — ALBUTEROL SULFATE SCH MG: 2.5 SOLUTION RESPIRATORY (INHALATION) at 15:00

## 2018-10-09 RX ADMIN — NICOTINE SCH PATCH: 14 PATCH, EXTENDED RELEASE TRANSDERMAL at 21:16

## 2018-10-09 RX ADMIN — SODIUM CHLORIDE SCH MLS/HR: 0.9 INJECTION, SOLUTION INTRAVENOUS at 01:17

## 2018-10-09 RX ADMIN — DOXYCYCLINE SCH MLS/HR: 100 INJECTION, POWDER, LYOPHILIZED, FOR SOLUTION INTRAVENOUS at 18:19

## 2018-10-10 VITALS — SYSTOLIC BLOOD PRESSURE: 111 MMHG | DIASTOLIC BLOOD PRESSURE: 70 MMHG

## 2018-10-10 VITALS — DIASTOLIC BLOOD PRESSURE: 62 MMHG | SYSTOLIC BLOOD PRESSURE: 101 MMHG

## 2018-10-10 VITALS — SYSTOLIC BLOOD PRESSURE: 95 MMHG | DIASTOLIC BLOOD PRESSURE: 60 MMHG

## 2018-10-10 VITALS — SYSTOLIC BLOOD PRESSURE: 110 MMHG | DIASTOLIC BLOOD PRESSURE: 70 MMHG

## 2018-10-10 VITALS — SYSTOLIC BLOOD PRESSURE: 125 MMHG | DIASTOLIC BLOOD PRESSURE: 76 MMHG

## 2018-10-10 VITALS — DIASTOLIC BLOOD PRESSURE: 62 MMHG | SYSTOLIC BLOOD PRESSURE: 105 MMHG

## 2018-10-10 VITALS — SYSTOLIC BLOOD PRESSURE: 126 MMHG | DIASTOLIC BLOOD PRESSURE: 80 MMHG

## 2018-10-10 VITALS — DIASTOLIC BLOOD PRESSURE: 73 MMHG | SYSTOLIC BLOOD PRESSURE: 115 MMHG

## 2018-10-10 LAB
<PLATELET ESTIMATE>: ADEQUATE
<PLT MORPHOLOGY>: (no result)
ANION GAP SERPL CALC-SCNC: 7 MMOL/L (ref 5–15)
BAND#(MANUAL): 1.29 X10^3/UL
BILIRUB DIRECT SERPL-MCNC: NORMAL MG/DL
CALCIUM SERPL-MCNC: 8.5 MG/DL (ref 8.5–10.1)
CHLORIDE SERPL-SCNC: 109 MMOL/L (ref 98–107)
CLOSTRIDIUM DIFFICILE ANTIGEN: NEGATIVE
CLOSTRIDIUM DIFFICILE TOXIN: NEGATIVE
CREAT SERPL-MCNC: 0.59 MG/DL (ref 0.55–1.02)
EOS#(MANUAL): 0.92 X10^3/UL (ref 0–0.4)
EOS% (MANUAL): 5 % (ref 1–7)
ERYTHROCYTE [DISTWIDTH] IN BLOOD BY AUTOMATED COUNT: 14.4 % (ref 9.6–15.2)
LYMPH#(MANUAL): 1.29 X10^3/UL (ref 1–3.4)
LYMPHS% (MANUAL): 7 % (ref 22–44)
MCH RBC QN AUTO: 32 PG (ref 27–34.8)
MCHC RBC AUTO-ENTMCNC: 33.3 G/DL (ref 32.4–35.8)
MCV RBC AUTO: 96.3 FL (ref 80–100)
MD: YES
MONOS#(MANUAL): 0.37 X10^3/UL (ref 0.3–2.7)
MONOS% (MANUAL): 2 % (ref 2–9)
NEUTS BAND NFR BLD: 7 % (ref 0–7)
PLATELET # BLD AUTO: 298 X10^3/UL (ref 130–400)
PMV BLD AUTO: 9 FL (ref 7.4–10.4)
RBC # BLD AUTO: 3.84 X10^6/UL (ref 3.82–5.3)
SEG#(MANUAL): 14.54 X10^3/UL (ref 1.8–6.8)
SEGS% (MANUAL): 79 % (ref 42–75)

## 2018-10-10 RX ADMIN — ALBUTEROL SULFATE SCH MG: 2.5 SOLUTION RESPIRATORY (INHALATION) at 10:00

## 2018-10-10 RX ADMIN — KETOROLAC TROMETHAMINE PRN MG: 30 INJECTION, SOLUTION INTRAMUSCULAR at 22:52

## 2018-10-10 RX ADMIN — ARIPIPRAZOLE SCH MG: 5 TABLET ORAL at 08:28

## 2018-10-10 RX ADMIN — DOXYCYCLINE SCH MLS/HR: 100 INJECTION, POWDER, LYOPHILIZED, FOR SOLUTION INTRAVENOUS at 06:18

## 2018-10-10 RX ADMIN — SODIUM CHLORIDE SCH MLS/HR: 0.9 INJECTION, SOLUTION INTRAVENOUS at 16:34

## 2018-10-10 RX ADMIN — ACETAMINOPHEN PRN MG: 325 TABLET, FILM COATED ORAL at 20:48

## 2018-10-10 RX ADMIN — CEFTRIAXONE SCH MLS/HR: 2 INJECTION, SOLUTION INTRAVENOUS at 16:34

## 2018-10-10 RX ADMIN — GUAIFENESIN SCH MG: 600 TABLET, EXTENDED RELEASE ORAL at 08:28

## 2018-10-10 RX ADMIN — ALBUTEROL SULFATE SCH MG: 2.5 SOLUTION RESPIRATORY (INHALATION) at 14:25

## 2018-10-10 RX ADMIN — DOXYCYCLINE SCH MLS/HR: 100 INJECTION, POWDER, LYOPHILIZED, FOR SOLUTION INTRAVENOUS at 18:15

## 2018-10-10 RX ADMIN — FLUOXETINE HYDROCHLORIDE SCH MG: 20 CAPSULE ORAL at 08:27

## 2018-10-10 RX ADMIN — NICOTINE SCH PATCH: 14 PATCH, EXTENDED RELEASE TRANSDERMAL at 20:50

## 2018-10-10 RX ADMIN — ENOXAPARIN SODIUM SCH MG: 40 INJECTION SUBCUTANEOUS at 16:34

## 2018-10-10 RX ADMIN — KETOROLAC TROMETHAMINE PRN MG: 30 INJECTION, SOLUTION INTRAMUSCULAR at 12:56

## 2018-10-10 RX ADMIN — ALBUTEROL SULFATE SCH MG: 2.5 SOLUTION RESPIRATORY (INHALATION) at 20:00

## 2018-10-10 RX ADMIN — KETOROLAC TROMETHAMINE PRN MG: 30 INJECTION, SOLUTION INTRAMUSCULAR at 04:44

## 2018-10-10 RX ADMIN — SODIUM CHLORIDE SCH MLS/HR: 0.9 INJECTION, SOLUTION INTRAVENOUS at 08:28

## 2018-10-10 RX ADMIN — LAMOTRIGINE SCH MG: 200 TABLET ORAL at 08:29

## 2018-10-10 RX ADMIN — GUAIFENESIN SCH MG: 600 TABLET, EXTENDED RELEASE ORAL at 20:48

## 2018-10-10 RX ADMIN — ALBUTEROL SULFATE SCH MG: 2.5 SOLUTION RESPIRATORY (INHALATION) at 06:44

## 2018-10-11 VITALS — DIASTOLIC BLOOD PRESSURE: 77 MMHG | SYSTOLIC BLOOD PRESSURE: 129 MMHG

## 2018-10-11 VITALS — SYSTOLIC BLOOD PRESSURE: 119 MMHG | DIASTOLIC BLOOD PRESSURE: 72 MMHG

## 2018-10-11 VITALS — DIASTOLIC BLOOD PRESSURE: 83 MMHG | SYSTOLIC BLOOD PRESSURE: 132 MMHG

## 2018-10-11 VITALS — DIASTOLIC BLOOD PRESSURE: 63 MMHG | SYSTOLIC BLOOD PRESSURE: 121 MMHG

## 2018-10-11 LAB
<PLATELET ESTIMATE>: ADEQUATE
<PLT MORPHOLOGY>: (no result)
ANION GAP SERPL CALC-SCNC: 9 MMOL/L (ref 5–15)
BAND#(MANUAL): 0.9 X10^3/UL
CALCIUM SERPL-MCNC: 8.3 MG/DL (ref 8.5–10.1)
CHLORIDE SERPL-SCNC: 111 MMOL/L (ref 98–107)
CREAT SERPL-MCNC: 0.51 MG/DL (ref 0.55–1.02)
EOS#(MANUAL): 0.51 X10^3/UL (ref 0–0.4)
EOS% (MANUAL): 4 % (ref 1–7)
ERYTHROCYTE [DISTWIDTH] IN BLOOD BY AUTOMATED COUNT: 14.5 % (ref 9.6–15.2)
LYMPH#(MANUAL): 2.18 X10^3/UL (ref 1–3.4)
LYMPHS% (MANUAL): 17 % (ref 22–44)
MCH RBC QN AUTO: 31.7 PG (ref 27–34.8)
MCHC RBC AUTO-ENTMCNC: 33 G/DL (ref 32.4–35.8)
MCV RBC AUTO: 95.9 FL (ref 80–100)
MD: YES
MONOS#(MANUAL): 1.28 X10^3/UL (ref 0.3–2.7)
MONOS% (MANUAL): 10 % (ref 2–9)
NEUTS BAND NFR BLD: 7 % (ref 0–7)
PLATELET # BLD AUTO: 348 X10^3/UL (ref 130–400)
PMV BLD AUTO: 8.6 FL (ref 7.4–10.4)
RBC # BLD AUTO: 3.81 X10^6/UL (ref 3.82–5.3)
SEG#(MANUAL): 7.94 X10^3/UL (ref 1.8–6.8)
SEGS% (MANUAL): 62 % (ref 42–75)
TOXIC GRAN: (no result)

## 2018-10-11 RX ADMIN — POTASSIUM CHLORIDE SCH MEQ: 20 TABLET, EXTENDED RELEASE ORAL at 16:19

## 2018-10-11 RX ADMIN — NICOTINE SCH PATCH: 14 PATCH, EXTENDED RELEASE TRANSDERMAL at 20:59

## 2018-10-11 RX ADMIN — CEFTRIAXONE SCH MLS/HR: 2 INJECTION, SOLUTION INTRAVENOUS at 16:19

## 2018-10-11 RX ADMIN — ALBUTEROL SULFATE SCH MG: 2.5 SOLUTION RESPIRATORY (INHALATION) at 10:28

## 2018-10-11 RX ADMIN — GUAIFENESIN SCH MG: 600 TABLET, EXTENDED RELEASE ORAL at 20:59

## 2018-10-11 RX ADMIN — SODIUM CHLORIDE SCH MLS/HR: 0.9 INJECTION, SOLUTION INTRAVENOUS at 17:41

## 2018-10-11 RX ADMIN — HYDROCODONE BITARTRATE AND ACETAMINOPHEN PRN TAB: 5; 325 TABLET ORAL at 08:40

## 2018-10-11 RX ADMIN — ALBUTEROL SULFATE SCH MG: 2.5 SOLUTION RESPIRATORY (INHALATION) at 06:37

## 2018-10-11 RX ADMIN — ARIPIPRAZOLE SCH MG: 5 TABLET ORAL at 08:34

## 2018-10-11 RX ADMIN — ENOXAPARIN SODIUM SCH MG: 40 INJECTION SUBCUTANEOUS at 16:24

## 2018-10-11 RX ADMIN — LAMOTRIGINE SCH MG: 200 TABLET ORAL at 08:34

## 2018-10-11 RX ADMIN — ACETAMINOPHEN PRN MG: 325 TABLET, FILM COATED ORAL at 20:59

## 2018-10-11 RX ADMIN — ALBUTEROL SULFATE SCH MG: 2.5 SOLUTION RESPIRATORY (INHALATION) at 20:31

## 2018-10-11 RX ADMIN — ALBUTEROL SULFATE SCH MG: 2.5 SOLUTION RESPIRATORY (INHALATION) at 13:45

## 2018-10-11 RX ADMIN — SODIUM CHLORIDE SCH MLS/HR: 0.9 INJECTION, SOLUTION INTRAVENOUS at 02:45

## 2018-10-11 RX ADMIN — GUAIFENESIN SCH MG: 600 TABLET, EXTENDED RELEASE ORAL at 08:34

## 2018-10-11 RX ADMIN — HYDROCODONE BITARTRATE AND ACETAMINOPHEN PRN TAB: 5; 325 TABLET ORAL at 16:19

## 2018-10-11 RX ADMIN — DOXYCYCLINE SCH MLS/HR: 100 INJECTION, POWDER, LYOPHILIZED, FOR SOLUTION INTRAVENOUS at 05:39

## 2018-10-11 RX ADMIN — FLUOXETINE HYDROCHLORIDE SCH MG: 20 CAPSULE ORAL at 08:34

## 2018-10-11 RX ADMIN — POTASSIUM CHLORIDE SCH MEQ: 20 TABLET, EXTENDED RELEASE ORAL at 08:34

## 2018-10-11 RX ADMIN — DOXYCYCLINE SCH MLS/HR: 100 INJECTION, POWDER, LYOPHILIZED, FOR SOLUTION INTRAVENOUS at 17:41

## 2018-10-12 VITALS — DIASTOLIC BLOOD PRESSURE: 68 MMHG | SYSTOLIC BLOOD PRESSURE: 114 MMHG

## 2018-10-12 VITALS — SYSTOLIC BLOOD PRESSURE: 129 MMHG | DIASTOLIC BLOOD PRESSURE: 83 MMHG

## 2018-10-12 VITALS — DIASTOLIC BLOOD PRESSURE: 90 MMHG | SYSTOLIC BLOOD PRESSURE: 144 MMHG

## 2018-10-12 VITALS — DIASTOLIC BLOOD PRESSURE: 67 MMHG | SYSTOLIC BLOOD PRESSURE: 111 MMHG

## 2018-10-12 LAB
<PLATELET ESTIMATE>: ADEQUATE
<PLT MORPHOLOGY>: (no result)
ANION GAP SERPL CALC-SCNC: 7 MMOL/L (ref 5–15)
BILIRUB DIRECT SERPL-MCNC: NORMAL MG/DL
CALCIUM SERPL-MCNC: 7.8 MG/DL (ref 8.5–10.1)
CHLORIDE SERPL-SCNC: 107 MMOL/L (ref 98–107)
CREAT SERPL-MCNC: 0.47 MG/DL (ref 0.55–1.02)
ERYTHROCYTE [DISTWIDTH] IN BLOOD BY AUTOMATED COUNT: 14.6 % (ref 9.6–15.2)
LYMPH#(MANUAL): 1.63 X10^3/UL (ref 1–3.4)
LYMPHS% (MANUAL): 13 % (ref 22–44)
MCH RBC QN AUTO: 32.2 PG (ref 27–34.8)
MCHC RBC AUTO-ENTMCNC: 33.4 G/DL (ref 32.4–35.8)
MCV RBC AUTO: 96.3 FL (ref 80–100)
MD: YES
MONOS#(MANUAL): 3.38 X10^3/UL (ref 0.3–2.7)
MONOS% (MANUAL): 27 % (ref 2–9)
PLATELET # BLD AUTO: 390 X10^3/UL (ref 130–400)
PMV BLD AUTO: 8.7 FL (ref 7.4–10.4)
RBC # BLD AUTO: 3.72 X10^6/UL (ref 3.82–5.3)
SEG#(MANUAL): 7.5 X10^3/UL (ref 1.8–6.8)
SEGS% (MANUAL): 60 % (ref 42–75)
TOXIC GRAN: (no result)

## 2018-10-12 RX ADMIN — ALBUTEROL SULFATE SCH MG: 2.5 SOLUTION RESPIRATORY (INHALATION) at 07:10

## 2018-10-12 RX ADMIN — FLUOXETINE HYDROCHLORIDE SCH MG: 20 CAPSULE ORAL at 10:33

## 2018-10-12 RX ADMIN — ARIPIPRAZOLE SCH MG: 5 TABLET ORAL at 10:26

## 2018-10-12 RX ADMIN — CEFTRIAXONE SCH MLS/HR: 2 INJECTION, SOLUTION INTRAVENOUS at 17:10

## 2018-10-12 RX ADMIN — ACETAMINOPHEN PRN MG: 325 TABLET, FILM COATED ORAL at 17:09

## 2018-10-12 RX ADMIN — GUAIFENESIN SCH MG: 600 TABLET, EXTENDED RELEASE ORAL at 10:26

## 2018-10-12 RX ADMIN — SODIUM CHLORIDE SCH MLS/HR: 0.9 INJECTION, SOLUTION INTRAVENOUS at 10:33

## 2018-10-12 RX ADMIN — DOXYCYCLINE SCH MLS/HR: 100 INJECTION, POWDER, LYOPHILIZED, FOR SOLUTION INTRAVENOUS at 18:36

## 2018-10-12 RX ADMIN — ALBUTEROL SULFATE SCH MG: 2.5 SOLUTION RESPIRATORY (INHALATION) at 12:10

## 2018-10-12 RX ADMIN — DOXYCYCLINE SCH MLS/HR: 100 INJECTION, POWDER, LYOPHILIZED, FOR SOLUTION INTRAVENOUS at 05:55

## 2018-10-12 RX ADMIN — HYDROCODONE BITARTRATE AND ACETAMINOPHEN PRN TAB: 5; 325 TABLET ORAL at 05:04

## 2018-10-12 RX ADMIN — GUAIFENESIN SCH MG: 600 TABLET, EXTENDED RELEASE ORAL at 20:14

## 2018-10-12 RX ADMIN — ENOXAPARIN SODIUM SCH MG: 40 INJECTION SUBCUTANEOUS at 17:09

## 2018-10-12 RX ADMIN — LAMOTRIGINE SCH MG: 200 TABLET ORAL at 10:33

## 2018-10-12 RX ADMIN — POTASSIUM CHLORIDE SCH MEQ: 20 TABLET, EXTENDED RELEASE ORAL at 17:09

## 2018-10-12 RX ADMIN — HYDROCODONE BITARTRATE AND ACETAMINOPHEN PRN TAB: 5; 325 TABLET ORAL at 11:21

## 2018-10-12 RX ADMIN — NICOTINE SCH PATCH: 14 PATCH, EXTENDED RELEASE TRANSDERMAL at 20:20

## 2018-10-12 RX ADMIN — POTASSIUM CHLORIDE SCH MEQ: 20 TABLET, EXTENDED RELEASE ORAL at 10:26

## 2018-10-12 RX ADMIN — ALBUTEROL SULFATE SCH MG: 2.5 SOLUTION RESPIRATORY (INHALATION) at 20:00

## 2018-10-12 RX ADMIN — SODIUM CHLORIDE SCH MLS/HR: 0.9 INJECTION, SOLUTION INTRAVENOUS at 20:14

## 2018-10-12 RX ADMIN — SODIUM CHLORIDE SCH MLS/HR: 0.9 INJECTION, SOLUTION INTRAVENOUS at 02:04

## 2018-10-12 RX ADMIN — ALBUTEROL SULFATE SCH MG: 2.5 SOLUTION RESPIRATORY (INHALATION) at 14:50

## 2018-10-13 VITALS — SYSTOLIC BLOOD PRESSURE: 142 MMHG | DIASTOLIC BLOOD PRESSURE: 83 MMHG

## 2018-10-13 VITALS — SYSTOLIC BLOOD PRESSURE: 136 MMHG | DIASTOLIC BLOOD PRESSURE: 77 MMHG

## 2018-10-13 VITALS — SYSTOLIC BLOOD PRESSURE: 135 MMHG | DIASTOLIC BLOOD PRESSURE: 82 MMHG

## 2018-10-13 VITALS — DIASTOLIC BLOOD PRESSURE: 82 MMHG | SYSTOLIC BLOOD PRESSURE: 137 MMHG

## 2018-10-13 LAB
<PLATELET ESTIMATE>: (no result)
<PLT MORPHOLOGY>: (no result)
ANION GAP SERPL CALC-SCNC: 7 MMOL/L (ref 5–15)
ANISOCYTOSIS BLD QL SMEAR: (no result)
BAND#(MANUAL): 0.2 X10^3/UL
CALCIUM SERPL-MCNC: 8.4 MG/DL (ref 8.5–10.1)
CHLORIDE SERPL-SCNC: 108 MMOL/L (ref 98–107)
CREAT SERPL-MCNC: 0.45 MG/DL (ref 0.55–1.02)
EOS#(MANUAL): 0.1 X10^3/UL (ref 0–0.4)
EOS% (MANUAL): 1 % (ref 1–7)
ERYTHROCYTE [DISTWIDTH] IN BLOOD BY AUTOMATED COUNT: 14.5 % (ref 9.6–15.2)
LYMPH#(MANUAL): 2.06 X10^3/UL (ref 1–3.4)
LYMPHS% (MANUAL): 21 % (ref 22–44)
MCH RBC QN AUTO: 31.7 PG (ref 27–34.8)
MCHC RBC AUTO-ENTMCNC: 32.9 G/DL (ref 32.4–35.8)
MCV RBC AUTO: 96.3 FL (ref 80–100)
MD: YES
METAMYELOCYTES# (MANUAL): 0.1 X10^3/UL (ref 0–0)
METAMYELOCYTES% (MANUAL): 1 % (ref 0–1)
MONOS#(MANUAL): 2.06 X10^3/UL (ref 0.3–2.7)
MONOS% (MANUAL): 21 % (ref 2–9)
MYELOCYTES# (MANUAL): 0.1 X10^3/UL (ref 0–0)
MYELOCYTES% (MANUAL): 1 % (ref 0–0)
NEUTS BAND NFR BLD: 2 % (ref 0–7)
PLATELET # BLD AUTO: 490 X10^3/UL (ref 130–400)
PMV BLD AUTO: 8.6 FL (ref 7.4–10.4)
RBC # BLD AUTO: 3.63 X10^6/UL (ref 3.82–5.3)
SEG#(MANUAL): 5.19 X10^3/UL (ref 1.8–6.8)
SEGS% (MANUAL): 53 % (ref 42–75)

## 2018-10-13 RX ADMIN — ALBUTEROL SULFATE SCH MG: 2.5 SOLUTION RESPIRATORY (INHALATION) at 06:35

## 2018-10-13 RX ADMIN — ACETAMINOPHEN PRN MG: 325 TABLET, FILM COATED ORAL at 16:21

## 2018-10-13 RX ADMIN — HYDROCODONE BITARTRATE AND ACETAMINOPHEN PRN TAB: 5; 325 TABLET ORAL at 21:23

## 2018-10-13 RX ADMIN — GUAIFENESIN SCH MG: 600 TABLET, EXTENDED RELEASE ORAL at 21:23

## 2018-10-13 RX ADMIN — NICOTINE SCH PATCH: 14 PATCH, EXTENDED RELEASE TRANSDERMAL at 21:23

## 2018-10-13 RX ADMIN — DOXYCYCLINE SCH MLS/HR: 100 INJECTION, POWDER, LYOPHILIZED, FOR SOLUTION INTRAVENOUS at 05:14

## 2018-10-13 RX ADMIN — GUAIFENESIN SCH MG: 600 TABLET, EXTENDED RELEASE ORAL at 09:09

## 2018-10-13 RX ADMIN — ALBUTEROL SULFATE SCH MG: 2.5 SOLUTION RESPIRATORY (INHALATION) at 10:34

## 2018-10-13 RX ADMIN — FLUOXETINE HYDROCHLORIDE SCH MG: 20 CAPSULE ORAL at 09:10

## 2018-10-13 RX ADMIN — SODIUM CHLORIDE SCH MLS/HR: 0.9 INJECTION, SOLUTION INTRAVENOUS at 21:23

## 2018-10-13 RX ADMIN — LEVOFLOXACIN SCH MG: 750 TABLET, FILM COATED ORAL at 16:24

## 2018-10-13 RX ADMIN — ENOXAPARIN SODIUM SCH MG: 40 INJECTION SUBCUTANEOUS at 16:24

## 2018-10-13 RX ADMIN — ACETAMINOPHEN PRN MG: 325 TABLET, FILM COATED ORAL at 02:17

## 2018-10-13 RX ADMIN — ARIPIPRAZOLE SCH MG: 5 TABLET ORAL at 09:09

## 2018-10-13 RX ADMIN — SODIUM CHLORIDE SCH MLS/HR: 0.9 INJECTION, SOLUTION INTRAVENOUS at 04:18

## 2018-10-13 RX ADMIN — POTASSIUM CHLORIDE SCH MEQ: 20 TABLET, EXTENDED RELEASE ORAL at 17:24

## 2018-10-13 RX ADMIN — LAMOTRIGINE SCH MG: 200 TABLET ORAL at 09:09

## 2018-10-13 RX ADMIN — HYDROCODONE BITARTRATE AND ACETAMINOPHEN PRN TAB: 5; 325 TABLET ORAL at 12:25

## 2018-10-13 RX ADMIN — POTASSIUM CHLORIDE SCH MEQ: 20 TABLET, EXTENDED RELEASE ORAL at 09:09

## 2018-10-14 VITALS — DIASTOLIC BLOOD PRESSURE: 83 MMHG | SYSTOLIC BLOOD PRESSURE: 133 MMHG

## 2018-10-14 VITALS — DIASTOLIC BLOOD PRESSURE: 83 MMHG | SYSTOLIC BLOOD PRESSURE: 148 MMHG

## 2018-10-14 VITALS — SYSTOLIC BLOOD PRESSURE: 132 MMHG | DIASTOLIC BLOOD PRESSURE: 78 MMHG

## 2018-10-14 LAB
<PLATELET ESTIMATE>: (no result)
<PLT MORPHOLOGY>: (no result)
ALBUMIN SERPL-MCNC: 2.3 G/DL (ref 3.4–5)
ALP SERPL-CCNC: 72 U/L (ref 45–117)
ALT SERPL-CCNC: 23 U/L (ref 12–78)
ANION GAP SERPL CALC-SCNC: 7 MMOL/L (ref 5–15)
ANISOCYTOSIS BLD QL SMEAR: (no result)
BAND#(MANUAL): 0.22 X10^3/UL
BASOPHILS NFR BLD MANUAL: 1 % (ref 0–1)
BASOS#(MANUAL): 0.11 X10^3/UL (ref 0–0.1)
BILIRUB SERPL-MCNC: 0.3 MG/DL (ref 0.2–1)
CALCIUM SERPL-MCNC: 8.4 MG/DL (ref 8.5–10.1)
CHLORIDE SERPL-SCNC: 109 MMOL/L (ref 98–107)
CREAT SERPL-MCNC: 0.51 MG/DL (ref 0.55–1.02)
EOS#(MANUAL): 0.44 X10^3/UL (ref 0–0.4)
EOS% (MANUAL): 4 % (ref 1–7)
ERYTHROCYTE [DISTWIDTH] IN BLOOD BY AUTOMATED COUNT: 14.5 % (ref 9.6–15.2)
LYMPH#(MANUAL): 2.73 X10^3/UL (ref 1–3.4)
LYMPHS% (MANUAL): 25 % (ref 22–44)
MCH RBC QN AUTO: 31.1 PG (ref 27–34.8)
MCHC RBC AUTO-ENTMCNC: 32.6 G/DL (ref 32.4–35.8)
MCV RBC AUTO: 95.4 FL (ref 80–100)
MD: YES
MONOS#(MANUAL): 1.2 X10^3/UL (ref 0.3–2.7)
MONOS% (MANUAL): 11 % (ref 2–9)
NEUTS BAND NFR BLD: 2 % (ref 0–7)
PLATELET # BLD AUTO: 477 X10^3/UL (ref 130–400)
PMV BLD AUTO: 9.4 FL (ref 7.4–10.4)
PROT SERPL-MCNC: 6.6 G/DL (ref 6.4–8.2)
RBC # BLD AUTO: 4.03 X10^6/UL (ref 3.82–5.3)
SEG#(MANUAL): 6.21 X10^3/UL (ref 1.8–6.8)
SEGS% (MANUAL): 57 % (ref 42–75)

## 2018-10-14 RX ADMIN — LEVOFLOXACIN SCH MG: 750 TABLET, FILM COATED ORAL at 08:58

## 2018-10-14 RX ADMIN — POTASSIUM CHLORIDE SCH MEQ: 20 TABLET, EXTENDED RELEASE ORAL at 08:58

## 2018-10-14 RX ADMIN — FLUOXETINE HYDROCHLORIDE SCH MG: 20 CAPSULE ORAL at 08:58

## 2018-10-14 RX ADMIN — SODIUM CHLORIDE SCH MLS/HR: 0.9 INJECTION, SOLUTION INTRAVENOUS at 05:30

## 2018-10-14 RX ADMIN — LAMOTRIGINE SCH MG: 200 TABLET ORAL at 08:57

## 2018-10-14 RX ADMIN — ARIPIPRAZOLE SCH MG: 5 TABLET ORAL at 08:58

## 2018-10-14 RX ADMIN — GUAIFENESIN SCH MG: 600 TABLET, EXTENDED RELEASE ORAL at 08:58

## 2018-12-10 ENCOUNTER — HOSPITAL ENCOUNTER (OUTPATIENT)
Dept: HOSPITAL 8 - ROC | Age: 58
Discharge: HOME | End: 2018-12-10
Attending: RADIOLOGY
Payer: MEDICAID

## 2018-12-10 DIAGNOSIS — Z02.9: Primary | ICD-10-CM

## 2018-12-22 ENCOUNTER — HOSPITAL ENCOUNTER (EMERGENCY)
Dept: HOSPITAL 8 - ED | Age: 58
Discharge: HOME | End: 2018-12-22
Payer: MEDICAID

## 2018-12-22 VITALS — BODY MASS INDEX: 20.2 KG/M2 | WEIGHT: 121.25 LBS | HEIGHT: 65 IN

## 2018-12-22 VITALS — SYSTOLIC BLOOD PRESSURE: 115 MMHG | DIASTOLIC BLOOD PRESSURE: 73 MMHG

## 2018-12-22 DIAGNOSIS — J02.8: Primary | ICD-10-CM

## 2018-12-22 DIAGNOSIS — R11.2: ICD-10-CM

## 2018-12-22 LAB
ALBUMIN SERPL-MCNC: 3.8 G/DL (ref 3.4–5)
ALP SERPL-CCNC: 93 U/L (ref 45–117)
ALT SERPL-CCNC: 25 U/L (ref 12–78)
ANION GAP SERPL CALC-SCNC: 5 MMOL/L (ref 5–15)
BASOPHILS # BLD AUTO: 0.02 X10^3/UL (ref 0–0.1)
BASOPHILS NFR BLD AUTO: 0 % (ref 0–1)
BILIRUB SERPL-MCNC: 0.4 MG/DL (ref 0.2–1)
CALCIUM SERPL-MCNC: 8.9 MG/DL (ref 8.5–10.1)
CHLORIDE SERPL-SCNC: 106 MMOL/L (ref 98–107)
CREAT SERPL-MCNC: 0.93 MG/DL (ref 0.55–1.02)
EOSINOPHIL # BLD AUTO: 0.11 X10^3/UL (ref 0–0.4)
EOSINOPHIL NFR BLD AUTO: 1 % (ref 1–7)
ERYTHROCYTE [DISTWIDTH] IN BLOOD BY AUTOMATED COUNT: 15.2 % (ref 9.6–15.2)
LYMPHOCYTES # BLD AUTO: 2.24 X10^3/UL (ref 1–3.4)
LYMPHOCYTES NFR BLD AUTO: 27 % (ref 22–44)
MCH RBC QN AUTO: 31 PG (ref 27–34.8)
MCHC RBC AUTO-ENTMCNC: 33 G/DL (ref 32.4–35.8)
MCV RBC AUTO: 94.1 FL (ref 80–100)
MD: NO
MONOCYTES # BLD AUTO: 0.57 X10^3/UL (ref 0.2–0.8)
MONOCYTES NFR BLD AUTO: 7 % (ref 2–9)
NEUTROPHILS # BLD AUTO: 5.44 X10^3/UL (ref 1.8–6.8)
NEUTROPHILS NFR BLD AUTO: 65 % (ref 42–75)
PLATELET # BLD AUTO: 435 X10^3/UL (ref 130–400)
PMV BLD AUTO: 8.1 FL (ref 7.4–10.4)
PROT SERPL-MCNC: 7.9 G/DL (ref 6.4–8.2)
RBC # BLD AUTO: 5.06 X10^6/UL (ref 3.82–5.3)

## 2018-12-22 PROCEDURE — 99283 EMERGENCY DEPT VISIT LOW MDM: CPT

## 2018-12-22 PROCEDURE — 87880 STREP A ASSAY W/OPTIC: CPT

## 2018-12-22 PROCEDURE — 80053 COMPREHEN METABOLIC PANEL: CPT

## 2018-12-22 PROCEDURE — 87081 CULTURE SCREEN ONLY: CPT

## 2018-12-22 PROCEDURE — 85025 COMPLETE CBC W/AUTO DIFF WBC: CPT

## 2018-12-22 PROCEDURE — 36415 COLL VENOUS BLD VENIPUNCTURE: CPT

## 2019-01-28 ENCOUNTER — HOSPITAL ENCOUNTER (OUTPATIENT)
Dept: HOSPITAL 8 - ROC | Age: 59
Discharge: HOME | End: 2019-01-28
Attending: RADIOLOGY
Payer: MEDICAID

## 2019-01-28 ENCOUNTER — HOSPITAL ENCOUNTER (OUTPATIENT)
Dept: HOSPITAL 8 - CFH | Age: 59
Discharge: HOME | End: 2019-01-28
Attending: RADIOLOGY
Payer: MEDICAID

## 2019-01-28 DIAGNOSIS — Z08: Primary | ICD-10-CM

## 2019-01-28 DIAGNOSIS — C25.1: ICD-10-CM

## 2019-01-28 DIAGNOSIS — J98.11: ICD-10-CM

## 2019-01-28 DIAGNOSIS — J18.1: Primary | ICD-10-CM

## 2019-01-28 PROCEDURE — G0463 HOSPITAL OUTPT CLINIC VISIT: HCPCS

## 2019-01-28 PROCEDURE — 99213 OFFICE O/P EST LOW 20 MIN: CPT

## 2019-01-28 PROCEDURE — 71260 CT THORAX DX C+: CPT

## 2019-04-19 ENCOUNTER — HOSPITAL ENCOUNTER (OUTPATIENT)
Dept: HOSPITAL 8 - CFH | Age: 59
Discharge: HOME | End: 2019-04-19
Attending: RADIOLOGY
Payer: MEDICAID

## 2019-04-19 DIAGNOSIS — C25.1: Primary | ICD-10-CM

## 2019-04-19 PROCEDURE — 82565 ASSAY OF CREATININE: CPT

## 2019-04-19 PROCEDURE — 71260 CT THORAX DX C+: CPT

## 2019-04-19 PROCEDURE — 74177 CT ABD & PELVIS W/CONTRAST: CPT

## 2019-04-22 ENCOUNTER — HOSPITAL ENCOUNTER (OUTPATIENT)
Dept: HOSPITAL 8 - ROC | Age: 59
Discharge: HOME | End: 2019-04-22
Attending: RADIOLOGY
Payer: MEDICAID

## 2019-04-22 DIAGNOSIS — C25.1: Primary | ICD-10-CM

## 2019-04-22 PROCEDURE — G0463 HOSPITAL OUTPT CLINIC VISIT: HCPCS

## 2019-04-22 PROCEDURE — 99212 OFFICE O/P EST SF 10 MIN: CPT

## 2020-01-14 ENCOUNTER — HOSPITAL ENCOUNTER (OUTPATIENT)
Dept: HOSPITAL 8 - CFH | Age: 60
Discharge: HOME | End: 2020-01-14
Attending: RADIOLOGY
Payer: MEDICAID

## 2020-01-14 DIAGNOSIS — C25.1: Primary | ICD-10-CM

## 2020-01-14 PROCEDURE — 71260 CT THORAX DX C+: CPT

## 2020-01-14 PROCEDURE — 74177 CT ABD & PELVIS W/CONTRAST: CPT

## 2020-01-27 ENCOUNTER — HOSPITAL ENCOUNTER (OUTPATIENT)
Dept: HOSPITAL 8 - ROC | Age: 60
Discharge: HOME | End: 2020-01-27
Attending: RADIOLOGY
Payer: MEDICAID

## 2020-01-27 DIAGNOSIS — C25.1: Primary | ICD-10-CM

## 2020-01-27 PROCEDURE — 99213 OFFICE O/P EST LOW 20 MIN: CPT

## 2020-01-27 PROCEDURE — G0463 HOSPITAL OUTPT CLINIC VISIT: HCPCS

## 2020-09-18 ENCOUNTER — HOSPITAL ENCOUNTER (OUTPATIENT)
Dept: HOSPITAL 8 - CFH | Age: 60
Discharge: HOME | End: 2020-09-18
Attending: RADIOLOGY
Payer: MEDICARE

## 2020-09-18 DIAGNOSIS — K57.30: ICD-10-CM

## 2020-09-18 DIAGNOSIS — K44.9: ICD-10-CM

## 2020-09-18 DIAGNOSIS — M51.36: ICD-10-CM

## 2020-09-18 DIAGNOSIS — C25.1: Primary | ICD-10-CM

## 2020-09-18 DIAGNOSIS — M47.816: ICD-10-CM

## 2020-09-18 PROCEDURE — 71260 CT THORAX DX C+: CPT

## 2020-09-18 PROCEDURE — 74177 CT ABD & PELVIS W/CONTRAST: CPT

## 2020-09-23 ENCOUNTER — HOSPITAL ENCOUNTER (OUTPATIENT)
Dept: HOSPITAL 8 - ROC | Age: 60
Discharge: HOME | End: 2020-09-23
Attending: RADIOLOGY
Payer: MEDICARE

## 2020-09-23 DIAGNOSIS — C25.1: Primary | ICD-10-CM

## 2020-09-23 PROCEDURE — 99213 OFFICE O/P EST LOW 20 MIN: CPT

## 2020-09-23 PROCEDURE — G0463 HOSPITAL OUTPT CLINIC VISIT: HCPCS

## 2020-10-14 ENCOUNTER — HOSPITAL ENCOUNTER (OUTPATIENT)
Dept: HOSPITAL 8 - OUT | Age: 60
Discharge: HOME | End: 2020-10-14
Attending: INTERNAL MEDICINE
Payer: MEDICARE

## 2020-10-14 VITALS — DIASTOLIC BLOOD PRESSURE: 82 MMHG | SYSTOLIC BLOOD PRESSURE: 114 MMHG

## 2020-10-14 VITALS — HEIGHT: 65 IN | BODY MASS INDEX: 21.82 KG/M2 | WEIGHT: 130.95 LBS

## 2020-10-14 DIAGNOSIS — R19.09: Primary | ICD-10-CM

## 2020-10-14 DIAGNOSIS — Z90.81: ICD-10-CM

## 2020-10-14 DIAGNOSIS — Z79.899: ICD-10-CM

## 2020-10-14 DIAGNOSIS — C25.1: ICD-10-CM

## 2020-10-14 PROCEDURE — 88305 TISSUE EXAM BY PATHOLOGIST: CPT

## 2020-10-14 PROCEDURE — 99156 MOD SED OTH PHYS/QHP 5/>YRS: CPT

## 2020-10-14 PROCEDURE — 99157 MOD SED OTHER PHYS/QHP EA: CPT

## 2020-10-14 PROCEDURE — 88112 CYTOPATH CELL ENHANCE TECH: CPT

## 2020-10-14 PROCEDURE — 77012 CT SCAN FOR NEEDLE BIOPSY: CPT

## 2020-10-14 PROCEDURE — 88333 PATH CONSLTJ SURG CYTO XM 1: CPT

## 2020-10-14 PROCEDURE — 49180 BIOPSY ABDOMINAL MASS: CPT

## 2020-11-16 NOTE — PROGRESS NOTES
Subjective:   11/17/2020  9:02 AM  Primary care physician:Pcp Pt States None  Referring Provider: Flores Eubanks MD  Medical Oncologist: Flores Eubanks MD    Chief Complaint:   Chief Complaint   Patient presents with   • Follow-Up     [facility] BX/CT/PET [facility] PANCREATIC CA      Diagnosis:   1. Malignant neoplasm of pancreas, unspecified location of malignancy (HCC)     2. Abdominal pain, unspecified abdominal location     3. Abnormal CT of the abdomen         History of presenting illness:  Frieda Huizar  is a pleasant 60 y.o. year old female who presented with follow-up status post treatment for pancreatic adenocarcinoma back 3 years ago.  She is doing well but only completed 3 cycles of chemotherapy because she was ill.  The patient underwent chemoradiotherapy with Dr. Thomas at [facility].  He has been doing her surveillance along with her medical oncologist.  The patient comes here with what appears to be a very small soft tissue enlargement around the celiac trunk as well as around the SMA.  The patient did undergo a PET scan which I personally reviewed as well as her CT scan which I personally reviewed over at [facility] which does show uptake in the area of the celiac trunk.  This would indicate most likely there is recurrent disease.  Patient did have an attempt at interventional radiology biopsy of this area and this came back negative but this is a very difficult area to biopsy.  There is also questionable peritoneal disease on the PET scan.  The patient is here to discuss neck steps.  She denies any fever or chills, nausea or vomiting or weight loss.  She has no change in appetite.  Her ECOG performance status is 0.  She is complaining of intermittent right upper quadrant pain.      Past Medical History:   Diagnosis Date   • Cancer (CMS-HCC) 2017    Pancreatic   • Dental disorder     Full upper/lower dentures.   • Pain 8/8/17    Left ankle and left shoulder.   • Psychiatric problem      Depression and anxiety.     Past Surgical History:   Procedure Laterality Date   • INCISION AND DRAINAGE GENERAL  8/27/2017    Procedure: INCISION AND DRAINAGE GENERAL - Abdominal wall;  Surgeon: Calin Black M.D.;  Location: SURGERY [facility];  Service: General   • PANCREATECTOMY  8/9/2017    Procedure: PANCREATECTOMY DISTAL , OMENTAL FLAP, OPEN CHOLECYSTECTOMY;  Surgeon: Gera Lancaster M.D.;  Location: SURGERY [facility];  Service:    • SPLENECTOMY  8/9/2017    Procedure: SPLENECTOMY;  Surgeon: Gera Lancaster M.D.;  Location: SURGERY [facility];  Service:    • NODE DISSECTION  8/9/2017    Procedure: NODE DISSECTION ;  Surgeon: Gera Lancaster M.D.;  Location: SURGERY [facility];  Service:    • ABDOMINAL AORTIC ANEURYSM  8/9/2017    Procedure: PORTAL VEIN RECONSTRUCTION;  Surgeon: Nicolás Vila M.D.;  Location: SURGERY [facility];  Service:      No Known Allergies  Outpatient Encounter Medications as of 11/17/2020   Medication Sig Dispense Refill   • propranolol (INDERAL) 20 MG Tab      • lorazepam (ATIVAN) 1 MG Tab Take 1 mg by mouth 2 Times a Day.     • oxycodone immediate-release (ROXICODONE) 5 MG Tab Take 1 Tab by mouth every 3 hours as needed (Severe Pain (NRS Pain Scale 7-10; CPOT Pain Scale 6-8)). (Patient not taking: Reported on 11/17/2020) 40 Tab 0   • ketorolac (TORADOL) 10 MG Tab Take 1 Tab by mouth every 6 hours as needed. (Patient not taking: Reported on 11/17/2020) 30 Tab 1   • oxycodone immediate release (ROXICODONE) 10 MG immediate release tablet Take 1 Tab by mouth every 3 hours as needed for Severe Pain. (Patient not taking: Reported on 11/17/2020) 40 Tab 0   • ketorolac (TORADOL) 10 MG Tab Take 1 Tab by mouth every 6 hours as needed for Mild Pain. (Patient not taking: Reported on 11/17/2020) 30 Tab 1   • fluoxetine (PROZAC) 40 MG capsule Take 40 mg by mouth every day.       No facility-administered encounter medications on file  as of 2020.      Social History     Socioeconomic History   • Marital status: Single     Spouse name: Not on file   • Number of children: Not on file   • Years of education: Not on file   • Highest education level: Not on file   Occupational History   • Not on file   Social Needs   • Financial resource strain: Not on file   • Food insecurity     Worry: Not on file     Inability: Not on file   • Transportation needs     Medical: Not on file     Non-medical: Not on file   Tobacco Use   • Smoking status: Former Smoker     Packs/day: 2.00     Years: 15.00     Pack years: 30.00     Types: Cigarettes     Quit date: 2008     Years since quittin.8   • Smokeless tobacco: Never Used   Substance and Sexual Activity   • Alcohol use: No   • Drug use: Yes     Comment: Marijuana for pain - daily   • Sexual activity: Not on file   Lifestyle   • Physical activity     Days per week: Not on file     Minutes per session: Not on file   • Stress: Not on file   Relationships   • Social connections     Talks on phone: Not on file     Gets together: Not on file     Attends Hoahaoism service: Not on file     Active member of club or organization: Not on file     Attends meetings of clubs or organizations: Not on file     Relationship status: Not on file   • Intimate partner violence     Fear of current or ex partner: Not on file     Emotionally abused: Not on file     Physically abused: Not on file     Forced sexual activity: Not on file   Other Topics Concern   • Not on file   Social History Narrative   • Not on file      Social History     Tobacco Use   Smoking Status Former Smoker   • Packs/day: 2.00   • Years: 15.00   • Pack years: 30.00   • Types: Cigarettes   • Quit date: 2008   • Years since quittin.8   Smokeless Tobacco Never Used     Social History     Substance and Sexual Activity   Alcohol Use No     Social History     Substance and Sexual Activity   Drug Use Yes    Comment: Marijuana for pain - daily     "    No family history on file.  No pertinent family history on file    Review of Systems   HENT: Positive for hearing loss and sinus pain.    Eyes: Positive for blurred vision and double vision.   Gastrointestinal: Positive for abdominal pain.   Endo/Heme/Allergies: Positive for environmental allergies.   Psychiatric/Behavioral: Positive for depression.   All other systems reviewed and are negative.       Objective:   /76 (BP Location: Left arm, Patient Position: Sitting, BP Cuff Size: Adult)   Pulse 83   Temp 36.1 °C (96.9 °F) (Temporal)   Ht 1.651 m (5' 5\")   Wt 59 kg (130 lb)   SpO2 96%   BMI 21.63 kg/m²     Physical Exam   Constitutional: She is oriented to person, place, and time. She appears well-developed and well-nourished.   HENT:   Head: Normocephalic and atraumatic.   Eyes: Pupils are equal, round, and reactive to light. Conjunctivae are normal.   Neck: Normal range of motion. Neck supple.   Cardiovascular: Normal rate and regular rhythm.   Pulmonary/Chest: Effort normal and breath sounds normal.   Abdominal: Soft. Bowel sounds are normal. There is abdominal tenderness.   Right upper quadrant discomfort at times.   Musculoskeletal: Normal range of motion.   Neurological: She is alert and oriented to person, place, and time.   Skin: Skin is warm and dry.   Psychiatric: She has a normal mood and affect. Her behavior is normal. Judgment and thought content normal.   Nursing note and vitals reviewed.      Labs: 10/6/20        Imagin20 PET [facility]   Per my read,           20 CT [facility]           Pathology: 10/14/20 [facility]      Procedures: 10/14/20 [facility]       Diagnosis:     1. Malignant neoplasm of pancreas, unspecified location of malignancy (HCC)     2. Abdominal pain, unspecified abdominal location     3. Abnormal CT of the abdomen             Medical Decision Making:  Today's Assessment / Status / Plan:     In light of present findings, the patient has high suspicious areas around her celiac " trunk and her SMA.  This would represent recurrent disease since the PET scan is positive but we need to confirm with the tissue.  My recommendation is to get a repeat endoscopic ultrasound with Dr. Harley to confirm tissue diagnosis and then restart chemotherapy and possibly CyberKnife that focal lesion.  The patient did have radiation already but may be a candidate for CyberKnife.  She will see me after she has the EUS.    I, Dr. Lancaster have entered, reviewed and confirmed the above diagnoses related to this patient on this date of service, 11/17/2020  9:02 AM.    She agreed and verbalized her agreement and understanding with the current plan. I answered all questions and concerns she has at this time and advised her to call at any time in the interim with questions or concerns in regards to her care.    Thank you for allowing me to participate in her care, I will continue to follow closely.       Please note that this dictation was created using voice recognition software. I have made every reasonable attempt to correct obvious errors, but I expect that there are errors of grammar and possibly content that I did not discover before finalizing the note.     Thank you for this consultation and allowing me to participate in your patient's care. If I can be of further service please contact my office.

## 2020-11-17 ENCOUNTER — OFFICE VISIT (OUTPATIENT)
Dept: SURGICAL ONCOLOGY | Facility: MEDICAL CENTER | Age: 60
End: 2020-11-17
Payer: MEDICARE

## 2020-11-17 VITALS
WEIGHT: 130 LBS | DIASTOLIC BLOOD PRESSURE: 76 MMHG | BODY MASS INDEX: 21.66 KG/M2 | HEIGHT: 65 IN | SYSTOLIC BLOOD PRESSURE: 124 MMHG | HEART RATE: 83 BPM | OXYGEN SATURATION: 96 % | TEMPERATURE: 96.9 F

## 2020-11-17 DIAGNOSIS — R93.5 ABNORMAL CT OF THE ABDOMEN: ICD-10-CM

## 2020-11-17 DIAGNOSIS — R10.9 ABDOMINAL PAIN, UNSPECIFIED ABDOMINAL LOCATION: ICD-10-CM

## 2020-11-17 DIAGNOSIS — C25.9 METASTASIS FROM PANCREATIC CANCER (HCC): ICD-10-CM

## 2020-11-17 DIAGNOSIS — Z85.07 PERSONAL HISTORY OF PANCREATIC CANCER: ICD-10-CM

## 2020-11-17 DIAGNOSIS — C25.9 MALIGNANT NEOPLASM OF PANCREAS, UNSPECIFIED LOCATION OF MALIGNANCY (HCC): ICD-10-CM

## 2020-11-17 DIAGNOSIS — C79.9 METASTASIS FROM PANCREATIC CANCER (HCC): ICD-10-CM

## 2020-11-17 PROCEDURE — 99215 OFFICE O/P EST HI 40 MIN: CPT | Performed by: SURGERY

## 2020-11-17 RX ORDER — PROPRANOLOL HYDROCHLORIDE 20 MG/1
TABLET ORAL
COMMUNITY
Start: 2020-11-03

## 2020-11-17 ASSESSMENT — ENCOUNTER SYMPTOMS
BLURRED VISION: 1
ABDOMINAL PAIN: 1
DOUBLE VISION: 1
DEPRESSION: 1
SINUS PAIN: 1

## 2020-11-17 NOTE — PATIENT INSTRUCTIONS
The patient will be referred to her gastroenterologist for repeat EUS to get biopsies of the celiac trunk tumor.  She will see me after it has been completed

## 2020-11-25 ENCOUNTER — HOSPITAL ENCOUNTER (OUTPATIENT)
Facility: MEDICAL CENTER | Age: 60
End: 2020-11-25
Attending: PHYSICIAN ASSISTANT
Payer: MEDICARE

## 2020-11-25 ENCOUNTER — OFFICE VISIT (OUTPATIENT)
Dept: URGENT CARE | Facility: CLINIC | Age: 60
End: 2020-11-25
Payer: MEDICARE

## 2020-11-25 VITALS
RESPIRATION RATE: 16 BRPM | TEMPERATURE: 98.4 F | OXYGEN SATURATION: 96 % | WEIGHT: 125 LBS | BODY MASS INDEX: 20.83 KG/M2 | SYSTOLIC BLOOD PRESSURE: 102 MMHG | HEIGHT: 65 IN | HEART RATE: 87 BPM | DIASTOLIC BLOOD PRESSURE: 82 MMHG

## 2020-11-25 DIAGNOSIS — J06.9 UPPER RESPIRATORY TRACT INFECTION, UNSPECIFIED TYPE: ICD-10-CM

## 2020-11-25 DIAGNOSIS — Z20.822 CLOSE EXPOSURE TO COVID-19 VIRUS: ICD-10-CM

## 2020-11-25 PROCEDURE — 99203 OFFICE O/P NEW LOW 30 MIN: CPT | Mod: CS | Performed by: PHYSICIAN ASSISTANT

## 2020-11-25 PROCEDURE — U0003 INFECTIOUS AGENT DETECTION BY NUCLEIC ACID (DNA OR RNA); SEVERE ACUTE RESPIRATORY SYNDROME CORONAVIRUS 2 (SARS-COV-2) (CORONAVIRUS DISEASE [COVID-19]), AMPLIFIED PROBE TECHNIQUE, MAKING USE OF HIGH THROUGHPUT TECHNOLOGIES AS DESCRIBED BY CMS-2020-01-R: HCPCS

## 2020-11-26 ASSESSMENT — ENCOUNTER SYMPTOMS
FEVER: 0
SWEATS: 0
HEMOPTYSIS: 0
VOMITING: 0
NAUSEA: 0
SORE THROAT: 1
COUGH: 1
ABDOMINAL PAIN: 0
RHINORRHEA: 1
SHORTNESS OF BREATH: 0
WHEEZING: 0
DIARRHEA: 0
CHILLS: 1
HEADACHES: 1
MYALGIAS: 1
PALPITATIONS: 0

## 2020-11-26 NOTE — PROGRESS NOTES
Subjective:      Frieda Huizar is a 60 y.o. female who presents with Coronavirus Screening (cough with mucus, chest pain upon coughing, chills x3 days, exposed to positive case)            Cough  This is a new problem. Episode onset: 3 days. The problem has been unchanged. The problem occurs constantly. The cough is non-productive. Associated symptoms include chest pain (burning in chest with coughing, No pain at rest), chills, headaches, myalgias, nasal congestion, rhinorrhea and a sore throat. Pertinent negatives include no ear pain, fever, hemoptysis, shortness of breath, sweats or wheezing. Nothing aggravates the symptoms. She has tried nothing for the symptoms. Her past medical history is significant for pneumonia. There is no history of asthma or bronchitis.       The patient was exposed to COVID-19 one week ago.    Past Medical History:   Diagnosis Date   • Cancer (HCC) 2017    Pancreatic   • Dental disorder     Full upper/lower dentures.   • Pain 8/8/17    Left ankle and left shoulder.   • Psychiatric problem     Depression and anxiety.       Past Surgical History:   Procedure Laterality Date   • INCISION AND DRAINAGE GENERAL  8/27/2017    Procedure: INCISION AND DRAINAGE GENERAL - Abdominal wall;  Surgeon: Calin Black M.D.;  Location: [facility];  Service: General   • PANCREATECTOMY  8/9/2017    Procedure: PANCREATECTOMY DISTAL , OMENTAL FLAP, OPEN CHOLECYSTECTOMY;  Surgeon: Gera Lancaster M.D.;  Location: [facility];  Service:    • SPLENECTOMY  8/9/2017    Procedure: SPLENECTOMY;  Surgeon: Gera Lancaster M.D.;  Location: [facility];  Service:    • NODE DISSECTION  8/9/2017    Procedure: NODE DISSECTION ;  Surgeon: Gera Lancaster M.D.;  Location: [facility];  Service:    • ABDOMINAL AORTIC ANEURYSM  8/9/2017    Procedure: PORTAL VEIN RECONSTRUCTION;  Surgeon: Nicolás Vila M.D.;  Location: [facility];  " Service:        No family history on file.    No Known Allergies    Medications, Allergies, and current problem list reviewed today in Epic      Review of Systems   Constitutional: Positive for chills and malaise/fatigue. Negative for fever.   HENT: Positive for congestion, rhinorrhea and sore throat. Negative for ear discharge and ear pain.    Respiratory: Positive for cough. Negative for hemoptysis, shortness of breath and wheezing.    Cardiovascular: Positive for chest pain (burning in chest with coughing, No pain at rest). Negative for palpitations and leg swelling.   Gastrointestinal: Negative for abdominal pain, diarrhea, nausea and vomiting.   Musculoskeletal: Positive for myalgias.   Neurological: Positive for headaches.     All other systems reviewed and are negative.        Objective:     /82 (BP Location: Left arm, Patient Position: Sitting, BP Cuff Size: Adult)   Pulse 87   Temp 36.9 °C (98.4 °F) (Temporal)   Resp 16   Ht 1.651 m (5' 5\")   Wt 56.7 kg (125 lb)   SpO2 96%   BMI 20.80 kg/m²      Physical Exam  Constitutional:       General: She is not in acute distress.     Appearance: She is not ill-appearing or diaphoretic.   HENT:      Head: Normocephalic and atraumatic.      Nose: Nose normal.      Mouth/Throat:      Mouth: Mucous membranes are moist.      Pharynx: Posterior oropharyngeal erythema present.   Eyes:      Conjunctiva/sclera: Conjunctivae normal.   Cardiovascular:      Rate and Rhythm: Normal rate and regular rhythm.      Heart sounds: Normal heart sounds.   Pulmonary:      Effort: Pulmonary effort is normal. No respiratory distress.      Breath sounds: Normal breath sounds. No wheezing, rhonchi or rales.   Musculoskeletal: Normal range of motion.   Lymphadenopathy:      Cervical: No cervical adenopathy.   Skin:     General: Skin is warm and dry.   Neurological:      General: No focal deficit present.      Mental Status: She is alert and oriented to person, place, and time. "   Psychiatric:         Mood and Affect: Mood normal.         Behavior: Behavior normal.         Thought Content: Thought content normal.         Judgment: Judgment normal.                 Assessment/Plan:        1. Close exposure to COVID-19 virus    2. Upper respiratory tract infection, unspecified type    - COVID/SARS COV-2 PCR; Future    PAtient stable.   COVID test ordered.   COVID handout given.    Isolation guidelines, conservative measures and ER precautions discussed.     Differential diagnoses, Supportive care, and indications for immediate follow-up discussed with patient.   Pathogenesis of diagnosis discussed including typical length and natural progression.   Instructed to return to clinic or nearest emergency department for any change in condition, further concerns, or worsening of symptoms.    The patient demonstrated a good understanding and agreed with the treatment plan.    Beatrice Murdock P.A.-C.

## 2020-11-27 DIAGNOSIS — Z20.822 CLOSE EXPOSURE TO COVID-19 VIRUS: ICD-10-CM

## 2020-11-27 DIAGNOSIS — J06.9 UPPER RESPIRATORY TRACT INFECTION, UNSPECIFIED TYPE: ICD-10-CM

## 2020-11-27 LAB
COVID ORDER STATUS COVID19: NORMAL
SARS-COV-2 RNA RESP QL NAA+PROBE: DETECTED
SPECIMEN SOURCE: ABNORMAL

## 2021-01-20 ENCOUNTER — HOSPITAL ENCOUNTER (OUTPATIENT)
Dept: HOSPITAL 8 - STAR | Age: 61
Discharge: HOME | End: 2021-01-20
Attending: INTERNAL MEDICINE
Payer: MEDICARE

## 2021-01-20 DIAGNOSIS — Z01.818: Primary | ICD-10-CM

## 2021-01-20 DIAGNOSIS — C25.1: ICD-10-CM

## 2021-01-20 DIAGNOSIS — Z20.822: ICD-10-CM

## 2021-01-20 PROCEDURE — 87635 SARS-COV-2 COVID-19 AMP PRB: CPT

## 2021-01-20 PROCEDURE — 93005 ELECTROCARDIOGRAM TRACING: CPT

## 2021-01-26 ENCOUNTER — HOSPITAL ENCOUNTER (OUTPATIENT)
Dept: HOSPITAL 8 - OUT | Age: 61
Discharge: HOME | End: 2021-01-26
Attending: INTERNAL MEDICINE
Payer: MEDICARE

## 2021-01-26 VITALS — WEIGHT: 116.62 LBS | BODY MASS INDEX: 19.43 KG/M2 | HEIGHT: 65 IN

## 2021-01-26 VITALS — DIASTOLIC BLOOD PRESSURE: 85 MMHG | SYSTOLIC BLOOD PRESSURE: 136 MMHG

## 2021-01-26 DIAGNOSIS — F32.9: ICD-10-CM

## 2021-01-26 DIAGNOSIS — Z72.89: ICD-10-CM

## 2021-01-26 DIAGNOSIS — Z80.1: ICD-10-CM

## 2021-01-26 DIAGNOSIS — F17.210: ICD-10-CM

## 2021-01-26 DIAGNOSIS — M19.90: ICD-10-CM

## 2021-01-26 DIAGNOSIS — Z79.899: ICD-10-CM

## 2021-01-26 DIAGNOSIS — Z98.890: ICD-10-CM

## 2021-01-26 DIAGNOSIS — K86.89: Primary | ICD-10-CM

## 2021-01-26 DIAGNOSIS — F12.90: ICD-10-CM

## 2021-01-26 DIAGNOSIS — Z85.07: ICD-10-CM

## 2021-01-26 DIAGNOSIS — F17.220: ICD-10-CM

## 2021-01-26 DIAGNOSIS — Z80.0: ICD-10-CM

## 2021-01-26 PROCEDURE — 88173 CYTOPATH EVAL FNA REPORT: CPT

## 2021-01-26 PROCEDURE — 88172 CYTP DX EVAL FNA 1ST EA SITE: CPT

## 2021-01-26 PROCEDURE — 43242 EGD US FINE NEEDLE BX/ASPIR: CPT

## 2021-01-26 PROCEDURE — 88307 TISSUE EXAM BY PATHOLOGIST: CPT

## 2021-01-26 RX ADMIN — FENTANYL CITRATE PRN MCG: 50 INJECTION INTRAMUSCULAR; INTRAVENOUS at 14:37

## 2021-01-26 RX ADMIN — FENTANYL CITRATE PRN MCG: 50 INJECTION INTRAMUSCULAR; INTRAVENOUS at 14:23

## 2021-03-15 DIAGNOSIS — Z23 NEED FOR VACCINATION: ICD-10-CM

## 2021-04-15 ENCOUNTER — IMMUNIZATION (OUTPATIENT)
Dept: FAMILY PLANNING/WOMEN'S HEALTH CLINIC | Facility: IMMUNIZATION CENTER | Age: 61
End: 2021-04-15
Payer: MEDICARE

## 2021-04-15 DIAGNOSIS — Z23 ENCOUNTER FOR VACCINATION: Primary | ICD-10-CM

## 2021-04-15 PROCEDURE — 0001A PFIZER SARS-COV-2 VACCINE: CPT | Performed by: INTERNAL MEDICINE

## 2021-04-15 PROCEDURE — 91300 PFIZER SARS-COV-2 VACCINE: CPT | Performed by: INTERNAL MEDICINE

## 2021-04-28 ENCOUNTER — HOSPITAL ENCOUNTER (OUTPATIENT)
Dept: HOSPITAL 8 - CFH | Age: 61
Discharge: HOME | End: 2021-04-28
Attending: RADIOLOGY
Payer: MEDICARE

## 2021-04-28 DIAGNOSIS — K43.9: ICD-10-CM

## 2021-04-28 DIAGNOSIS — C25.1: Primary | ICD-10-CM

## 2021-04-28 PROCEDURE — 74177 CT ABD & PELVIS W/CONTRAST: CPT

## 2021-04-28 PROCEDURE — 71260 CT THORAX DX C+: CPT

## 2021-05-03 ENCOUNTER — HOSPITAL ENCOUNTER (OUTPATIENT)
Dept: HOSPITAL 8 - ROC | Age: 61
Discharge: HOME | End: 2021-05-03
Attending: STUDENT IN AN ORGANIZED HEALTH CARE EDUCATION/TRAINING PROGRAM
Payer: MEDICARE

## 2021-05-03 DIAGNOSIS — Z08: Primary | ICD-10-CM

## 2021-05-03 DIAGNOSIS — Z85.07: ICD-10-CM

## 2021-05-03 PROCEDURE — G0463 HOSPITAL OUTPT CLINIC VISIT: HCPCS

## 2021-05-03 PROCEDURE — 99212 OFFICE O/P EST SF 10 MIN: CPT

## 2021-05-07 ENCOUNTER — IMMUNIZATION (OUTPATIENT)
Dept: FAMILY PLANNING/WOMEN'S HEALTH CLINIC | Facility: IMMUNIZATION CENTER | Age: 61
End: 2021-05-07
Attending: INTERNAL MEDICINE
Payer: MEDICARE

## 2021-05-07 DIAGNOSIS — Z23 ENCOUNTER FOR VACCINATION: Primary | ICD-10-CM

## 2021-05-07 PROCEDURE — 0002A PFIZER SARS-COV-2 VACCINE: CPT

## 2021-05-07 PROCEDURE — 91300 PFIZER SARS-COV-2 VACCINE: CPT

## 2021-05-18 ENCOUNTER — HOSPITAL ENCOUNTER (OUTPATIENT)
Dept: HOSPITAL 8 - OUT | Age: 61
Discharge: HOME | End: 2021-05-18
Attending: INTERNAL MEDICINE
Payer: MEDICARE

## 2021-05-18 VITALS — SYSTOLIC BLOOD PRESSURE: 126 MMHG | DIASTOLIC BLOOD PRESSURE: 83 MMHG

## 2021-05-18 VITALS — BODY MASS INDEX: 17.63 KG/M2 | HEIGHT: 65 IN | WEIGHT: 105.82 LBS

## 2021-05-18 DIAGNOSIS — Z87.891: ICD-10-CM

## 2021-05-18 DIAGNOSIS — F41.9: ICD-10-CM

## 2021-05-18 DIAGNOSIS — C25.1: Primary | ICD-10-CM

## 2021-05-18 DIAGNOSIS — Z79.899: ICD-10-CM

## 2021-05-18 DIAGNOSIS — Z84.1: ICD-10-CM

## 2021-05-18 DIAGNOSIS — Z80.1: ICD-10-CM

## 2021-05-18 DIAGNOSIS — C78.6: ICD-10-CM

## 2021-05-18 DIAGNOSIS — F12.90: ICD-10-CM

## 2021-05-18 DIAGNOSIS — M19.90: ICD-10-CM

## 2021-05-18 DIAGNOSIS — F32.9: ICD-10-CM

## 2021-05-18 DIAGNOSIS — Z80.0: ICD-10-CM

## 2021-05-18 LAB
INR PPP: 1.01 (ref 0.93–1.1)
PROTHROMBIN TIME: 10.8 SECONDS (ref 9.6–11.5)

## 2021-05-18 PROCEDURE — 88305 TISSUE EXAM BY PATHOLOGIST: CPT

## 2021-05-18 PROCEDURE — 88333 PATH CONSLTJ SURG CYTO XM 1: CPT

## 2021-05-18 PROCEDURE — 77012 CT SCAN FOR NEEDLE BIOPSY: CPT

## 2021-05-18 PROCEDURE — 99157 MOD SED OTHER PHYS/QHP EA: CPT

## 2021-05-18 PROCEDURE — 85610 PROTHROMBIN TIME: CPT

## 2021-05-18 PROCEDURE — 36415 COLL VENOUS BLD VENIPUNCTURE: CPT

## 2021-05-18 PROCEDURE — 49180 BIOPSY ABDOMINAL MASS: CPT

## 2021-05-18 PROCEDURE — 99156 MOD SED OTH PHYS/QHP 5/>YRS: CPT

## 2021-08-02 ENCOUNTER — HOSPITAL ENCOUNTER (OUTPATIENT)
Dept: HOSPITAL 8 - RAD | Age: 61
Discharge: HOME | End: 2021-08-02
Attending: INTERNAL MEDICINE
Payer: MEDICARE

## 2021-08-02 DIAGNOSIS — C25.1: Primary | ICD-10-CM

## 2021-08-02 DIAGNOSIS — R22.43: ICD-10-CM

## 2021-08-02 PROCEDURE — 93970 EXTREMITY STUDY: CPT

## 2021-09-08 ENCOUNTER — OFFICE VISIT (OUTPATIENT)
Dept: URGENT CARE | Facility: CLINIC | Age: 61
End: 2021-09-08
Payer: MEDICARE

## 2021-09-08 ENCOUNTER — HOSPITAL ENCOUNTER (OUTPATIENT)
Facility: MEDICAL CENTER | Age: 61
End: 2021-09-08
Attending: PHYSICIAN ASSISTANT
Payer: MEDICARE

## 2021-09-08 VITALS
DIASTOLIC BLOOD PRESSURE: 62 MMHG | HEIGHT: 63 IN | WEIGHT: 96 LBS | BODY MASS INDEX: 17.01 KG/M2 | SYSTOLIC BLOOD PRESSURE: 112 MMHG | TEMPERATURE: 99.2 F | RESPIRATION RATE: 14 BRPM | HEART RATE: 66 BPM | OXYGEN SATURATION: 95 %

## 2021-09-08 DIAGNOSIS — J06.9 UPPER RESPIRATORY TRACT INFECTION, UNSPECIFIED TYPE: ICD-10-CM

## 2021-09-08 PROCEDURE — 99213 OFFICE O/P EST LOW 20 MIN: CPT | Performed by: PHYSICIAN ASSISTANT

## 2021-09-08 PROCEDURE — U0003 INFECTIOUS AGENT DETECTION BY NUCLEIC ACID (DNA OR RNA); SEVERE ACUTE RESPIRATORY SYNDROME CORONAVIRUS 2 (SARS-COV-2) (CORONAVIRUS DISEASE [COVID-19]), AMPLIFIED PROBE TECHNIQUE, MAKING USE OF HIGH THROUGHPUT TECHNOLOGIES AS DESCRIBED BY CMS-2020-01-R: HCPCS

## 2021-09-08 PROCEDURE — U0005 INFEC AGEN DETEC AMPLI PROBE: HCPCS

## 2021-09-08 ASSESSMENT — ENCOUNTER SYMPTOMS
CHILLS: 0
MYALGIAS: 1
COUGH: 1
VOMITING: 0
SHORTNESS OF BREATH: 1
RHINORRHEA: 1
NAUSEA: 1
ABDOMINAL PAIN: 0
FEVER: 1
WHEEZING: 0
HEMOPTYSIS: 0
DIARRHEA: 0
SORE THROAT: 1
PALPITATIONS: 0
SWEATS: 0
SPUTUM PRODUCTION: 1
HEADACHES: 1
SENSORY CHANGE: 1

## 2021-09-09 DIAGNOSIS — J06.9 UPPER RESPIRATORY TRACT INFECTION, UNSPECIFIED TYPE: ICD-10-CM

## 2021-09-09 LAB — COVID ORDER STATUS COVID19: NORMAL

## 2021-09-09 NOTE — PROGRESS NOTES
Subjective     Frieda Huizar is a 61 y.o. female who presents with Nasal Congestion (congestin, cough, body aches, loss of taste & smell, nausea, headache)            Cough  This is a new problem. Episode onset: 2 days. The problem has been unchanged. The cough is productive of sputum (white). Associated symptoms include a fever (TMAX 100.2 F), headaches, myalgias, nasal congestion, rhinorrhea, a sore throat and shortness of breath. Pertinent negatives include no chest pain, chills (loss of taste and smell), ear congestion, ear pain, hemoptysis, rash, sweats or wheezing. Nothing aggravates the symptoms. She has tried nothing for the symptoms.     The patient states her friend who is sick recently found out her brother tested positive for COVID. She was exposed to the friend.  The patient is vaccinated. She had Covid about 10 months ago.    Past Medical History:   Diagnosis Date   • Cancer (HCC) 2017    Pancreatic   • Dental disorder     Full upper/lower dentures.   • Pain 8/8/17    Left ankle and left shoulder.   • Psychiatric problem     Depression and anxiety.     Past Surgical History:   Procedure Laterality Date   • INCISION AND DRAINAGE GENERAL  8/27/2017    Procedure: INCISION AND DRAINAGE GENERAL - Abdominal wall;  Surgeon: Calin Black M.D.;  Location: [facility];  Service: General   • PANCREATECTOMY  8/9/2017    Procedure: PANCREATECTOMY DISTAL , OMENTAL FLAP, OPEN CHOLECYSTECTOMY;  Surgeon: Gera Lancaster M.D.;  Location: [facility];  Service:    • SPLENECTOMY  8/9/2017    Procedure: SPLENECTOMY;  Surgeon: Gera Lancaster M.D.;  Location: SURGERY [facility];  Service:    • NODE DISSECTION  8/9/2017    Procedure: NODE DISSECTION ;  Surgeon: Gera Lancaster M.D.;  Location: SURGERY [facility];  Service:    • ABDOMINAL AORTIC ANEURYSM  8/9/2017    Procedure: PORTAL VEIN RECONSTRUCTION;  Surgeon: Nicolás Vila M.D.;  [facility];  Service:        No family history on file.    No Known Allergies    Medications, Allergies, and current problem list reviewed today in Epic    Review of Systems   Constitutional: Positive for fever (TMAX 100.2 F) and malaise/fatigue. Negative for chills (loss of taste and smell).   HENT: Positive for congestion, rhinorrhea and sore throat. Negative for ear discharge and ear pain.    Respiratory: Positive for cough, sputum production and shortness of breath. Negative for hemoptysis and wheezing.    Cardiovascular: Negative for chest pain, palpitations and leg swelling.   Gastrointestinal: Positive for nausea. Negative for abdominal pain, diarrhea and vomiting.   Musculoskeletal: Positive for myalgias.   Skin: Negative for rash.   Neurological: Positive for sensory change (loss of taste and smell) and headaches.     All other systems reviewed and are negative.              Objective     /62   Pulse 66   Temp 37.3 °C (99.2 °F)   Resp 14   Ht 1.6 m (5' 3\")   Wt 43.5 kg (96 lb)   SpO2 95%   BMI 17.01 kg/m²      Physical Exam  Constitutional:       General: She is not in acute distress.     Appearance: She is not ill-appearing.   HENT:      Head: Normocephalic and atraumatic.      Nose: Congestion and rhinorrhea present.      Mouth/Throat:      Mouth: Mucous membranes are moist.      Pharynx: No posterior oropharyngeal erythema.   Cardiovascular:      Rate and Rhythm: Normal rate and regular rhythm.      Heart sounds: Normal heart sounds.   Pulmonary:      Effort: Pulmonary effort is normal. No respiratory distress.      Breath sounds: Normal breath sounds. No wheezing, rhonchi or rales.   Lymphadenopathy:      Cervical: No cervical adenopathy.   Skin:     General: Skin is warm and dry.      Findings: No rash.   Neurological:      General: No focal deficit present.      Mental Status: She is alert and oriented to person, place, and time.   Psychiatric:         Mood and Affect: Mood normal.   "       Behavior: Behavior normal.         Thought Content: Thought content normal.         Judgment: Judgment normal.                             Assessment & Plan        1. Upper respiratory tract infection, unspecified type    - SARS-CoV-2 PCR (24 hour In-House): Collect NP swab in VTM; Future  COVID test pending.  Isolation guidelines, conservative measures and ER precautions discussed.   COVID handout given.    Differential diagnoses, Supportive care, and indications for immediate follow-up discussed with patient.   Pathogenesis of diagnosis discussed including typical length and natural progression.   Instructed to return to clinic or nearest emergency department for any change in condition, further concerns, or worsening of symptoms.    The patient demonstrated a good understanding and agreed with the treatment plan.    Beatrice Murdock P.A.-C.

## 2021-09-10 LAB
SARS-COV-2 RNA RESP QL NAA+PROBE: NOTDETECTED
SPECIMEN SOURCE: NORMAL

## 2021-11-03 ENCOUNTER — HOSPITAL ENCOUNTER (OUTPATIENT)
Dept: RADIOLOGY | Facility: MEDICAL CENTER | Age: 61
End: 2021-11-03
Attending: INTERNAL MEDICINE
Payer: MEDICARE

## 2022-07-13 ENCOUNTER — TELEPHONE (OUTPATIENT)
Dept: SURGICAL ONCOLOGY | Facility: MEDICAL CENTER | Age: 62
End: 2022-07-13
Payer: MEDICARE

## 2022-07-13 NOTE — TELEPHONE ENCOUNTER
Patient called today wanting to make an appointment with . I told the patient she needs to be worked up by GI first since in 2020  wanted her to have a EUS done. She did said that she never had that done. She will contact GI to follow up with them and then call back 's office for a follow up.

## 2022-11-07 ENCOUNTER — PATIENT MESSAGE (OUTPATIENT)
Dept: HEALTH INFORMATION MANAGEMENT | Facility: OTHER | Age: 62
End: 2022-11-07

## (undated) DEVICE — TUBING CLEARLINK DUO-VENT - C-FLO (48EA/CA)

## (undated) DEVICE — TRAY SURESTEP FOLEY TEMP SENSING 16FR (10EA/CA) ORDER  #18764 FOR TEMP FOLEY ONLY

## (undated) DEVICE — CLIP SM INTNL HRZN TI ESCP LGT - (24EA/PK 25PK/BX)

## (undated) DEVICE — Device

## (undated) DEVICE — PROTECTOR ULNA NERVE - (36PR/CA)

## (undated) DEVICE — DRAPE IOBAN II INCISE 23X17 - (10EA/BX 4BX/CA)

## (undated) DEVICE — SUCTION INSTRUMENT YANKAUER BULBOUS TIP W/O VENT (50EA/CA)

## (undated) DEVICE — FILTER BLOOD TRANSFUSION - (40/CA) (PALL)

## (undated) DEVICE — SET EXTENSION WITH 2 PORTS (48EA/CA) ***PART #2C8610 IS A SUBSTITUTE*****

## (undated) DEVICE — HEAD HOLDER JUNIOR/ADULT

## (undated) DEVICE — VESSELOOP MINI BLUE STERILE - SURG-I-LOOP (10EA/BX)

## (undated) DEVICE — GLOVE BIOGEL INDICATOR SZ 7.5 SURGICAL PF LTX - (50PR/BX 4BX/CA)

## (undated) DEVICE — ELECTRODE DUAL RETURN W/ CORD - (50/PK)

## (undated) DEVICE — GLOVE BIOGEL ECLIPSE PF LATEX SIZE 8.0  (50PR/BX)

## (undated) DEVICE — TUBE CONNECT SUCTION CLEAR 120 X 1/4" (50EA/CA)"

## (undated) DEVICE — SUTURE GENERAL

## (undated) DEVICE — NEPTUNE 4 PORT MANIFOLD - (20/PK)

## (undated) DEVICE — ELECTRODE 850 FOAM ADHESIVE - HYDROGEL RADIOTRNSPRNT (50/PK)

## (undated) DEVICE — TOWELS CLOTH SURGICAL - (4/PK 20PK/CA)

## (undated) DEVICE — SUTURE 2-0 SILK SH 30 IN C/R (12PK/BX)

## (undated) DEVICE — SODIUM CHL IRRIGATION 0.9% 1000ML (12EA/CA)

## (undated) DEVICE — TRAY ANESTHESIA - CONTINUOUS EPIDURAL (10EA/CA) THIS IS A CUSTOM PACK

## (undated) DEVICE — SPONGE XRAY 8X4 STERL. 12PL - (10EA/TY 80TY/CA)

## (undated) DEVICE — LACTATED RINGERS INJ 1000 ML - (14EA/CA 60CA/PF)

## (undated) DEVICE — GOWN WARMING STANDARD FLEX - (30/CA)

## (undated) DEVICE — CHLORAPREP 26 ML APPLICATOR - ORANGE TINT(25/CA)

## (undated) DEVICE — SUTURE CV

## (undated) DEVICE — SET LEADWIRE 5 LEAD BEDSIDE DISPOSABLE ECG (1SET OF 5/EA)

## (undated) DEVICE — KIT ROOM DECONTAMINATION

## (undated) DEVICE — DRAPE MEDI-SLUSH STERILE  - (40/CA)

## (undated) DEVICE — SUTURE 2-0 SILK 12 REEL (12PK/BX)"

## (undated) DEVICE — GLOVE BIOGEL SZ 7 SURGICAL PF LTX - (50PR/BX 4BX/CA)

## (undated) DEVICE — GLOVE BIOGEL PI ORTHO SZ 7.5 PF LF (40PR/BX)

## (undated) DEVICE — BLADE SURGICAL #11 - (50/BX)

## (undated) DEVICE — DRAPE LAPAROTOMY T SHEET - (12EA/CA)

## (undated) DEVICE — GLOVE BIOGEL INDICATOR SZ 8.5 SURGICAL PF LTX - (50/BX 4BX/CA)

## (undated) DEVICE — SUTURE 2-0 ETHILON FS - (36/BX) 18 INCH

## (undated) DEVICE — BAG, SPONGE COUNT 50600

## (undated) DEVICE — SLEEVE, VASO, THIGH, MED

## (undated) DEVICE — CANISTER SUCTION 3000ML MECHANICAL FILTER AUTO SHUTOFF MEDI-VAC NONSTERILE LF DISP  (40EA/CA)

## (undated) DEVICE — PACK MINOR BASIN - (2EA/CA)

## (undated) DEVICE — SUTURE 4-0 PROLENE RB-1 D/A 36 (36PK/BX)"

## (undated) DEVICE — DRAIN PENROSE STERILE 1/4 X - 18 IN  (25EA/BX)

## (undated) DEVICE — SUTURE 3-0 SILK 12 REEL (12PK/BX)"

## (undated) DEVICE — TUBE E-T HI-LO CUFF 7.0MM (10EA/PK)

## (undated) DEVICE — MASK ANESTHESIA ADULT  - (100/CA)

## (undated) DEVICE — WATER IRRIG. STER. 1500 ML - (9/CA)

## (undated) DEVICE — DRAIN J-VAC 10MM FLAT - (10/CA)

## (undated) DEVICE — PAD LAP STERILE 18 X 18 - (5/PK 40PK/CA)

## (undated) DEVICE — SENSOR SPO2 NEO LNCS ADHESIVE (20/BX) SEE USER NOTES

## (undated) DEVICE — TUBE NG SALEM SUMP 16FR (50EA/CA)

## (undated) DEVICE — BOVIE BLADE COATED - (50/PK)

## (undated) DEVICE — SYRINGE 10 ML CONTROL LL (25EA/BX 4BX/CA)

## (undated) DEVICE — STAPLER SKIN DISP - (6/BX 10BX/CA) VISISTAT

## (undated) DEVICE — SPONGE GAUZESTER 4 X 4 4PLY - (128PK/CA)

## (undated) DEVICE — SUTURE 3-0 PROLENE SH 36 (36PK/BX)"

## (undated) DEVICE — LIGASURE TISSUE FUSION  - SINGLE USE (6/CA)

## (undated) DEVICE — SPONGE LAP XR ST 36X36 LF - (1/PK40PK/CA)

## (undated) DEVICE — BAG RESUSCITATION DISPOSABLE - WITH MASK (10 EA/CA)

## (undated) DEVICE — GLOVE BIOGEL SZ 8 SURGICAL PF LTX - (50PR/BX 4BX/CA)

## (undated) DEVICE — GOWN SURGICAL X-LARGE ULTRA - FILM-REINFORCED (20/CA)

## (undated) DEVICE — BLOCK

## (undated) DEVICE — CLIP MED INTNL HRZN TI ESCP - (25/BX)

## (undated) DEVICE — KIT ANESTHESIA W/CIRCUIT & 3/LT BAG W/FILTER (20EA/CA)

## (undated) DEVICE — SUTURE 5-0 PROLENE C-1 D/A 24 (36PK/BX)"

## (undated) DEVICE — DRESSING LEUKOMED STERILE 11.75X4IN - (50/CA)

## (undated) DEVICE — SUTURE 4-0 PROLENE SH 36 (36PK/BX)"

## (undated) DEVICE — BLADE SURGICAL #15 - (50/BX 3BX/CA)

## (undated) DEVICE — SURGIFOAM (SIZE 100) - (6EA/CA)

## (undated) DEVICE — DRESSING TRANSPARENT FILM TEGADERM 2.375 X 2.75"  (100EA/BX)"

## (undated) DEVICE — BOVIE  BLADE 6 EXTENDED - (50/PK)

## (undated) DEVICE — GOWN SURGEONS X-LARGE - DISP. (30/CA)

## (undated) DEVICE — CLIP MED LG INTNL HRZN TI ESCP - (20/BX)

## (undated) DEVICE — STAPLER 45MM LINEAR ENDO FLEX - ECHELON (3EA/BX)

## (undated) DEVICE — SUTURE 1 PDS II PLUS TP-1 - (12PK/BX)

## (undated) DEVICE — KIT RADIAL ARTERY 20GA W/MAX BARRIER AND BIOPATCH  (5EA/CA) #10740 IS FOR THE SET RADIAL ARTERIAL

## (undated) DEVICE — CLIP LG INTNL HRZN TI ESCP LGT - (20/BX)

## (undated) DEVICE — RESERVOIR SUCTION 100 CC - SILICONE (20EA/CA)

## (undated) DEVICE — K-PAD 25X64 THERMIA BLANKET - (10/BX)

## (undated) DEVICE — TRAY SKIN SCRUB PVP WET (20EA/CA) PART #DYND70356 DISCONTINUED

## (undated) DEVICE — SOD. CHL. INJ. 0.9% 1000 ML - (14EA/CA 60CA/PF)

## (undated) DEVICE — SUTURE 3-0 SILK SH (12PK/BX)

## (undated) DEVICE — DRAPESURG STERI-DRAPE LONG - (10/BX 4BX/CA)

## (undated) DEVICE — SWAB ANAEROBIC SPEC.COLLECTOR - (25/PK 4PK/CA 100EA/CA)

## (undated) DEVICE — TRANSDUCER ADULT DISP. SINGLE BONDED STERILE - (20EA/CA)

## (undated) DEVICE — SUTURE 3-0 VICRYL PLUS SH - 8X 18 INCH (12/BX)

## (undated) DEVICE — STAPLE 45MM WHTE 2.5MM - (12/BX)

## (undated) DEVICE — TRAY MULTI-LUMEN 7FR PRESSURE W/MAX BARRIER AND BIOPATCH - (5/CA)

## (undated) DEVICE — GLOVE BIOGEL PI INDICATOR SZ 6.5 SURGICAL PF LF - (50/BX 4BX/CA)

## (undated) DEVICE — TUBE, CULTURE AEROBIC

## (undated) DEVICE — SPONGE PEANUT - (5/PK 50PK/CA)

## (undated) DEVICE — PACK MAJOR BASIN - (2EA/CA)

## (undated) DEVICE — DRAPE LARGE 3 QUARTER - (20/CA)

## (undated) DEVICE — VESSELOOP MAXI BLUE STERILE- SURG-I-LOOP (10EA/BX)